# Patient Record
Sex: FEMALE | Race: NATIVE HAWAIIAN OR OTHER PACIFIC ISLANDER | NOT HISPANIC OR LATINO
[De-identification: names, ages, dates, MRNs, and addresses within clinical notes are randomized per-mention and may not be internally consistent; named-entity substitution may affect disease eponyms.]

---

## 2017-10-02 ENCOUNTER — APPOINTMENT (OUTPATIENT)
Dept: UROGYNECOLOGY | Facility: CLINIC | Age: 82
End: 2017-10-02
Payer: MEDICARE

## 2017-10-02 VITALS
HEIGHT: 64 IN | DIASTOLIC BLOOD PRESSURE: 80 MMHG | WEIGHT: 165 LBS | SYSTOLIC BLOOD PRESSURE: 152 MMHG | BODY MASS INDEX: 28.17 KG/M2

## 2017-10-02 DIAGNOSIS — Z82.49 FAMILY HISTORY OF ISCHEMIC HEART DISEASE AND OTHER DISEASES OF THE CIRCULATORY SYSTEM: ICD-10-CM

## 2017-10-02 DIAGNOSIS — R39.15 URGENCY OF URINATION: ICD-10-CM

## 2017-10-02 DIAGNOSIS — Z83.3 FAMILY HISTORY OF DIABETES MELLITUS: ICD-10-CM

## 2017-10-02 DIAGNOSIS — Z82.3 FAMILY HISTORY OF STROKE: ICD-10-CM

## 2017-10-02 DIAGNOSIS — Z78.9 OTHER SPECIFIED HEALTH STATUS: ICD-10-CM

## 2017-10-02 DIAGNOSIS — Z83.79 FAMILY HISTORY OF OTHER DISEASES OF THE DIGESTIVE SYSTEM: ICD-10-CM

## 2017-10-02 DIAGNOSIS — Z63.4 DISAPPEARANCE AND DEATH OF FAMILY MEMBER: ICD-10-CM

## 2017-10-02 DIAGNOSIS — N39.41 URGE INCONTINENCE: ICD-10-CM

## 2017-10-02 PROCEDURE — 99204 OFFICE O/P NEW MOD 45 MIN: CPT | Mod: 25

## 2017-10-02 PROCEDURE — 51701 INSERT BLADDER CATHETER: CPT

## 2017-10-02 SDOH — SOCIAL STABILITY - SOCIAL INSECURITY: DISSAPEARANCE AND DEATH OF FAMILY MEMBER: Z63.4

## 2017-10-03 ENCOUNTER — RESULT REVIEW (OUTPATIENT)
Age: 82
End: 2017-10-03

## 2017-10-03 PROBLEM — Z82.49 FAMILY HISTORY OF HYPERTENSION: Status: ACTIVE | Noted: 2017-10-03

## 2017-10-03 PROBLEM — Z78.9 NON-SMOKER: Status: ACTIVE | Noted: 2017-10-03

## 2017-10-03 PROBLEM — Z63.4 WIDOWED: Status: ACTIVE | Noted: 2017-10-03

## 2017-10-03 PROBLEM — Z83.79 FAMILY HISTORY OF LIVER DISEASE: Status: ACTIVE | Noted: 2017-10-03

## 2017-10-03 PROBLEM — Z82.3 FAMILY HISTORY OF CEREBROVASCULAR ACCIDENT (CVA): Status: ACTIVE | Noted: 2017-10-03

## 2017-10-03 PROBLEM — Z83.3 FAMILY HISTORY OF DIABETES MELLITUS: Status: ACTIVE | Noted: 2017-10-03

## 2017-10-03 LAB
APPEARANCE: CLEAR
BACTERIA: NEGATIVE
BILIRUB UR QL STRIP: NORMAL
BILIRUBIN URINE: NEGATIVE
BLOOD URINE: NEGATIVE
CLARITY UR: CLEAR
COLLECTION METHOD: NORMAL
COLOR: YELLOW
GLUCOSE QUALITATIVE U: NORMAL MG/DL
GLUCOSE UR-MCNC: NORMAL
HCG UR QL: 0.2 EU/DL
HGB UR QL STRIP.AUTO: NORMAL
KETONES UR-MCNC: NORMAL
KETONES URINE: NEGATIVE
LEUKOCYTE ESTERASE UR QL STRIP: NORMAL
LEUKOCYTE ESTERASE URINE: NEGATIVE
MICROSCOPIC-UA: NORMAL
NITRITE UR QL STRIP: NORMAL
NITRITE URINE: NEGATIVE
PH UR STRIP: 6
PH URINE: 6
PROT UR STRIP-MCNC: NORMAL
PROTEIN URINE: NEGATIVE MG/DL
RED BLOOD CELLS URINE: 1 /HPF
SP GR UR STRIP: 1.01
SPECIFIC GRAVITY URINE: 1.02
SQUAMOUS EPITHELIAL CELLS: 1 /HPF
UROBILINOGEN URINE: NORMAL MG/DL
WHITE BLOOD CELLS URINE: 0 /HPF

## 2017-10-04 ENCOUNTER — RESULT REVIEW (OUTPATIENT)
Age: 82
End: 2017-10-04

## 2017-10-04 LAB — BACTERIA UR CULT: NORMAL

## 2017-10-12 ENCOUNTER — APPOINTMENT (OUTPATIENT)
Dept: UROGYNECOLOGY | Facility: CLINIC | Age: 82
End: 2017-10-12

## 2017-10-23 ENCOUNTER — APPOINTMENT (OUTPATIENT)
Dept: UROGYNECOLOGY | Facility: CLINIC | Age: 82
End: 2017-10-23

## 2019-12-06 ENCOUNTER — RX RENEWAL (OUTPATIENT)
Age: 84
End: 2019-12-06

## 2019-12-24 ENCOUNTER — RX RENEWAL (OUTPATIENT)
Age: 84
End: 2019-12-24

## 2019-12-25 ENCOUNTER — RX RENEWAL (OUTPATIENT)
Age: 84
End: 2019-12-25

## 2019-12-26 ENCOUNTER — MEDICATION RENEWAL (OUTPATIENT)
Age: 84
End: 2019-12-26

## 2020-01-18 ENCOUNTER — RX RENEWAL (OUTPATIENT)
Age: 85
End: 2020-01-18

## 2020-01-24 ENCOUNTER — RX RENEWAL (OUTPATIENT)
Age: 85
End: 2020-01-24

## 2020-02-12 ENCOUNTER — RX RENEWAL (OUTPATIENT)
Age: 85
End: 2020-02-12

## 2020-03-23 ENCOUNTER — RX RENEWAL (OUTPATIENT)
Age: 85
End: 2020-03-23

## 2020-06-02 ENCOUNTER — RX RENEWAL (OUTPATIENT)
Age: 85
End: 2020-06-02

## 2020-06-08 ENCOUNTER — RX RENEWAL (OUTPATIENT)
Age: 85
End: 2020-06-08

## 2020-07-06 ENCOUNTER — RX RENEWAL (OUTPATIENT)
Age: 85
End: 2020-07-06

## 2020-07-21 ENCOUNTER — RX RENEWAL (OUTPATIENT)
Age: 85
End: 2020-07-21

## 2020-09-08 ENCOUNTER — RX RENEWAL (OUTPATIENT)
Age: 85
End: 2020-09-08

## 2020-11-25 ENCOUNTER — RX RENEWAL (OUTPATIENT)
Age: 85
End: 2020-11-25

## 2020-12-14 ENCOUNTER — APPOINTMENT (OUTPATIENT)
Dept: INTERNAL MEDICINE | Facility: CLINIC | Age: 85
End: 2020-12-14
Payer: MEDICARE

## 2020-12-14 ENCOUNTER — NON-APPOINTMENT (OUTPATIENT)
Age: 85
End: 2020-12-14

## 2020-12-14 VITALS
TEMPERATURE: 98.1 F | HEIGHT: 64 IN | WEIGHT: 160 LBS | HEART RATE: 67 BPM | BODY MASS INDEX: 27.31 KG/M2 | OXYGEN SATURATION: 96 %

## 2020-12-14 VITALS — SYSTOLIC BLOOD PRESSURE: 140 MMHG | DIASTOLIC BLOOD PRESSURE: 80 MMHG

## 2020-12-14 PROCEDURE — 36415 COLL VENOUS BLD VENIPUNCTURE: CPT

## 2020-12-14 PROCEDURE — 99214 OFFICE O/P EST MOD 30 MIN: CPT | Mod: 25

## 2020-12-14 PROCEDURE — 93000 ELECTROCARDIOGRAM COMPLETE: CPT

## 2020-12-14 RX ORDER — ATENOLOL 50 MG/1
TABLET ORAL
Refills: 0 | Status: DISCONTINUED | COMMUNITY
End: 2020-12-14

## 2020-12-14 RX ORDER — OMEPRAZOLE 40 MG/1
CAPSULE, DELAYED RELEASE ORAL
Refills: 0 | Status: DISCONTINUED | COMMUNITY
End: 2020-12-14

## 2020-12-14 RX ORDER — ATORVASTATIN CALCIUM 80 MG/1
TABLET, FILM COATED ORAL
Refills: 0 | Status: DISCONTINUED | COMMUNITY
End: 2020-12-14

## 2020-12-14 RX ORDER — LOSARTAN POTASSIUM AND HYDROCHLOROTHIAZIDE 12.5; 1 MG/1; MG/1
TABLET ORAL
Refills: 0 | Status: DISCONTINUED | COMMUNITY
End: 2020-12-14

## 2020-12-14 NOTE — ASSESSMENT
[FreeTextEntry1] : Short Px on meds. Good bp contro and 115/80 at home, 140/80 here. Cking lipids. Chronic cough likely fro posrnasal drip and gerd. Had flu shot. EKG LBBB poor R progfression. Need old records from [facility].

## 2020-12-14 NOTE — REVIEW OF SYSTEMS
[Negative] : Heme/Lymph [FreeTextEntry6] : chronic cough 2nsd to drip or gerd [FreeTextEntry8] : stress incontinence [de-identified] : chronic mild neuropathy

## 2020-12-14 NOTE — PHYSICAL EXAM
[No Acute Distress] : no acute distress [Well Nourished] : well nourished [Well Developed] : well developed [Well-Appearing] : well-appearing [Normal Sclera/Conjunctiva] : normal sclera/conjunctiva [PERRL] : pupils equal round and reactive to light [EOMI] : extraocular movements intact [Normal Outer Ear/Nose] : the outer ears and nose were normal in appearance [Normal Oropharynx] : the oropharynx was normal [No JVD] : no jugular venous distention [No Lymphadenopathy] : no lymphadenopathy [Supple] : supple [Thyroid Normal, No Nodules] : the thyroid was normal and there were no nodules present [No Respiratory Distress] : no respiratory distress  [No Accessory Muscle Use] : no accessory muscle use [Clear to Auscultation] : lungs were clear to auscultation bilaterally [Normal Rate] : normal rate  [Regular Rhythm] : with a regular rhythm [Normal S1, S2] : normal S1 and S2 [No Murmur] : no murmur heard [No Carotid Bruits] : no carotid bruits [No Abdominal Bruit] : a ~M bruit was not heard ~T in the abdomen [No Varicosities] : no varicosities [Pedal Pulses Present] : the pedal pulses are present [No Edema] : there was no peripheral edema [No Palpable Aorta] : no palpable aorta [No Extremity Clubbing/Cyanosis] : no extremity clubbing/cyanosis [Soft] : abdomen soft [Non Tender] : non-tender [Non-distended] : non-distended [No Masses] : no abdominal mass palpated [No HSM] : no HSM [Normal Bowel Sounds] : normal bowel sounds [Normal Posterior Cervical Nodes] : no posterior cervical lymphadenopathy [Normal Anterior Cervical Nodes] : no anterior cervical lymphadenopathy [No CVA Tenderness] : no CVA  tenderness [No Spinal Tenderness] : no spinal tenderness [No Joint Swelling] : no joint swelling [Grossly Normal Strength/Tone] : grossly normal strength/tone [No Rash] : no rash [Coordination Grossly Intact] : coordination grossly intact [No Focal Deficits] : no focal deficits [Normal Gait] : normal gait [Normal Affect] : the affect was normal [Normal Insight/Judgement] : insight and judgment were intact [Normal] : affect was normal and insight and judgment were intact [de-identified] : overwt -looks younger than stated qge [de-identified] : no masses [FreeTextEntry1] : deferred

## 2020-12-14 NOTE — HISTORY OF PRESENT ILLNESS
[de-identified] : 88 F tx'ed hyperlipidemia, Gerd,hypertension and insomnia on zolpidem i/2 10mg 3x/wk. On losartan,hctz,atorvastatin. Lives alone and allm ADFL on her own. ROS all ne g ex mil neuropathy,stress incontinence.

## 2020-12-15 LAB
ALBUMIN SERPL ELPH-MCNC: 4.3 G/DL
ALP BLD-CCNC: 119 U/L
ALT SERPL-CCNC: 22 U/L
ANION GAP SERPL CALC-SCNC: 12 MMOL/L
AST SERPL-CCNC: 18 U/L
BASOPHILS # BLD AUTO: 0.04 K/UL
BASOPHILS NFR BLD AUTO: 0.6 %
BILIRUB SERPL-MCNC: 0.4 MG/DL
BUN SERPL-MCNC: 32 MG/DL
CALCIUM SERPL-MCNC: 9.8 MG/DL
CHLORIDE SERPL-SCNC: 105 MMOL/L
CHOLEST SERPL-MCNC: 178 MG/DL
CK SERPL-CCNC: 164 U/L
CO2 SERPL-SCNC: 26 MMOL/L
CREAT SERPL-MCNC: 1.5 MG/DL
EOSINOPHIL # BLD AUTO: 0.1 K/UL
EOSINOPHIL NFR BLD AUTO: 1.6 %
GLUCOSE SERPL-MCNC: 141 MG/DL
HCT VFR BLD CALC: 37.1 %
HDLC SERPL-MCNC: 37 MG/DL
HGB BLD-MCNC: 11.9 G/DL
IMM GRANULOCYTES NFR BLD AUTO: 0.2 %
LDLC SERPL CALC-MCNC: 109 MG/DL
LYMPHOCYTES # BLD AUTO: 0.97 K/UL
LYMPHOCYTES NFR BLD AUTO: 15.1 %
MAN DIFF?: NORMAL
MCHC RBC-ENTMCNC: 29.4 PG
MCHC RBC-ENTMCNC: 32.1 GM/DL
MCV RBC AUTO: 91.6 FL
MONOCYTES # BLD AUTO: 0.45 K/UL
MONOCYTES NFR BLD AUTO: 7 %
NEUTROPHILS # BLD AUTO: 4.84 K/UL
NEUTROPHILS NFR BLD AUTO: 75.5 %
NONHDLC SERPL-MCNC: 141 MG/DL
PLATELET # BLD AUTO: 197 K/UL
POTASSIUM SERPL-SCNC: 4.2 MMOL/L
PROT SERPL-MCNC: 6.6 G/DL
RBC # BLD: 4.05 M/UL
RBC # FLD: 13.7 %
SODIUM SERPL-SCNC: 142 MMOL/L
TRIGL SERPL-MCNC: 158 MG/DL
WBC # FLD AUTO: 6.41 K/UL

## 2020-12-28 ENCOUNTER — RX RENEWAL (OUTPATIENT)
Age: 85
End: 2020-12-28

## 2021-01-21 DIAGNOSIS — Z87.898 PERSONAL HISTORY OF OTHER SPECIFIED CONDITIONS: ICD-10-CM

## 2021-01-21 DIAGNOSIS — Z14.8 GENETIC CARRIER OF OTHER DISEASE: ICD-10-CM

## 2021-01-21 DIAGNOSIS — Z82.79 FAMILY HISTORY OF OTHER CONGENITAL MALFORMATIONS, DEFORMATIONS AND CHROMOSOMAL ABNORMALITIES: ICD-10-CM

## 2021-01-21 DIAGNOSIS — Z87.891 PERSONAL HISTORY OF NICOTINE DEPENDENCE: ICD-10-CM

## 2021-01-21 DIAGNOSIS — Z86.39 PERSONAL HISTORY OF OTHER ENDOCRINE, NUTRITIONAL AND METABOLIC DISEASE: ICD-10-CM

## 2021-01-26 ENCOUNTER — TRANSCRIPTION ENCOUNTER (OUTPATIENT)
Age: 86
End: 2021-01-26

## 2021-03-31 ENCOUNTER — RX RENEWAL (OUTPATIENT)
Age: 86
End: 2021-03-31

## 2021-06-01 ENCOUNTER — RX RENEWAL (OUTPATIENT)
Age: 86
End: 2021-06-01

## 2021-09-16 ENCOUNTER — RX RENEWAL (OUTPATIENT)
Age: 86
End: 2021-09-16

## 2021-10-14 ENCOUNTER — RX RENEWAL (OUTPATIENT)
Age: 86
End: 2021-10-14

## 2022-01-24 ENCOUNTER — RX RENEWAL (OUTPATIENT)
Age: 87
End: 2022-01-24

## 2022-01-26 ENCOUNTER — RX RENEWAL (OUTPATIENT)
Age: 87
End: 2022-01-26

## 2022-03-16 ENCOUNTER — APPOINTMENT (OUTPATIENT)
Dept: INTERNAL MEDICINE | Facility: CLINIC | Age: 87
End: 2022-03-16
Payer: MEDICARE

## 2022-03-16 ENCOUNTER — NON-APPOINTMENT (OUTPATIENT)
Age: 87
End: 2022-03-16

## 2022-03-16 VITALS
SYSTOLIC BLOOD PRESSURE: 140 MMHG | OXYGEN SATURATION: 97 % | TEMPERATURE: 97.9 F | BODY MASS INDEX: 26.98 KG/M2 | HEART RATE: 48 BPM | DIASTOLIC BLOOD PRESSURE: 82 MMHG | HEIGHT: 64 IN | WEIGHT: 158 LBS

## 2022-03-16 DIAGNOSIS — Z00.00 ENCOUNTER FOR GENERAL ADULT MEDICAL EXAMINATION W/OUT ABNORMAL FINDINGS: ICD-10-CM

## 2022-03-16 PROCEDURE — 36415 COLL VENOUS BLD VENIPUNCTURE: CPT

## 2022-03-16 PROCEDURE — 93000 ELECTROCARDIOGRAM COMPLETE: CPT | Mod: 59

## 2022-03-16 PROCEDURE — G0439: CPT

## 2022-03-16 NOTE — PHYSICAL EXAM
[Normal] : no joint swelling, grossly normal strength/tone [de-identified] : elderly F looking younger than stated age [de-identified] : part of aid stuck L ear canal [de-identified] : no significan lesions [FreeTextEntry1] : refused -given guaiacs [de-identified] : neg [de-identified] : neg [de-identified] : neg [de-identified] : non focal but gait small slow steps romberg neg

## 2022-03-16 NOTE — ASSESSMENT
[FreeTextEntry1] : all screenning tests done - given myrberteriq for stress incontinence, renewed xanax for stress w adult children at her home. BP well controlled, Given 3 guaiac cards.

## 2022-03-16 NOTE — PHYSICAL EXAM
[Normal] : no joint swelling, grossly normal strength/tone [de-identified] : elderly F looking younger than stated age [de-identified] : part of aid stuck L ear canal [de-identified] : no significan lesions [FreeTextEntry1] : refused -given guaiacs [de-identified] : neg [de-identified] : neg [de-identified] : neg [de-identified] : non focal but gait small slow steps romberg neg

## 2022-03-16 NOTE — REVIEW OF SYSTEMS
[Unsteady Walking] : ataxia [Negative] : Heme/Lymph [FreeTextEntry4] : L ear ? of part of aid stuck [FreeTextEntry8] : stress incontinence

## 2022-03-17 LAB
25(OH)D3 SERPL-MCNC: 61 NG/ML
ALBUMIN SERPL ELPH-MCNC: 4.5 G/DL
ALP BLD-CCNC: 102 U/L
ALT SERPL-CCNC: 17 U/L
ANION GAP SERPL CALC-SCNC: 13 MMOL/L
APPEARANCE: ABNORMAL
AST SERPL-CCNC: 19 U/L
BACTERIA: NEGATIVE
BASOPHILS # BLD AUTO: 0.04 K/UL
BASOPHILS NFR BLD AUTO: 0.6 %
BILIRUB SERPL-MCNC: 0.3 MG/DL
BILIRUBIN URINE: NEGATIVE
BLOOD URINE: NEGATIVE
BUN SERPL-MCNC: 37 MG/DL
CALCIUM SERPL-MCNC: 9.5 MG/DL
CHLORIDE SERPL-SCNC: 105 MMOL/L
CHOLEST SERPL-MCNC: 205 MG/DL
CK SERPL-CCNC: 188 U/L
CO2 SERPL-SCNC: 25 MMOL/L
COLOR: YELLOW
CREAT SERPL-MCNC: 1.58 MG/DL
CRP SERPL HS-MCNC: 3.89 MG/L
EGFR: 31 ML/MIN/1.73M2
EOSINOPHIL # BLD AUTO: 0.08 K/UL
EOSINOPHIL NFR BLD AUTO: 1.1 %
GLUCOSE QUALITATIVE U: NEGATIVE
GLUCOSE SERPL-MCNC: 135 MG/DL
HCT VFR BLD CALC: 35.1 %
HDLC SERPL-MCNC: 39 MG/DL
HGB BLD-MCNC: 11 G/DL
HYALINE CASTS: 1 /LPF
IMM GRANULOCYTES NFR BLD AUTO: 0.3 %
KETONES URINE: NEGATIVE
LDLC SERPL CALC-MCNC: 130 MG/DL
LEUKOCYTE ESTERASE URINE: ABNORMAL
LYMPHOCYTES # BLD AUTO: 1.33 K/UL
LYMPHOCYTES NFR BLD AUTO: 19 %
MAN DIFF?: NORMAL
MCHC RBC-ENTMCNC: 29.5 PG
MCHC RBC-ENTMCNC: 31.3 GM/DL
MCV RBC AUTO: 94.1 FL
MICROSCOPIC-UA: NORMAL
MONOCYTES # BLD AUTO: 0.62 K/UL
MONOCYTES NFR BLD AUTO: 8.8 %
NEUTROPHILS # BLD AUTO: 4.92 K/UL
NEUTROPHILS NFR BLD AUTO: 70.2 %
NITRITE URINE: NEGATIVE
NONHDLC SERPL-MCNC: 166 MG/DL
PH URINE: 6
PLATELET # BLD AUTO: 227 K/UL
POTASSIUM SERPL-SCNC: 4.8 MMOL/L
PROT SERPL-MCNC: 7.1 G/DL
PROTEIN URINE: NORMAL
RBC # BLD: 3.73 M/UL
RBC # FLD: 13.9 %
RED BLOOD CELLS URINE: 8 /HPF
SODIUM SERPL-SCNC: 142 MMOL/L
SPECIFIC GRAVITY URINE: 1.02
SQUAMOUS EPITHELIAL CELLS: 9 /HPF
TRIGL SERPL-MCNC: 181 MG/DL
TSH SERPL-ACNC: 1.38 UIU/ML
URINE COMMENTS: NORMAL
UROBILINOGEN URINE: NORMAL
WBC # FLD AUTO: 7.01 K/UL
WHITE BLOOD CELLS URINE: 27 /HPF

## 2022-03-18 ENCOUNTER — APPOINTMENT (OUTPATIENT)
Dept: OTOLARYNGOLOGY | Facility: CLINIC | Age: 87
End: 2022-03-18
Payer: MEDICARE

## 2022-03-18 VITALS
HEIGHT: 64 IN | BODY MASS INDEX: 26.98 KG/M2 | SYSTOLIC BLOOD PRESSURE: 151 MMHG | TEMPERATURE: 97 F | DIASTOLIC BLOOD PRESSURE: 73 MMHG | WEIGHT: 158 LBS | HEART RATE: 55 BPM

## 2022-03-18 DIAGNOSIS — T16.2XXA FOREIGN BODY IN LEFT EAR, INITIAL ENCOUNTER: ICD-10-CM

## 2022-03-18 PROCEDURE — 99203 OFFICE O/P NEW LOW 30 MIN: CPT | Mod: 25

## 2022-03-18 PROCEDURE — 69200 CLEAR OUTER EAR CANAL: CPT

## 2022-03-18 PROCEDURE — 92567 TYMPANOMETRY: CPT

## 2022-03-18 PROCEDURE — 92557 COMPREHENSIVE HEARING TEST: CPT

## 2022-03-18 NOTE — HISTORY OF PRESENT ILLNESS
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  She notes that the tip of the HA is stuck in her left ear.  No pain, no d/c, no tinnitus or vertigo. Feels pressure in the left ear.

## 2022-03-18 NOTE — PROCEDURE
[FreeTextEntry3] : Procedure - foreign body Removal. \par After informed verbal consent is obtained, foreign body is removed from the left ear canal with a forceps and curette/ suction.  Normal appearing canal following removal.\par

## 2022-03-18 NOTE — CONSULT LETTER
[Dear  ___] : Dear  [unfilled], [Consult Letter:] : I had the pleasure of evaluating your patient, [unfilled]. [Please see my note below.] : Please see my note below. [Consult Closing:] : Thank you very much for allowing me to participate in the care of this patient.  If you have any questions, please do not hesitate to contact me. [Sincerely,] : Sincerely, [FreeTextEntry3] : Jacey Collins MD\par Otolaryngology and Cranial Base Surgery\par Attending Physician - Department of Otolaryngology and Head & Neck Surgery\par [facility]\par  - [employer] [facility] [employer]\par Office: [phone]\par Fax: [phone]\par

## 2022-03-18 NOTE — DATA REVIEWED
[de-identified] : Type A tymps in both ears. Left ear is a mild to severe SNHL. Right ear is a severe to profound SNHL.

## 2022-03-18 NOTE — PHYSICAL EXAM
[Midline] : trachea located in midline position [Normal] : no rashes [de-identified] : Left with Foreign body.  Right clear.

## 2022-03-18 NOTE — ASSESSMENT
[FreeTextEntry1] : Right ear foreign body.\par - Foreign body removed in office.\par \par SNHL:\par - Audiogram today with right asymmetric SNHL\par - pt notes had MRI and workup with Dr. Villasenor 5 years ago and 2 steroid IT injections\par - HAE for new HA and clearance given\par \par HTN\par - F/U with PMD\par \par \par \par I personally saw and examined Ms. SONJA WOOD in detail this visit today. I personally reviewed the HPI, PMH, FH, SH, ROS and medications/allergies. I have spoken to TASHA Moses regarding the history and have personally determined the assessment and plan of care, and documented this myself. I was present and participated in all key portions of the encounter and all procedures noted above. I have made changes in the body of the note where appropriate.\par \par Attesting Faculty: See Attending Signature Below

## 2022-04-18 ENCOUNTER — RX RENEWAL (OUTPATIENT)
Age: 87
End: 2022-04-18

## 2022-04-20 ENCOUNTER — RX RENEWAL (OUTPATIENT)
Age: 87
End: 2022-04-20

## 2022-05-16 ENCOUNTER — RX RENEWAL (OUTPATIENT)
Age: 87
End: 2022-05-16

## 2022-06-10 ENCOUNTER — APPOINTMENT (OUTPATIENT)
Dept: OTOLARYNGOLOGY | Facility: CLINIC | Age: 87
End: 2022-06-10
Payer: MEDICARE

## 2022-06-10 VITALS
TEMPERATURE: 98 F | WEIGHT: 158 LBS | HEIGHT: 64 IN | BODY MASS INDEX: 26.98 KG/M2 | HEART RATE: 61 BPM | SYSTOLIC BLOOD PRESSURE: 156 MMHG | DIASTOLIC BLOOD PRESSURE: 78 MMHG

## 2022-06-10 DIAGNOSIS — J34.89 OTHER SPECIFIED DISORDERS OF NOSE AND NASAL SINUSES: ICD-10-CM

## 2022-06-10 DIAGNOSIS — J34.3 HYPERTROPHY OF NASAL TURBINATES: ICD-10-CM

## 2022-06-10 DIAGNOSIS — R19.8 OTHER SPECIFIED SYMPTOMS AND SIGNS INVOLVING THE DIGESTIVE SYSTEM AND ABDOMEN: ICD-10-CM

## 2022-06-10 DIAGNOSIS — R09.82 POSTNASAL DRIP: ICD-10-CM

## 2022-06-10 PROCEDURE — 31231 NASAL ENDOSCOPY DX: CPT

## 2022-06-10 PROCEDURE — 99214 OFFICE O/P EST MOD 30 MIN: CPT | Mod: 25

## 2022-06-10 NOTE — ASSESSMENT
[FreeTextEntry1] : Ms. WOOD is a 89 year female with long history of runny nose, PND and sinus pressure despite use of nasal sprays. On exam found to have nasal hypertrophy.\par -Rx Budesonide nasal atomizer will trial for 1 month, son has a motorized atomizer which he will purchase for her as well. \par - f/up with me as needed, if she gets good benefit with this and needs more refills, will refill for 6 months then bring her back for a recheck\par \par

## 2022-06-10 NOTE — DATA REVIEWED
[de-identified] : 3/18/22 - type A tymps b/l\par right severe to profound SNHL\par left mild to severe SNHL

## 2022-06-10 NOTE — PROCEDURE
[FreeTextEntry6] : reason for exam: anterior rhinoscopy insufficient for symptom evaluation \par \par Fiberoptic nasal endoscopy was performed.  R/b/a of procedure was explained to the patient and they agreed to proceed with procedure.  Nasal cavities were treated with afrin and lidocaine prior to nasal endoscopy to make the patient more comfortable. B/l inferior turbinate hypertrophy, severe with edematous mucosa.  Remainder of exam normal, including b/l middle turbinates, superior turbinates, inferior, middle and superior meati and sphenoethmoidal recess.  No nasal polyps.  Septum deviated left\par \par scope #: [id number]\par \par \par \par \par

## 2022-06-10 NOTE — PHYSICAL EXAM
[Nasal Endoscopy Performed] : nasal endoscopy was performed, see procedure section for findings [Normal] : mucosa is normal [Midline] : trachea located in midline position [Laryngoscopy Performed] : laryngoscopy was performed, see procedure section for findings [de-identified] : hypertrophy

## 2022-06-10 NOTE — END OF VISIT
[FreeTextEntry3] : I personally saw and examined SONJA WOOD in detail.  I spoke to KARINE Virgen regarding the assessment and plan of care. I performed the procedures and relevant physical exam.  I have reviewed the above assessment and plan of care and I agree.  I have made changes to the body of the note wherever necessary and appropriate.\par
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
## 2022-06-16 ENCOUNTER — NON-APPOINTMENT (OUTPATIENT)
Age: 87
End: 2022-06-16

## 2022-06-16 ENCOUNTER — RX RENEWAL (OUTPATIENT)
Age: 87
End: 2022-06-16

## 2022-06-22 ENCOUNTER — RX CHANGE (OUTPATIENT)
Age: 87
End: 2022-06-22

## 2022-06-22 RX ORDER — TOLTERODINE TARTRATE 2 MG/1
2 CAPSULE, EXTENDED RELEASE ORAL TWICE DAILY
Qty: 60 | Refills: 11 | Status: DISCONTINUED | COMMUNITY
Start: 2022-06-22 | End: 2022-06-22

## 2022-07-13 ENCOUNTER — APPOINTMENT (OUTPATIENT)
Dept: INTERNAL MEDICINE | Facility: CLINIC | Age: 87
End: 2022-07-13

## 2022-07-13 PROCEDURE — 99441: CPT | Mod: 95

## 2022-07-13 RX ORDER — MIRABEGRON 25 MG/1
25 TABLET, FILM COATED, EXTENDED RELEASE ORAL DAILY
Qty: 30 | Refills: 3 | Status: DISCONTINUED | COMMUNITY
Start: 2022-03-16 | End: 2022-07-13

## 2022-07-16 ENCOUNTER — TRANSCRIPTION ENCOUNTER (OUTPATIENT)
Age: 87
End: 2022-07-16

## 2022-07-19 ENCOUNTER — APPOINTMENT (OUTPATIENT)
Dept: INTERNAL MEDICINE | Facility: CLINIC | Age: 87
End: 2022-07-19

## 2022-07-19 ENCOUNTER — TRANSCRIPTION ENCOUNTER (OUTPATIENT)
Age: 87
End: 2022-07-19

## 2022-07-19 ENCOUNTER — INPATIENT (INPATIENT)
Facility: HOSPITAL | Age: 87
LOS: 8 days | Discharge: HOME HEALTH | DRG: 374 | End: 2022-07-28
Attending: INTERNAL MEDICINE | Admitting: STUDENT IN AN ORGANIZED HEALTH CARE EDUCATION/TRAINING PROGRAM
Payer: MEDICARE

## 2022-07-19 VITALS
SYSTOLIC BLOOD PRESSURE: 125 MMHG | RESPIRATION RATE: 16 BRPM | WEIGHT: 156.97 LBS | DIASTOLIC BLOOD PRESSURE: 70 MMHG | OXYGEN SATURATION: 98 % | TEMPERATURE: 98 F | HEIGHT: 65 IN | HEART RATE: 63 BPM

## 2022-07-19 VITALS
SYSTOLIC BLOOD PRESSURE: 110 MMHG | HEIGHT: 64 IN | OXYGEN SATURATION: 97 % | WEIGHT: 158 LBS | BODY MASS INDEX: 26.98 KG/M2 | DIASTOLIC BLOOD PRESSURE: 60 MMHG | HEART RATE: 62 BPM

## 2022-07-19 DIAGNOSIS — K83.1 OBSTRUCTION OF BILE DUCT: ICD-10-CM

## 2022-07-19 DIAGNOSIS — R10.13 EPIGASTRIC PAIN: ICD-10-CM

## 2022-07-19 LAB
ALBUMIN SERPL ELPH-MCNC: 4.1 G/DL — SIGNIFICANT CHANGE UP (ref 3.3–5)
ALP SERPL-CCNC: 696 U/L — HIGH (ref 40–120)
ALT FLD-CCNC: 242 U/L — HIGH (ref 10–45)
ANION GAP SERPL CALC-SCNC: 14 MMOL/L — SIGNIFICANT CHANGE UP (ref 5–17)
APPEARANCE UR: ABNORMAL
APTT BLD: 32 SEC — SIGNIFICANT CHANGE UP (ref 27.5–35.5)
AST SERPL-CCNC: 215 U/L — HIGH (ref 10–40)
BACTERIA # UR AUTO: NEGATIVE — SIGNIFICANT CHANGE UP
BASE EXCESS BLDV CALC-SCNC: -0.6 MMOL/L — SIGNIFICANT CHANGE UP (ref -2–2)
BASOPHILS # BLD AUTO: 0.04 K/UL — SIGNIFICANT CHANGE UP (ref 0–0.2)
BASOPHILS NFR BLD AUTO: 0.4 % — SIGNIFICANT CHANGE UP (ref 0–2)
BILIRUB SERPL-MCNC: 3.9 MG/DL — HIGH (ref 0.2–1.2)
BILIRUB UR-MCNC: ABNORMAL
BLD GP AB SCN SERPL QL: NEGATIVE — SIGNIFICANT CHANGE UP
BUN SERPL-MCNC: 28 MG/DL — HIGH (ref 7–23)
CA-I SERPL-SCNC: 1.27 MMOL/L — SIGNIFICANT CHANGE UP (ref 1.15–1.33)
CALCIUM SERPL-MCNC: 9.7 MG/DL — SIGNIFICANT CHANGE UP (ref 8.4–10.5)
CHLORIDE BLDV-SCNC: 101 MMOL/L — SIGNIFICANT CHANGE UP (ref 96–108)
CHLORIDE SERPL-SCNC: 98 MMOL/L — SIGNIFICANT CHANGE UP (ref 96–108)
CO2 BLDV-SCNC: 26 MMOL/L — SIGNIFICANT CHANGE UP (ref 22–26)
CO2 SERPL-SCNC: 23 MMOL/L — SIGNIFICANT CHANGE UP (ref 22–31)
COLOR SPEC: ABNORMAL
CREAT SERPL-MCNC: 2.06 MG/DL — HIGH (ref 0.5–1.3)
DIFF PNL FLD: ABNORMAL
EGFR: 23 ML/MIN/1.73M2 — LOW
EOSINOPHIL # BLD AUTO: 0.02 K/UL — SIGNIFICANT CHANGE UP (ref 0–0.5)
EOSINOPHIL NFR BLD AUTO: 0.2 % — SIGNIFICANT CHANGE UP (ref 0–6)
EPI CELLS # UR: 9 /HPF — HIGH
GAS PNL BLDV: 135 MMOL/L — LOW (ref 136–145)
GAS PNL BLDV: SIGNIFICANT CHANGE UP
GLUCOSE BLDV-MCNC: 143 MG/DL — HIGH (ref 70–99)
GLUCOSE SERPL-MCNC: 145 MG/DL — HIGH (ref 70–99)
GLUCOSE UR QL: ABNORMAL
HCO3 BLDV-SCNC: 25 MMOL/L — SIGNIFICANT CHANGE UP (ref 22–29)
HCT VFR BLD CALC: 26.2 % — LOW (ref 34.5–45)
HCT VFR BLDA CALC: 25 % — LOW (ref 34.5–46.5)
HGB BLD CALC-MCNC: 8.2 G/DL — LOW (ref 11.7–16.1)
HGB BLD-MCNC: 7.9 G/DL — LOW (ref 11.5–15.5)
HYALINE CASTS # UR AUTO: 11 /LPF — HIGH (ref 0–2)
IMM GRANULOCYTES NFR BLD AUTO: 0.9 % — SIGNIFICANT CHANGE UP (ref 0–1.5)
INR BLD: 0.99 RATIO — SIGNIFICANT CHANGE UP (ref 0.88–1.16)
KETONES UR-MCNC: NEGATIVE — SIGNIFICANT CHANGE UP
LACTATE BLDV-MCNC: 1.1 MMOL/L — SIGNIFICANT CHANGE UP (ref 0.7–2)
LEUKOCYTE ESTERASE UR-ACNC: ABNORMAL
LIDOCAIN IGE QN: 26 U/L — SIGNIFICANT CHANGE UP (ref 7–60)
LYMPHOCYTES # BLD AUTO: 0.77 K/UL — LOW (ref 1–3.3)
LYMPHOCYTES # BLD AUTO: 8.5 % — LOW (ref 13–44)
MCHC RBC-ENTMCNC: 29 PG — SIGNIFICANT CHANGE UP (ref 27–34)
MCHC RBC-ENTMCNC: 30.2 GM/DL — LOW (ref 32–36)
MCV RBC AUTO: 96.3 FL — SIGNIFICANT CHANGE UP (ref 80–100)
MONOCYTES # BLD AUTO: 0.79 K/UL — SIGNIFICANT CHANGE UP (ref 0–0.9)
MONOCYTES NFR BLD AUTO: 8.7 % — SIGNIFICANT CHANGE UP (ref 2–14)
NEUTROPHILS # BLD AUTO: 7.4 K/UL — SIGNIFICANT CHANGE UP (ref 1.8–7.4)
NEUTROPHILS NFR BLD AUTO: 81.3 % — HIGH (ref 43–77)
NITRITE UR-MCNC: NEGATIVE — SIGNIFICANT CHANGE UP
NRBC # BLD: 0 /100 WBCS — SIGNIFICANT CHANGE UP (ref 0–0)
OB PNL STL: NEGATIVE — SIGNIFICANT CHANGE UP
PCO2 BLDV: 44 MMHG — HIGH (ref 39–42)
PH BLDV: 7.36 — SIGNIFICANT CHANGE UP (ref 7.32–7.43)
PH UR: 6 — SIGNIFICANT CHANGE UP (ref 5–8)
PLATELET # BLD AUTO: 317 K/UL — SIGNIFICANT CHANGE UP (ref 150–400)
PO2 BLDV: 22 MMHG — LOW (ref 25–45)
POTASSIUM BLDV-SCNC: 3.4 MMOL/L — LOW (ref 3.5–5.1)
POTASSIUM SERPL-MCNC: 3.4 MMOL/L — LOW (ref 3.5–5.3)
POTASSIUM SERPL-SCNC: 3.4 MMOL/L — LOW (ref 3.5–5.3)
PROT SERPL-MCNC: 7.2 G/DL — SIGNIFICANT CHANGE UP (ref 6–8.3)
PROT UR-MCNC: ABNORMAL
PROTHROM AB SERPL-ACNC: 11.4 SEC — SIGNIFICANT CHANGE UP (ref 10.5–13.4)
RBC # BLD: 2.72 M/UL — LOW (ref 3.8–5.2)
RBC # FLD: 15 % — HIGH (ref 10.3–14.5)
RBC CASTS # UR COMP ASSIST: 6 /HPF — HIGH (ref 0–4)
RH IG SCN BLD-IMP: POSITIVE — SIGNIFICANT CHANGE UP
RH IG SCN BLD-IMP: POSITIVE — SIGNIFICANT CHANGE UP
SAO2 % BLDV: 24.9 % — LOW (ref 67–88)
SARS-COV-2 RNA SPEC QL NAA+PROBE: SIGNIFICANT CHANGE UP
SODIUM SERPL-SCNC: 135 MMOL/L — SIGNIFICANT CHANGE UP (ref 135–145)
SP GR SPEC: 1.03 — HIGH (ref 1.01–1.02)
UROBILINOGEN FLD QL: NEGATIVE — SIGNIFICANT CHANGE UP
WBC # BLD: 9.1 K/UL — SIGNIFICANT CHANGE UP (ref 3.8–10.5)
WBC # FLD AUTO: 9.1 K/UL — SIGNIFICANT CHANGE UP (ref 3.8–10.5)
WBC UR QL: 218 /HPF — HIGH (ref 0–5)

## 2022-07-19 PROCEDURE — 82274 ASSAY TEST FOR BLOOD FECAL: CPT | Mod: QW

## 2022-07-19 PROCEDURE — 71045 X-RAY EXAM CHEST 1 VIEW: CPT | Mod: 26

## 2022-07-19 PROCEDURE — 99284 EMERGENCY DEPT VISIT MOD MDM: CPT | Mod: FS

## 2022-07-19 PROCEDURE — 36415 COLL VENOUS BLD VENIPUNCTURE: CPT

## 2022-07-19 PROCEDURE — 99223 1ST HOSP IP/OBS HIGH 75: CPT | Mod: GC

## 2022-07-19 PROCEDURE — 81003 URINALYSIS AUTO W/O SCOPE: CPT | Mod: QW

## 2022-07-19 PROCEDURE — 99213 OFFICE O/P EST LOW 20 MIN: CPT | Mod: 25

## 2022-07-19 PROCEDURE — 74177 CT ABD & PELVIS W/CONTRAST: CPT | Mod: 26,MA

## 2022-07-19 RX ORDER — SODIUM CHLORIDE 9 MG/ML
1000 INJECTION INTRAMUSCULAR; INTRAVENOUS; SUBCUTANEOUS
Refills: 0 | Status: DISCONTINUED | OUTPATIENT
Start: 2022-07-19 | End: 2022-07-20

## 2022-07-19 RX ORDER — PANTOPRAZOLE SODIUM 20 MG/1
40 TABLET, DELAYED RELEASE ORAL ONCE
Refills: 0 | Status: COMPLETED | OUTPATIENT
Start: 2022-07-19 | End: 2022-07-19

## 2022-07-19 RX ADMIN — SODIUM CHLORIDE 75 MILLILITER(S): 9 INJECTION INTRAMUSCULAR; INTRAVENOUS; SUBCUTANEOUS at 19:01

## 2022-07-19 RX ADMIN — PANTOPRAZOLE SODIUM 40 MILLIGRAM(S): 20 TABLET, DELAYED RELEASE ORAL at 19:00

## 2022-07-19 NOTE — ED ADULT NURSE REASSESSMENT NOTE - NS ED NURSE REASSESS COMMENT FT1
Received report from Krystal DEE. Pt observed resting comfortably in stretcher. Pt pending admission bed.

## 2022-07-19 NOTE — ED PROVIDER NOTE - ATTENDING APP SHARED VISIT CONTRIBUTION OF CARE
RGUJRAL 88yo f hx listed presents with diffuse itching, light colored stool and some RUQ abd pain. + NV. Pt denies any pain at this time. Pt saw her PCP who sent her in for eval. No f/c, recent travel, change in meds.   On exam, Patient is awake,alert,oriented x 3. Patient is well appearing and in no acute distress. Sclera icteric Patient's chest is clear to ausculation, +s1s2. Abdomen is soft nd/nt +BS. Extremity with no swelling or calf tenderness.   Check labs, CT/US, cultures to eval for symptoms. IVF and monitor. RGUJRAL 89yo f hx listed presents with diffuse itching, light colored stool and some RUQ abd pain. + NV. Pt denies any pain at this time. Pt saw her PCP who sent her in for eval. No f/c, recent travel, change in meds.   On exam, Patient is awake,alert,oriented x 3. Patient is well appearing and in no acute distress. Sclera icteric Patient's chest is clear to ausculation, +s1s2. Abdomen is soft nd/nt +BS. Extremity with no swelling or calf tenderness.   Pt was qdoc, had labs and CT done. Labs concerning for transaminitis and hyperbili. Pt is non tender abd and denies any pain. Rectal temp afebrile. Will consult GI and admit.

## 2022-07-19 NOTE — ED PROVIDER NOTE - RAPID ASSESSMENT
89 y/o F presents to the ED c/o abdominal pain w/ jaundice and itchy skin. Pt reports 1 episode of vomiting today. +yellow urine. Denies any other acute complaints.     Amairani AGUILAR (Scribe) have documented this rapid assessment note under the dictation of Magaly Phipps () which has been reviewed and affirmed to be accurate. Patient was seen as a QDOC patient. The patient will be seen and further worked up in the main emergency department and their care will be completed by the main emergency department team along with a thorough physical exam. Receiving team will follow up on labs, analgesia, any clinical imaging, reassess and disposition as clinically indicated, all decisions regarding the progression of care will be made at their discretion. 89 y/o F presents to the ED c/o abdominal pain w/ jaundice and itchy skin. Pt reports 1 episode of vomiting today. +yellow urine. Denies any other acute complaints.     IAmairani (Scribe) have documented this rapid assessment note under the dictation of Magaly Phipps () which has been reviewed and affirmed to be accurate. Patient was seen as a QDOC patient. The patient will be seen and further worked up in the main emergency department and their care will be completed by the main emergency department team along with a thorough physical exam. Receiving team will follow up on labs, analgesia, any clinical imaging, reassess and disposition as clinically indicated, all decisions regarding the progression of care will be made at their discretion.    I attest to the ACP/Scribe documentation above. -DT

## 2022-07-19 NOTE — ED PROVIDER NOTE - PROGRESS NOTE DETAILS
Spoke to Dr. Washington, discussed pt, ok with admission to inpatient hospitalist, confirmed pt has no hx with liver/biliary pathology.  - MARYELLEN FaganC Imaging/labs reviewed with pt - liver pathology with bile duct stricture, questions were answered to her satisfaction. Spoke to hospitalist for admission, accepted pt.   - Juan Carlos Washburn PA-C

## 2022-07-19 NOTE — H&P ADULT - NSHPLABSRESULTS_GEN_ALL_CORE
Labs:                          7.9    9.10  )-----------( 317      ( 2022 16:23 )             26.2         135  |  98  |  28<H>  ----------------------------<  145<H>  3.4<L>   |  23  |  2.06<H>    Ca    9.7      2022 16:23    TPro  7.2  /  Alb  4.1  /  TBili  3.9<H>  /  DBili  x   /  AST  215<H>  /  ALT  242<H>  /  AlkPhos  696<H>      LIVER FUNCTIONS - ( 2022 16:23 )  Alb: 4.1 g/dL / Pro: 7.2 g/dL / ALK PHOS: 696 U/L / ALT: 242 U/L / AST: 215 U/L / GGT: x           PT/INR - ( 2022 18:34 )   PT: 11.4 sec;   INR: 0.99 ratio         PTT - ( 2022 18:34 )  PTT:32.0 sec  CAPILLARY BLOOD GLUCOSE    Blood Gas Profile - Venous (22 @ 16:22)    pH, Venous: 7.36: No collection time indicated, please interpret with caution    pCO2, Venous: 44 mmHg    pO2, Venous: 22 mmHg    HCO3, Venous: 25 mmol/L    Base Excess, Venous: -0.6 mmol/L    Oxygen Saturation, Venous: 24.9 %    Total CO2, Venous: 26 mmol/L  Blood Gas Venous - Lactate: 1.1 mmol/L (22 @ 16:22)    Lipase, Serum: 26 U/L (22 @ 16:23)    Urinalysis Basic - ( 2022 21:45 )    Color: Dark Yellow / Appearance: Turbid / S.029 / pH: x  Gluc: x / Ketone: Negative  / Bili: Small / Urobili: Negative   Blood: x / Protein: 30 mg/dL / Nitrite: Negative   Leuk Esterase: Large / RBC: 6 /hpf /  /HPF   Sq Epi: x / Non Sq Epi: 9 /hpf / Bacteria: Negative        Micro:  COVID neg    RADIOLOGY & ADDITIONAL TESTS:    US/CT/MRI:  < from: CT Abdomen and Pelvis w/ IV Cont (22 @ 17:24) >    FINDINGS:  LOWER CHEST: Within normal limits.    LIVER: Within normal limits.  BILE DUCTS: Diffuse intrahepatic and extra hepatic biliary ductal   dilatation with common bile duct measuring 1.3 cm with abrupt narrowing   near the ampulla.  GALLBLADDER: Distended gallbladder with sludge. No gallbladder wall   thickening or pericholecystic edema.  SPLEEN: Within normal limits.  PANCREAS: No focal hypoenhancing lesion. Normal caliber main pancreatic   duct.  ADRENALS: Within normal limits.  KIDNEYS/URETERS: No hydronephrosis. Bilateral renal cysts and hypodense   renal foci too small to characterize.    BLADDER: Minimally distended.  REPRODUCTIVE ORGANS: Uterus and adnexa within normal limits.    BOWEL: No bowel obstruction. Colonic diverticulosis. Appendix is normal.  PERITONEUM: No ascites.  VESSELS: Atherosclerotic changes.  RETROPERITONEUM/LYMPH NODES: No lymphadenopathy.  ABDOMINAL WALL: Tiny fat-containing umbilical hernia.  BONES: Degenerative changes. Grade 1 anterolisthesis of L5 on S1.    IMPRESSION:  Diffuse intrahepatic and extra hepatic biliary ductal dilatation with   common bile duct measuring 1.3 cm with abrupt narrowing near the ampulla.   Biliary obstruction possibly secondary to ampullary mass, biliary   stricture or small stone. No focal hypoenhancing pancreatic lesion or   dilatation of the main pancreatic duct. Further evaluation can be   obtained with MRCP with IV contrast.    < end of copied text > Labs:                   7.9    9.10  )-----------( 317      ( 2022 16:23 )             26.2         135  |  98  |  28<H>  ----------------------------<  145<H>  3.4<L>   |  23  |  2.06<H>    Ca    9.7      2022 16:23    TPro  7.2  /  Alb  4.1  /  TBili  3.9<H>  /  DBili  x   /  AST  215<H>  /  ALT  242<H>  /  AlkPhos  696<H>      LIVER FUNCTIONS - ( 2022 16:23 )  Alb: 4.1 g/dL / Pro: 7.2 g/dL / ALK PHOS: 696 U/L / ALT: 242 U/L / AST: 215 U/L / GGT: x           PT/INR - ( 2022 18:34 )   PT: 11.4 sec;   INR: 0.99 ratio       PTT - ( 2022 18:34 )  PTT:32.0 sec  CAPILLARY BLOOD GLUCOSE    Blood Gas Profile - Venous (22 @ 16:22)    pH, Venous: 7.36: No collection time indicated, please interpret with caution    pCO2, Venous: 44 mmHg    pO2, Venous: 22 mmHg    HCO3, Venous: 25 mmol/L    Base Excess, Venous: -0.6 mmol/L    Oxygen Saturation, Venous: 24.9 %    Total CO2, Venous: 26 mmol/L  Blood Gas Venous - Lactate: 1.1 mmol/L (22 @ 16:22)    Lipase, Serum: 26 U/L (22 @ 16:23)    Urinalysis Basic - ( 2022 21:45 )    Color: Dark Yellow / Appearance: Turbid / S.029 / pH: x  Gluc: x / Ketone: Negative  / Bili: Small / Urobili: Negative   Blood: x / Protein: 30 mg/dL / Nitrite: Negative   Leuk Esterase: Large / RBC: 6 /hpf /  /HPF   Sq Epi: x / Non Sq Epi: 9 /hpf / Bacteria: Negative    Micro:  COVID neg    RADIOLOGY & ADDITIONAL TESTS:    US/CT/MRI:  < from: CT Abdomen and Pelvis w/ IV Cont (22 @ 17:24) >    FINDINGS:  LOWER CHEST: Within normal limits.    LIVER: Within normal limits.  BILE DUCTS: Diffuse intrahepatic and extra hepatic biliary ductal   dilatation with common bile duct measuring 1.3 cm with abrupt narrowing   near the ampulla.  GALLBLADDER: Distended gallbladder with sludge. No gallbladder wall   thickening or pericholecystic edema.  SPLEEN: Within normal limits.  PANCREAS: No focal hypoenhancing lesion. Normal caliber main pancreatic   duct.  ADRENALS: Within normal limits.  KIDNEYS/URETERS: No hydronephrosis. Bilateral renal cysts and hypodense   renal foci too small to characterize.    BLADDER: Minimally distended.  REPRODUCTIVE ORGANS: Uterus and adnexa within normal limits.    BOWEL: No bowel obstruction. Colonic diverticulosis. Appendix is normal.  PERITONEUM: No ascites.  VESSELS: Atherosclerotic changes.  RETROPERITONEUM/LYMPH NODES: No lymphadenopathy.  ABDOMINAL WALL: Tiny fat-containing umbilical hernia.  BONES: Degenerative changes. Grade 1 anterolisthesis of L5 on S1.    IMPRESSION:  Diffuse intrahepatic and extra hepatic biliary ductal dilatation with   common bile duct measuring 1.3 cm with abrupt narrowing near the ampulla.   Biliary obstruction possibly secondary to ampullary mass, biliary   stricture or small stone. No focal hypoenhancing pancreatic lesion or   dilatation of the main pancreatic duct. Further evaluation can be   obtained with MRCP with IV contrast.  < end of copied text >    I have reviewed the labs, imaging and EKG

## 2022-07-19 NOTE — H&P ADULT - PROBLEM SELECTOR PLAN 1
Pruritis, pale stools, jaundice, dark urine, intermittent RUQ pain  Poor appetite past few days, denies weight loss.   Elevated ALP, AST, ALT-- cholestatic pattern  CT: Biliary obstruction, ddx ampullary mass, cholangio, panc ca  - MRCP C+ if able while CAMRON, malignancy workup  - after MRCP, onc consult if suspicious mass. Also can consider GI consult for evaluation/stent   - bili total and direct in AM, anticipate direct bilirubinemia   - hepatitis panel given recent hx of muscles consumption to r/o resolving acute hep A. Pruritis, pale stools, jaundice, dark urine, intermittent RUQ pain  Poor appetite past few days, denies weight loss.   Elevated ALP, AST, ALT-- cholestatic pattern  CT: Biliary obstruction, ddx ampullary mass, cholangio, panc ca  - MRCP C+ if able while CAMRON, malignancy workup  - after MRCP, onc consult if suspicious mass. Also can consider GI consult for evaluation/stent   - bili total and direct in AM, anticipate direct bilirubinemia   - hepatitis panel given recent hx of muscles consumption to r/o resolving acute hep A.  - no fevers, but reports chills. F/u BCx and UCx, though low suspicion for cholangitis and not septic so monitor off abx. Pruritis, pale stools, jaundice, dark urine, intermittent RUQ pain  Poor appetite past few days, denies weight loss.   Elevated ALP, AST, ALT-- cholestatic pattern  CT: Biliary obstruction, ddx ampullary mass, cholangio, panc ca  - MRCP C+ to eval biliary anatomy  - after MRCP, onc consult if suspicious mass  - after MRCP, GI consult for evaluation/stent if needed  - bili total and direct in AM, anticipate direct bilirubinemia   - hepatitis panel given recent hx of muscles consumption to r/o resolving acute hep A.  - no fevers, but reports chills. F/u BCx and UCx, though low suspicion for cholangitis and not septic so monitor off abx.

## 2022-07-19 NOTE — H&P ADULT - NSHPPHYSICALEXAM_GEN_ALL_CORE
ICU Vital Signs Last 24 Hrs  T(C): 36.9 (19 Jul 2022 21:34), Max: 37 (19 Jul 2022 18:36)  T(F): 98.4 (19 Jul 2022 21:34), Max: 98.6 (19 Jul 2022 18:36)  HR: 66 (19 Jul 2022 21:34) (63 - 75)  BP: 161/64 (19 Jul 2022 21:34) (116/72 - 161/64)  BP(mean): 86 (19 Jul 2022 16:37) (86 - 86)  RR: 18 (19 Jul 2022 21:34) (16 - 18)  SpO2: 97% (19 Jul 2022 21:34) (97% - 98%)    O2 Parameters below as of 19 Jul 2022 21:34  Patient On (Oxygen Delivery Method): room air    GENERAL APPEARANCE: Well developed, NAD  HEENT: Sublingual jaundice. PERRL, EOMI. hearing grossly intact.  NECK: Neck supple, non-tender no lymphadenopathy, masses or thyromegaly.  CARDIAC: Normal S1 and S2. no mrg. RRR  LUNGS: Clear to auscultation B/L, no rales, rhonchi, or wheezing  ABDOMEN: Mildly diffusely tender, worst in RUQ. Otherwise soft, normal BS.   MUSCULOSKELETAL: ROM intact.  No joint erythema or tenderness.   EXTREMITIES: trace b/l LE edema. Peripheral pulses intact.   NEUROLOGICAL: Non focal. Strength and sensation symmetric and intact throughout.   SKIN: Visible scratches from itching. Left superior back.   PSYCHIATRIC: AOx4 , Normal mood and affect

## 2022-07-19 NOTE — ED PROVIDER NOTE - NS_EDPROVIDERDISPOUSERTYPE_ED_A_ED
Patient answered NO to all of the above 3 questions...
Attending Attestation (For Attendings USE Only)...

## 2022-07-19 NOTE — ED PROVIDER NOTE - PHYSICAL EXAMINATION
CONSTITUTIONAL: Patient is awake, alert and oriented x 3. Patient is well appearing and in no acute distress.  HEAD: NC/AT  EYES: PERRL b/l, EOMI  ENT: Airway patent, nasal mucosa clear, +yellowing under tongue. Throat has no vesicles, no oropharyngeal exudates and uvula is midline.  NECK: supple, no LAD  LUNGS: CTAB, no increased WOB, no wheezing/rales appreciated  HEART: RRR.+S1S2, no murmurs appreciated  ABDOMEN: Soft nd/nt, no rebound or guarding.   EXTREMITY: WWP, no edema or calf tenderness b/l, full ROM upper and lower ext b/l  SKIN: +few excoriations from itchiness, +mild jaundice

## 2022-07-19 NOTE — ED PROCEDURE NOTE - PROCEDURE ADDITIONAL DETAILS
Emergency Department Focused Ultrasound performed at patient's bedside for educational purposes. An appropriate follow up study is ordered. -Rivera Henderson PA-C Emergency Department Focused Ultrasound performed at patient's bedside for educational purposes. right renal cyst was noted and discussed with pt. An appropriate follow up study is ordered. -Rivera Henderson PA-C

## 2022-07-19 NOTE — ED PROVIDER NOTE - CARE PLAN
1 Principal Discharge DX:	Stricture of bile duct  Secondary Diagnosis:	Elevated LFTs  Secondary Diagnosis:	CAMRON (acute kidney injury)

## 2022-07-19 NOTE — ED ADULT NURSE NOTE - OBJECTIVE STATEMENT
88 y/o Female presenting to the ED ambulatory, A&Ox3, complaining of abdominal pain, dark urine, jaundice and itchiness x 1 week. Pt seen outpatient and sent for CT of the abdomen to assess the liver. Pt seen by q-erica and arrives back with IV in place, blood resulted. Pt denies fever, chills, N/V/D, hematuria, dysuria, back pain, headache, dizziness. Abdomen soft, nontender, nondistended.  Pt well appearing, in no current distress. Safety and comfort measures provided, bed locked and in lowest position, side rails up for safety. Call bell within reach.

## 2022-07-19 NOTE — H&P ADULT - ATTENDING COMMENTS
89F w/ pmhx of HTN and HLD p/w 1 week of itching, orange urine, pale stools, and intermittent RUQ abdominal pain found to have cholestatic pattern transaminitis and CT AP concerning for biliary dilatation 2/2 obstruction now requiring further work up and also with worsening anemia as well as likely CAMRON. Suspicious for malignancy.  -F/u UCx  -Gentle IVF overnight  -MRCP w/ contrast  -GI consult eventually  -F/u rectic count/ iron and anemia work up  -DVT PPx, SCDs for now

## 2022-07-19 NOTE — H&P ADULT - PROBLEM SELECTOR PLAN 3
Hb 11 in 3/2022  Hb on arrival 7.9, MCV 96  No obvious signs of bleeding, hemodynamically stable, FOBT negative.  Reported dark stool 3 weeks ago, no PO iron use.  - empiric protonox 40 IV qd for now  - monitor Hb, trasnfuse to Hb > 7  - retic in AM  - iron studies in AM Hb 11 in 3/2022  Hb on arrival 7.9, MCV 96  No obvious signs of bleeding, hemodynamically stable, FOBT negative.  Reported dark stool 3 weeks ago, no PO iron use.  - empiric protonox 40 IV BID for now  - monitor Hb, trasnfuse to Hb > 7  - retic in AM  - iron studies in AM  - B12 folate in AM

## 2022-07-19 NOTE — ED PROVIDER NOTE - NS ED ATTENDING STATEMENT MOD
This was a shared visit with the TOMI. I reviewed and verified the documentation and independently performed the documented:

## 2022-07-19 NOTE — H&P ADULT - PROBLEM SELECTOR PLAN 4
Patient cannot recall home med, pill once per day "takes most days." Reports home BPs usually 115-120s systolic.   Mostly normotensive, 1 reading of systolic 160s, asymptomatic.   - will hold BP meds for now  - med rec in AM and consider resuming inpatient.

## 2022-07-19 NOTE — H&P ADULT - NSICDXFAMILYHX_GEN_ALL_CORE_FT
FAMILY HISTORY:  Mother  Still living? No  Family history of diabetes mellitus (DM), Age at diagnosis: Age Unknown    Child  Still living? Yes, Estimated age: Age Unknown  FH: liver cancer, Age at diagnosis: Age Unknown

## 2022-07-19 NOTE — H&P ADULT - ASSESSMENT
89yof with HTN and HLD presented to outpatient doctor with 1 week of itching, orange urine, pale stools, and intermittent RUQ abdominal pain found to have cholestatic pattern transaminitis and CT AP concerning for biliary dilatation 2/2 obstruction, admitted for malignancy workup.

## 2022-07-19 NOTE — ASSESSMENT
[FreeTextEntry1] : The patient appears to have obstructive jaundice is likely secondary to pancreatic, biliary or hepatic disease.  In view of her age and the fact that she is not drinking or eating I have sent her to the emergency room for further work-up

## 2022-07-19 NOTE — H&P ADULT - NSHPREVIEWOFSYSTEMS_GEN_ALL_CORE
CONSTITUTIONAL:  + chills, + anorexia, no fevers or weakness.   HEENT:  Eyes:  No visual loss, blurred vision, double vision or yellow sclerae. Ears, Nose, Throat:  No hearing loss, sneezing, congestion, runny nose or sore throat.  SKIN:  + itching.  CARDIOVASCULAR:  No chest pain, chest pressure or chest discomfort. No palpitations.  RESPIRATORY:  No shortness of breath, cough or sputum.  GASTROINTESTINAL:  + anorexia, + vomiting x1, no diarrhea. +abdominal pain, + melena 3 wks prior to admission.   GENITOURINARY:  Denies hematuria, dysuria.   NEUROLOGICAL:  No headache, dizziness, syncope, paralysis, ataxia, numbness or tingling in the extremities. No change in bowel or bladder control.  MUSCULOSKELETAL:  No muscle, back pain, joint pain or stiffness.  HEMATOLOGIC:  No symptomatic anemia, bleeding or bruising.  LYMPHATICS:  No enlarged nodes.   PSYCHIATRIC:  No history of depression or anxiety.  ENDOCRINOLOGIC:  No reports of sweating, cold or heat intolerance. No polyuria or polydipsia.  ALLERGIES:  No history of asthma, hives, eczema or rhinitis.

## 2022-07-19 NOTE — ED PROVIDER NOTE - OBJECTIVE STATEMENT
89 yo female with PMHx of HTN, HLD, GERD was sent from PCP for a CT scan due to have having 1 week of generalized pruritis, generalized discomfort/malaise, light colored stool, dark orange urine. Pt also endorsed intermittent RUQ discomfort, N/V for 3 days, lightheadedness/dizziness. Denied chest pain, SOB, fevers, chills, changes in medication.

## 2022-07-19 NOTE — H&P ADULT - PROBLEM SELECTOR PLAN 2
Unclear recent baseline, but SCr 1.58 in 2018, patient denies Hx CKD however.   SCr on arrival 2.06  Poor PO hydration and intake recently; possibly pre-renal.   - trend for now, might bump since had CT C+ done   - avoid nephrotoxins  - consider resolution prior to MRI +C? Unclear recent baseline, but SCr 1.58 in 2018, patient denies Hx CKD however.   SCr on arrival 2.06  Poor PO hydration and intake recently; possibly pre-renal.   Of note has 5cm b/l renal cysts seen on CTAP.   - trend for now, might bump since had CT C+ done   - avoid nephrotoxins  - consider resolution prior to MRI +C?

## 2022-07-19 NOTE — HISTORY OF PRESENT ILLNESS
[FreeTextEntry8] : The patient is an 89-year-old female who has been in relatively good health and treated for hypertension, hyperlipidemia, GERD and stress incontinence.  Approximately 4 days prior to admission she noted that her stool was light, urine orange color she started having itching of her skin loss of appetite and discomfiture in the epigastric region.  The symptoms were all new and she came in today for further evaluation.  She denied blood in her stool or black stool nausea vomiting I am doing it but

## 2022-07-19 NOTE — H&P ADULT - CONVERSATION DETAILS
Discussed code status with patient, despite no anticipation of acute adverse outcome. At this time patient is very understanding of code status, she mentions her  passing away after debilitating stroke. At this point she mentions she is there for her sons, and is to remain FULL CODE. Can be further discussed if needed as patient wishes to consider, depending on her clinical status.

## 2022-07-19 NOTE — PHYSICAL EXAM
[Normal] : no posterior cervical lymphadenopathy and no anterior cervical lymphadenopathy [de-identified] : The patient is mildly jaundiced and scratching well in no acute distress [de-identified] : Mildly tender liver region with a possible edge in the epigastric region spleen is not palpable no focal tenderness otherwise on mass [de-identified] : Icteric [de-identified] : Scratch marks and mildly icteric [FreeTextEntry1] : Jamie colored guaiac-negative stool

## 2022-07-19 NOTE — ED PROVIDER NOTE - CLINICAL SUMMARY MEDICAL DECISION MAKING FREE TEXT BOX
88 yo female with PMHx of HTN, HLD, GERD was sent from PCP for a CT scan due to have having 1 week of generalized pruritis, generalized discomfort/malaise, light colored stool, dark orange urine. Pt also endorsed intermittent RUQ discomfort, N/V for 3 days, lightheadedness/dizziness. VSS, afebrile, on exam, pt in NAD, slight jaundice (yellowing under tongue), without RUQ discomfort at time of exam but noted intermittence of discomfort, labs remarkable for elevated transaminases, T. bili - c/f liver or biliary pathology, CAMRON (Cr ~2 above baseline of 1.5), Hgb 7.9 (baseline ~11), CT-AP with hepatic bile duct dilation with CBD 1.3 2/2 mass or biliary stricture, c/f cholangitis, will get blood cultures, coags. Unclear etiology for anemia, pt noted to have melena before getting light colored stool, but has not had melena since, will get guaiac stool, T&S. Likely will admit for MRCP, possible abx.

## 2022-07-20 ENCOUNTER — APPOINTMENT (OUTPATIENT)
Dept: ULTRASOUND IMAGING | Facility: CLINIC | Age: 87
End: 2022-07-20

## 2022-07-20 DIAGNOSIS — E78.5 HYPERLIPIDEMIA, UNSPECIFIED: ICD-10-CM

## 2022-07-20 DIAGNOSIS — K83.1 OBSTRUCTION OF BILE DUCT: ICD-10-CM

## 2022-07-20 DIAGNOSIS — D64.9 ANEMIA, UNSPECIFIED: ICD-10-CM

## 2022-07-20 DIAGNOSIS — I10 ESSENTIAL (PRIMARY) HYPERTENSION: ICD-10-CM

## 2022-07-20 DIAGNOSIS — Z29.9 ENCOUNTER FOR PROPHYLACTIC MEASURES, UNSPECIFIED: ICD-10-CM

## 2022-07-20 DIAGNOSIS — Z98.891 HISTORY OF UTERINE SCAR FROM PREVIOUS SURGERY: Chronic | ICD-10-CM

## 2022-07-20 DIAGNOSIS — N17.9 ACUTE KIDNEY FAILURE, UNSPECIFIED: ICD-10-CM

## 2022-07-20 LAB
ALBUMIN SERPL ELPH-MCNC: 3.3 G/DL — SIGNIFICANT CHANGE UP (ref 3.3–5)
ALBUMIN SERPL ELPH-MCNC: 4.1 G/DL
ALP BLD-CCNC: 720 U/L
ALP SERPL-CCNC: 649 U/L — HIGH (ref 40–120)
ALT FLD-CCNC: 231 U/L — HIGH (ref 10–45)
ALT SERPL-CCNC: 237 U/L
AMYLASE/CREAT SERPL: 29 U/L
ANION GAP SERPL CALC-SCNC: 16 MMOL/L
ANION GAP SERPL CALC-SCNC: 17 MMOL/L — SIGNIFICANT CHANGE UP (ref 5–17)
AST SERPL-CCNC: 207 U/L — HIGH (ref 10–40)
AST SERPL-CCNC: 214 U/L
BASOPHILS # BLD AUTO: 0.03 K/UL — SIGNIFICANT CHANGE UP (ref 0–0.2)
BASOPHILS # BLD AUTO: 0.06 K/UL
BASOPHILS NFR BLD AUTO: 0.3 % — SIGNIFICANT CHANGE UP (ref 0–2)
BASOPHILS NFR BLD AUTO: 0.6 %
BILIRUB SERPL-MCNC: 3.6 MG/DL
BILIRUB SERPL-MCNC: 4.2 MG/DL — HIGH (ref 0.2–1.2)
BUN SERPL-MCNC: 25 MG/DL
BUN SERPL-MCNC: 32 MG/DL — HIGH (ref 7–23)
CALCIUM SERPL-MCNC: 8.9 MG/DL — SIGNIFICANT CHANGE UP (ref 8.4–10.5)
CALCIUM SERPL-MCNC: 9.4 MG/DL
CHLORIDE SERPL-SCNC: 97 MMOL/L — SIGNIFICANT CHANGE UP (ref 96–108)
CHLORIDE SERPL-SCNC: 99 MMOL/L
CO2 SERPL-SCNC: 17 MMOL/L — LOW (ref 22–31)
CO2 SERPL-SCNC: 20 MMOL/L
CREAT ?TM UR-MCNC: 68 MG/DL — SIGNIFICANT CHANGE UP
CREAT SERPL-MCNC: 1.95 MG/DL
CREAT SERPL-MCNC: 2.81 MG/DL — HIGH (ref 0.5–1.3)
EGFR: 16 ML/MIN/1.73M2 — LOW
EGFR: 24 ML/MIN/1.73M2
EOSINOPHIL # BLD AUTO: 0.02 K/UL
EOSINOPHIL # BLD AUTO: 0.06 K/UL — SIGNIFICANT CHANGE UP (ref 0–0.5)
EOSINOPHIL NFR BLD AUTO: 0.2 %
EOSINOPHIL NFR BLD AUTO: 0.7 % — SIGNIFICANT CHANGE UP (ref 0–6)
GLUCOSE SERPL-MCNC: 108 MG/DL — HIGH (ref 70–99)
GLUCOSE SERPL-MCNC: 180 MG/DL
HAV IGM SER-ACNC: SIGNIFICANT CHANGE UP
HBV CORE IGM SER-ACNC: SIGNIFICANT CHANGE UP
HBV SURFACE AG SER-ACNC: SIGNIFICANT CHANGE UP
HCT VFR BLD CALC: 25.1 % — LOW (ref 34.5–45)
HCT VFR BLD CALC: 27.4 %
HCV AB S/CO SERPL IA: 0.11 S/CO — SIGNIFICANT CHANGE UP (ref 0–0.99)
HCV AB SERPL-IMP: SIGNIFICANT CHANGE UP
HGB BLD-MCNC: 7.8 G/DL — LOW (ref 11.5–15.5)
HGB BLD-MCNC: 8.2 G/DL
IMM GRANULOCYTES NFR BLD AUTO: 0.6 % — SIGNIFICANT CHANGE UP (ref 0–1.5)
IMM GRANULOCYTES NFR BLD AUTO: 0.7 %
IRON SATN MFR SERPL: 13 % — LOW (ref 14–50)
IRON SATN MFR SERPL: 41 UG/DL — SIGNIFICANT CHANGE UP (ref 30–160)
LYMPHOCYTES # BLD AUTO: 0.76 K/UL
LYMPHOCYTES # BLD AUTO: 0.86 K/UL — LOW (ref 1–3.3)
LYMPHOCYTES # BLD AUTO: 10 % — LOW (ref 13–44)
LYMPHOCYTES NFR BLD AUTO: 7.8 %
MAGNESIUM SERPL-MCNC: 2.2 MG/DL — SIGNIFICANT CHANGE UP (ref 1.6–2.6)
MAN DIFF?: NORMAL
MCHC RBC-ENTMCNC: 28.3 PG
MCHC RBC-ENTMCNC: 28.4 PG — SIGNIFICANT CHANGE UP (ref 27–34)
MCHC RBC-ENTMCNC: 29.9 GM/DL
MCHC RBC-ENTMCNC: 31.1 GM/DL — LOW (ref 32–36)
MCV RBC AUTO: 91.3 FL — SIGNIFICANT CHANGE UP (ref 80–100)
MCV RBC AUTO: 94.5 FL
MONOCYTES # BLD AUTO: 0.69 K/UL — SIGNIFICANT CHANGE UP (ref 0–0.9)
MONOCYTES # BLD AUTO: 0.78 K/UL
MONOCYTES NFR BLD AUTO: 8 %
MONOCYTES NFR BLD AUTO: 8 % — SIGNIFICANT CHANGE UP (ref 2–14)
NEUTROPHILS # BLD AUTO: 6.9 K/UL — SIGNIFICANT CHANGE UP (ref 1.8–7.4)
NEUTROPHILS # BLD AUTO: 8.05 K/UL
NEUTROPHILS NFR BLD AUTO: 80.4 % — HIGH (ref 43–77)
NEUTROPHILS NFR BLD AUTO: 82.7 %
NRBC # BLD: 0 /100 WBCS — SIGNIFICANT CHANGE UP (ref 0–0)
OSMOLALITY UR: 179 MOS/KG — LOW (ref 300–900)
PHOSPHATE SERPL-MCNC: 3.2 MG/DL — SIGNIFICANT CHANGE UP (ref 2.5–4.5)
PLATELET # BLD AUTO: 289 K/UL — SIGNIFICANT CHANGE UP (ref 150–400)
PLATELET # BLD AUTO: 399 K/UL
POTASSIUM SERPL-MCNC: 3.5 MMOL/L — SIGNIFICANT CHANGE UP (ref 3.5–5.3)
POTASSIUM SERPL-SCNC: 3.5 MMOL/L
POTASSIUM SERPL-SCNC: 3.5 MMOL/L — SIGNIFICANT CHANGE UP (ref 3.5–5.3)
POTASSIUM UR-SCNC: 15 MMOL/L — SIGNIFICANT CHANGE UP
PROT ?TM UR-MCNC: 13 MG/DL — HIGH (ref 0–12)
PROT SERPL-MCNC: 6.5 G/DL — SIGNIFICANT CHANGE UP (ref 6–8.3)
PROT SERPL-MCNC: 7.1 G/DL
PROT/CREAT UR-RTO: 0.2 RATIO — SIGNIFICANT CHANGE UP (ref 0–0.2)
RBC # BLD: 2.75 M/UL — LOW (ref 3.8–5.2)
RBC # BLD: 2.9 M/UL
RBC # FLD: 15.1 % — HIGH (ref 10.3–14.5)
RBC # FLD: 15.4 %
SODIUM SERPL-SCNC: 131 MMOL/L — LOW (ref 135–145)
SODIUM SERPL-SCNC: 135 MMOL/L
SODIUM UR-SCNC: 15 MMOL/L — SIGNIFICANT CHANGE UP
TIBC SERPL-MCNC: 312 UG/DL — SIGNIFICANT CHANGE UP (ref 220–430)
UIBC SERPL-MCNC: 271 UG/DL — SIGNIFICANT CHANGE UP (ref 110–370)
WBC # BLD: 8.59 K/UL — SIGNIFICANT CHANGE UP (ref 3.8–10.5)
WBC # FLD AUTO: 8.59 K/UL — SIGNIFICANT CHANGE UP (ref 3.8–10.5)
WBC # FLD AUTO: 9.74 K/UL

## 2022-07-20 PROCEDURE — 99222 1ST HOSP IP/OBS MODERATE 55: CPT | Mod: GC

## 2022-07-20 PROCEDURE — 74181 MRI ABDOMEN W/O CONTRAST: CPT | Mod: 26

## 2022-07-20 PROCEDURE — 99233 SBSQ HOSP IP/OBS HIGH 50: CPT | Mod: GC

## 2022-07-20 RX ORDER — SODIUM CHLORIDE 9 MG/ML
1000 INJECTION, SOLUTION INTRAVENOUS
Refills: 0 | Status: DISCONTINUED | OUTPATIENT
Start: 2022-07-20 | End: 2022-07-21

## 2022-07-20 RX ORDER — PANTOPRAZOLE SODIUM 20 MG/1
40 TABLET, DELAYED RELEASE ORAL DAILY
Refills: 0 | Status: DISCONTINUED | OUTPATIENT
Start: 2022-07-20 | End: 2022-07-20

## 2022-07-20 RX ORDER — ATENOLOL 25 MG/1
25 TABLET ORAL DAILY
Refills: 0 | Status: DISCONTINUED | OUTPATIENT
Start: 2022-07-20 | End: 2022-07-27

## 2022-07-20 RX ORDER — SODIUM CHLORIDE 9 MG/ML
1000 INJECTION, SOLUTION INTRAVENOUS ONCE
Refills: 0 | Status: COMPLETED | OUTPATIENT
Start: 2022-07-20 | End: 2022-07-20

## 2022-07-20 RX ORDER — SODIUM CHLORIDE 9 MG/ML
1000 INJECTION INTRAMUSCULAR; INTRAVENOUS; SUBCUTANEOUS
Refills: 0 | Status: DISCONTINUED | OUTPATIENT
Start: 2022-07-20 | End: 2022-07-21

## 2022-07-20 RX ORDER — ATORVASTATIN CALCIUM 80 MG/1
20 TABLET, FILM COATED ORAL AT BEDTIME
Refills: 0 | Status: DISCONTINUED | OUTPATIENT
Start: 2022-07-20 | End: 2022-07-20

## 2022-07-20 RX ORDER — LANOLIN ALCOHOL/MO/W.PET/CERES
3 CREAM (GRAM) TOPICAL AT BEDTIME
Refills: 0 | Status: DISCONTINUED | OUTPATIENT
Start: 2022-07-20 | End: 2022-07-28

## 2022-07-20 RX ORDER — PANTOPRAZOLE SODIUM 20 MG/1
40 TABLET, DELAYED RELEASE ORAL EVERY 12 HOURS
Refills: 0 | Status: DISCONTINUED | OUTPATIENT
Start: 2022-07-20 | End: 2022-07-26

## 2022-07-20 RX ORDER — ZOLPIDEM TARTRATE 10 MG/1
5 TABLET ORAL AT BEDTIME
Refills: 0 | Status: DISCONTINUED | OUTPATIENT
Start: 2022-07-20 | End: 2022-07-28

## 2022-07-20 RX ORDER — ATENOLOL 25 MG/1
25 TABLET ORAL DAILY
Refills: 0 | Status: DISCONTINUED | OUTPATIENT
Start: 2022-07-20 | End: 2022-07-20

## 2022-07-20 RX ADMIN — PANTOPRAZOLE SODIUM 40 MILLIGRAM(S): 20 TABLET, DELAYED RELEASE ORAL at 17:28

## 2022-07-20 RX ADMIN — SODIUM CHLORIDE 80 MILLILITER(S): 9 INJECTION, SOLUTION INTRAVENOUS at 20:47

## 2022-07-20 RX ADMIN — SODIUM CHLORIDE 1000 MILLILITER(S): 9 INJECTION, SOLUTION INTRAVENOUS at 10:23

## 2022-07-20 RX ADMIN — SODIUM CHLORIDE 100 MILLILITER(S): 9 INJECTION INTRAMUSCULAR; INTRAVENOUS; SUBCUTANEOUS at 05:25

## 2022-07-20 RX ADMIN — PANTOPRAZOLE SODIUM 40 MILLIGRAM(S): 20 TABLET, DELAYED RELEASE ORAL at 05:26

## 2022-07-20 RX ADMIN — Medication 3 MILLIGRAM(S): at 22:46

## 2022-07-20 NOTE — ED ADULT NURSE REASSESSMENT NOTE - NS ED NURSE REASSESS COMMENT FT1
20 G peripheral access device to the right AC had been accidentally removed. Gauze placed on site. 20G peripheral access device to the left top of the hand placed. Pt had watch on the left wrist, watch placed in pt's purse and with pt.

## 2022-07-20 NOTE — PROGRESS NOTE ADULT - SUBJECTIVE AND OBJECTIVE BOX
**************************************  Alex Brooke, PGY-1  Senior Resident: Davon Faulkner  After 7PM, please contact night float at #[phone] or [phone]  **************************************    INTERVAL HPI/OVERNIGHT EVENTS:  Pt endorses decreased appetite for the past 4 days. Reports chills but no fevers overnight. Pt states that she has been experiencing itchiness in her forearms for the past week. Pt denies, chest pain, abdominal pain, nausea, vomiting, weakness, dizziness, shorntess of breath. Pt reports that her orange color in the urine is less pronounced as of this AM. Pt states that she had a bowel movement in the AM, that was pale in color.     VITAL SIGNS:  T(F): 97.6 (22 @ 10:06)  HR: 54 (22 @ 10:06)  BP: 131/75 (22 @ 10:06)  RR: 20 (22 @ 10:06)  SpO2: 94% (22 @ 10:06)  Wt(kg): --    PHYSICAL EXAM:    Constitutional: WDWN, NAD  HEENT: PERRL, EOMI, sclera non-icteric, neck supple, trachea midline, no masses, no JVD, MMM, good dentition  Respiratory: CTA b/l, good air entry b/l, no wheezing, no rhonchi, no rales, without accessory muscle use and no intercostal retractions  Cardiovascular: RRR, normal S1S2, no M/R/G  Gastrointestinal: soft, NTND, no masses palpable, BS normal  Extremities: Warm, well perfused, pulses equal bilateral upper and lower extremities, no edema, no clubbing. Capillary refill <2 sec  Neurological: AAOx3, CN Grossly intact  Skin: Normal temperature, warm, dry    MEDICATIONS  (STANDING):  pantoprazole  Injectable 40 milliGRAM(s) IV Push every 12 hours  sodium chloride 0.9%. 1000 milliLiter(s) (100 mL/Hr) IV Continuous <Continuous>    MEDICATIONS  (PRN):      Allergies    penicillin (Unknown)  tetracaine (Unknown)    Intolerances        LABS:                        7.8    8.59  )-----------( 289      ( 2022 06:58 )             25.1     07-20    131<L>  |  97  |  32<H>  ----------------------------<  108<H>  3.5   |  17<L>  |  2.81<H>    Ca    8.9      2022 06:58  Phos  3.2     07-20  Mg     2.2     07-20    TPro  6.5  /  Alb  3.3  /  TBili  4.2<H>  /  DBili  x   /  AST  207<H>  /  ALT  231<H>  /  AlkPhos  649<H>  07-20    PT/INR - ( 2022 18:34 )   PT: 11.4 sec;   INR: 0.99 ratio         PTT - ( 2022 18:34 )  PTT:32.0 sec  Urinalysis Basic - ( 2022 21:45 )    Color: Dark Yellow / Appearance: Turbid / S.029 / pH: x  Gluc: x / Ketone: Negative  / Bili: Small / Urobili: Negative   Blood: x / Protein: 30 mg/dL / Nitrite: Negative   Leuk Esterase: Large / RBC: 6 /hpf /  /HPF   Sq Epi: x / Non Sq Epi: 9 /hpf / Bacteria: Negative        RADIOLOGY & ADDITIONAL TESTS:  Reviewed

## 2022-07-20 NOTE — PROGRESS NOTE ADULT - PROBLEM SELECTOR PLAN 3
Hgb of 11 as of March, 2022  Hb on arrival 7.9, MCV 96  No obvious signs of bleeding, hemodynamically stable, FOBT negative.  Reported melena 3 weeks ago, no PO iron use.  - empiric protonox 40 IV BID for now  - monitor Hb, trasnfuse to Hb > 7  - reticulocyte study pending   - iron studies wnl  - B12 folate pending   - FOBT negative

## 2022-07-20 NOTE — PROGRESS NOTE ADULT - PROBLEM SELECTOR PLAN 2
- Creatinine up to 2.8 (7/20) this AM from admission level of 2.016  Previous creatinine of Cr 1.58 (2018) patient denies Hx CKD however.   Poor PO hydration and intake x 4 days,   - Received 1L bolus of NS this AM, on 100 ml/hr of NS for follow-up 1L of NS.   - F/u urine lytes  Of note has 5cm b/l renal cysts seen on CTAP.   - less likely contrast nephropathy due to <48 hour time window since receiving last contrast.   - avoid nephrotoxins - Creatinine up to 2.8 (7/20) this AM from admission level of 2.016  Previous creatinine of Cr 1.58 (2018) patient denies Hx CKD however.   Poor PO hydration and intake x 4 days,   - Received 1L bolus of NS this AM, on 100 ml/hr of NS for follow-up 1L of NS.   - F/u urine lytes  Of note has 5cm b/l renal cysts seen on CTAP.   - less likely contrast nephropathy due to <48 hour time window since receiving last contrast.   - avoid nephrotoxins  - hydrochlorothiazide 25 mg daily and losartan 100 mg daily currently held given CAMRON on CKD.

## 2022-07-20 NOTE — ED ADULT NURSE REASSESSMENT NOTE - NS ED NURSE REASSESS COMMENT FT1
Pt states she is hungry, pt provided sandwich, crackers, and water. Patient admitted to [facility]. Admission band applied to patient utilizing two patient identifiers. Patient updated on plan of care.

## 2022-07-20 NOTE — ED ADULT NURSE REASSESSMENT NOTE - NS ED NURSE REASSESS COMMENT FT1
Pt observed resting comfortably in stretcher. Pt endorsing feeling cold. Vital signs obtained, see flow sheet. Pt provided multiple warm blankets. Pt states that a doctor stopper her IV fluid infusion. Pt placed in position of comfort. Pt educated on call bell system and provided call bell. Bed in lowest position, wheels locked, appropriate side rails raised. Pt denies needs at this time.

## 2022-07-20 NOTE — CONSULT NOTE ADULT - SUBJECTIVE AND OBJECTIVE BOX
Chief Complaint:  Patient is a 89y old  Female who presents with a chief complaint of Itching, orange urine, pale stools, RUQ abdominal pain (2022 23:58)      HPI:  Ms. Brito is an 88yo F with PMH of HTN and HLD who presents with pruritus x 1 week and RUQ pain.    Patient reports 1 week of mild, intermittent RUQ pain, "sore", associated with pruritus, not worse with meals. Reports NBNB x 1. No fevers, chills. No weight loss. No similar episodes in the past. No SOB or dyspnea. Reports minimal PO intake in the past few days prior to admission, due to reduced appetite.     In the ED: VSS, CT a/p done.    Allergies:  penicillin (Unknown)  tetracaine (Unknown)      Home Medications:  Lipitor 20 mg oral tablet: 1 tab(s) orally once a day (2022 00:08)    Hospital Medications:  pantoprazole  Injectable 40 milliGRAM(s) IV Push every 12 hours  sodium chloride 0.9%. 1000 milliLiter(s) IV Continuous <Continuous>      PMHX/PSHX:  HLD (hyperlipidemia)    GERD (gastroesophageal reflux disease)    HTN (hypertension)    H/O:         Family history:  Family history of diabetes mellitus (DM) (Mother)    FH: liver cancer (Child)        Denies family history of colon cancer/polyps, stomach cancer/polyps, pancreatic cancer/masses, liver cancer/disease, ovarian cancer and endometrial cancer.    Social History:     Tob: Denies  EtOH: As above  Illicit Drugs: Denies    ROS:   General:  No wt loss, fevers, chills, night sweats,+ fatigue  Eyes:  Good vision, no reported pain  ENT:  No sore throat, pain, runny nose, dysphagia  CV:  No pain, palpitations, hypo/hypertension  Pulm:  No dyspnea, cough, tachypnea, wheezing  GI:  As per HPI  :  No pain, bleeding, incontinence, nocturia  Muscle:  No pain, weakness  Neuro:  No weakness, tingling, memory problems  Psych:  No fatigue, insomnia, mood problems, depression  Endocrine:  No polyuria, polydipsia, cold/heat intolerance  Heme:  No petechiae, ecchymosis, easy bruisability  Skin:  No rash, tattoos, scars, edema    PHYSICAL EXAM:   GENERAL:  No acute distress  HEENT:  Normocephalic/atraumatic, +scleral icterus  CHEST:  No accessory muscle use  HEART:  Regular rate and rhythm  ABDOMEN:  Soft, non-tender, non-distended  EXTREMITIES: No cyanosis, clubbing, or edema  SKIN:  No rash  NEURO:  Alert and oriented x 3, no asterixis    Vital Signs:  Vital Signs Last 24 Hrs  T(C): 36.4 (2022 10:06), Max: 37.2 (2022 01:21)  T(F): 97.6 (2022 10:06), Max: 98.9 (2022 01:21)  HR: 54 (2022 10:06) (54 - 75)  BP: 131/75 (2022 10:06) (116/72 - 161/64)  BP(mean): 78 (2022 00:45) (78 - 86)  RR: 20 (2022 10:06) (16 - 20)  SpO2: 94% (2022 10:06) (94% - 98%)    Parameters below as of 2022 10:06  Patient On (Oxygen Delivery Method): room air      Daily Height in cm: 165.1 (2022 15:02)    Daily     LABS:                        7.8    8.59  )-----------( 289      ( 2022 06:58 )             25.1     Mean Cell Volume: 91.3 fl (22 @ 06:58)    07-20    131<L>  |  97  |  32<H>  ----------------------------<  108<H>  3.5   |  17<L>  |  2.81<H>    Ca    8.9      2022 06:58  Phos  3.2     07-20  Mg     2.2     -20    TPro  6.5  /  Alb  3.3  /  TBili  4.2<H>  /  DBili  x   /  AST  207<H>  /  ALT  231<H>  /  AlkPhos  649<H>  07-20    LIVER FUNCTIONS - ( 2022 06:58 )  Alb: 3.3 g/dL / Pro: 6.5 g/dL / ALK PHOS: 649 U/L / ALT: 231 U/L / AST: 207 U/L / GGT: x           PT/INR - ( 2022 18:34 )   PT: 11.4 sec;   INR: 0.99 ratio         PTT - ( 2022 18:34 )  PTT:32.0 sec  Urinalysis Basic - ( 2022 21:45 )    Color: Dark Yellow / Appearance: Turbid / S.029 / pH: x  Gluc: x / Ketone: Negative  / Bili: Small / Urobili: Negative   Blood: x / Protein: 30 mg/dL / Nitrite: Negative   Leuk Esterase: Large / RBC: 6 /hpf /  /HPF   Sq Epi: x / Non Sq Epi: 9 /hpf / Bacteria: Negative      Amylase Serum--      Lipase serum26       Ammonia--                          7.8    8.59  )-----------( 289      ( 2022 06:58 )             25.1                         7.9    9.10  )-----------( 317      ( 2022 16:23 )             26.2       Imaging:    ACC: [id number] EXAM:  CT ABDOMEN AND PELVIS IC                          PROCEDURE DATE:  2022          INTERPRETATION:  CLINICAL INFORMATION: Abdominal pain and jaundice.    COMPARISON: None.    CONTRAST/COMPLICATIONS:  IV Contrast: Omnipaque 350  90 cc administered   0 cc discarded  Oral Contrast: Water  Complications: None reported at time of study completion    PROCEDURE:  CT of the Abdomen and Pelvis was performed.  Arterial and Portal Venous phases were acquired.  Sagittal and coronalreformats were performed.    FINDINGS:  LOWER CHEST: Within normal limits.    LIVER: Within normal limits.  BILE DUCTS: Diffuse intrahepatic and extra hepatic biliary ductal   dilatation with common bile duct measuring 1.3 cm with abrupt narrowing   near the ampulla.  GALLBLADDER: Distended gallbladder with sludge. No gallbladder wall   thickening or pericholecystic edema.  SPLEEN: Within normal limits.  PANCREAS: No focal hypoenhancing lesion. Normal caliber main pancreatic   duct.  ADRENALS: Within normal limits.  KIDNEYS/URETERS: No hydronephrosis. Bilateral renal cysts and hypodense   renal foci too small to characterize.    BLADDER: Minimally distended.  REPRODUCTIVE ORGANS: Uterus and adnexa within normal limits.    BOWEL: No bowel obstruction. Colonic diverticulosis. Appendix is normal.  PERITONEUM: No ascites.  VESSELS: Atherosclerotic changes.  RETROPERITONEUM/LYMPH NODES: No lymphadenopathy.  ABDOMINAL WALL: Tiny fat-containing umbilical hernia.  BONES: Degenerative changes. Grade 1 anterolisthesis of L5 on S1.    IMPRESSION:  Diffuse intrahepatic and extra hepatic biliary ductal dilatation with   common bile duct measuring 1.3 cm with abrupt narrowing near the ampulla.   Biliary obstruction possibly secondary to ampullary mass, biliary   stricture or small stone. No focal hypoenhancing pancreatic lesion or   dilatation of the main pancreatic duct. Further evaluation can be   obtained with MRCP with IV contrast.            --- End of Report ---

## 2022-07-20 NOTE — PROGRESS NOTE ADULT - ASSESSMENT
89yof with HTN and HLD presenting with obstructive jaundice as evidenced by cholestatic pattern on laboratory values, transaminitis, and CT of the Abdomen/Pelvis demonstrating biliary dilation secondary to obstruction, pt is admitted and undergoing MRCP and malignancy workup.

## 2022-07-20 NOTE — PROGRESS NOTE ADULT - PROBLEM SELECTOR PLAN 1
Pruritis, pale stools, jaundice, dark urine, intermittent RUQ pain  Poor appetite past few days, denies weight loss.   Elevated ALP, AST, ALT, T-bili-- cholestatic pattern  CT: Biliary obstruction, ddx ampullary mass, cholangio, panc ca  - MRCP to eval biliary anatomy  - GI consulted for evaluation ss  - hepatitis panel negative.   - BCx and UCx drawn for low suspicion of cholangitis, Pt is afebrile WBC wnl, but pt reported chills overnight. Pruritis, pale stools, jaundice, dark urine, intermittent RUQ pain  Poor appetite past few days, denies weight loss.   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   - hepatitis panel negative.   - BCx and UCx drawn for low suspicion of cholangitis, Pt is afebrile WBC wnl, but pt reported chills overnight.

## 2022-07-20 NOTE — PATIENT PROFILE ADULT - FALL HARM RISK - RISK INTERVENTIONS

## 2022-07-20 NOTE — CONSULT NOTE ADULT - ASSESSMENT
89yo F with PMH of HTN and HLD who presents with pruritus x 1 week and RUQ pain, found to have biliary dilatation concerning for obstruction.    # Obstructive jaundice - DDx includes ampullary malignancy vs. stones/sludge vs. benign stricture. Patient is symptomatic with pruritus. No signs concerning for cholangitis. Would plan for EUS and ERCP and stent placement. Patient ate breakfast today so would plan for a procedure tentatively tomorrow.   # Anemia - Normocytic. No overt blood loss.    Recommendations:  - NPO midnight  - EUS/ERCP, tentatively tomorrow  - f/u MRCP  - Diet as tolerated  - Trend CMP, CBC, coags    Please note that the recommendations are not final until attested by an attending.    Thank you for involving us in the care of this patient. Please reach out if any further questions.    Eleazar Mark, PGY-6  Gastroenterology/Hepatology Fellow    Available on [employer] Teams  After 5PM/Weekends, please contact the on-call GI fellow: [phone]   88yo F with PMH of HTN and HLD who presents with pruritus x 1 week and RUQ pain, found to have biliary dilatation concerning for obstruction.    # Obstructive jaundice - DDx includes ampullary malignancy vs. stones/sludge vs. benign stricture. Patient is symptomatic with pruritus. No signs concerning for cholangitis. Would plan for EUS and ERCP and stent placement. Patient ate breakfast today so would plan for a procedure tentatively tomorrow.   # Anemia - Normocytic. No overt blood loss.  # CAMRON - unclear baseline, likely CAMRON on CKD    Recommendations:  - NPO midnight  - EUS/ERCP, tentatively tomorrow  - IVF  - Urine lytes  - f/u MRCP  - Anemia workup per primary team  - Diet as tolerated  - Trend CMP, CBC, coags    Please note that the recommendations are not final until attested by an attending.    Thank you for involving us in the care of this patient. Please reach out if any further questions.    Eleazar Mark, PGY-6  Gastroenterology/Hepatology Fellow    Available on [employer] Teams  After 5PM/Weekends, please contact the on-call GI fellow: [phone]

## 2022-07-21 LAB
ALBUMIN SERPL ELPH-MCNC: 3.2 G/DL — LOW (ref 3.3–5)
ALP SERPL-CCNC: 599 U/L — HIGH (ref 40–120)
ALT FLD-CCNC: 173 U/L — HIGH (ref 10–45)
ANION GAP SERPL CALC-SCNC: 17 MMOL/L — SIGNIFICANT CHANGE UP (ref 5–17)
APPEARANCE UR: CLEAR — SIGNIFICANT CHANGE UP
APTT BLD: 33.5 SEC — SIGNIFICANT CHANGE UP (ref 27.5–35.5)
AST SERPL-CCNC: 126 U/L — HIGH (ref 10–40)
BACTERIA # UR AUTO: NEGATIVE — SIGNIFICANT CHANGE UP
BILIRUB DIRECT SERPL-MCNC: 3.6 MG/DL — HIGH (ref 0–0.3)
BILIRUB INDIRECT FLD-MCNC: 0.8 MG/DL — SIGNIFICANT CHANGE UP (ref 0.2–1)
BILIRUB SERPL-MCNC: 4.4 MG/DL — HIGH (ref 0.2–1.2)
BILIRUB SERPL-MCNC: 4.4 MG/DL — HIGH (ref 0.2–1.2)
BILIRUB UR-MCNC: ABNORMAL
BUN SERPL-MCNC: 37 MG/DL — HIGH (ref 7–23)
CALCIUM SERPL-MCNC: 8.9 MG/DL — SIGNIFICANT CHANGE UP (ref 8.4–10.5)
CANCER AG125 SERPL-ACNC: 7 U/ML — SIGNIFICANT CHANGE UP
CANCER AG19-9 SERPL-ACNC: 56 U/ML — HIGH
CEA SERPL-MCNC: 1.4 NG/ML — SIGNIFICANT CHANGE UP (ref 0–3.8)
CHLORIDE SERPL-SCNC: 100 MMOL/L — SIGNIFICANT CHANGE UP (ref 96–108)
CO2 SERPL-SCNC: 16 MMOL/L — LOW (ref 22–31)
COLOR SPEC: YELLOW — SIGNIFICANT CHANGE UP
CREAT SERPL-MCNC: 3.29 MG/DL — HIGH (ref 0.5–1.3)
CULTURE RESULTS: SIGNIFICANT CHANGE UP
DIFF PNL FLD: ABNORMAL
EGFR: 13 ML/MIN/1.73M2 — LOW
EPI CELLS # UR: 2 /HPF — SIGNIFICANT CHANGE UP
GLUCOSE SERPL-MCNC: 114 MG/DL — HIGH (ref 70–99)
GLUCOSE UR QL: NEGATIVE — SIGNIFICANT CHANGE UP
GRAN CASTS # UR COMP ASSIST: 3 /LPF — SIGNIFICANT CHANGE UP
HCT VFR BLD CALC: 23.4 % — LOW (ref 34.5–45)
HGB BLD-MCNC: 7.4 G/DL — LOW (ref 11.5–15.5)
HYALINE CASTS # UR AUTO: 0 /LPF — SIGNIFICANT CHANGE UP (ref 0–7)
INR BLD: 0.97 RATIO — SIGNIFICANT CHANGE UP (ref 0.88–1.16)
KETONES UR-MCNC: NEGATIVE — SIGNIFICANT CHANGE UP
LEUKOCYTE ESTERASE UR-ACNC: ABNORMAL
MAGNESIUM SERPL-MCNC: 2.3 MG/DL — SIGNIFICANT CHANGE UP (ref 1.6–2.6)
MCHC RBC-ENTMCNC: 28.6 PG — SIGNIFICANT CHANGE UP (ref 27–34)
MCHC RBC-ENTMCNC: 31.6 GM/DL — LOW (ref 32–36)
MCV RBC AUTO: 90.3 FL — SIGNIFICANT CHANGE UP (ref 80–100)
NITRITE UR-MCNC: NEGATIVE — SIGNIFICANT CHANGE UP
NRBC # BLD: 0 /100 WBCS — SIGNIFICANT CHANGE UP (ref 0–0)
PH UR: 6 — SIGNIFICANT CHANGE UP (ref 5–8)
PHOSPHATE SERPL-MCNC: 4 MG/DL — SIGNIFICANT CHANGE UP (ref 2.5–4.5)
PLATELET # BLD AUTO: 320 K/UL — SIGNIFICANT CHANGE UP (ref 150–400)
POTASSIUM SERPL-MCNC: 3.4 MMOL/L — LOW (ref 3.5–5.3)
POTASSIUM SERPL-SCNC: 3.4 MMOL/L — LOW (ref 3.5–5.3)
PROT SERPL-MCNC: 6.3 G/DL — SIGNIFICANT CHANGE UP (ref 6–8.3)
PROT UR-MCNC: SIGNIFICANT CHANGE UP
PROTHROM AB SERPL-ACNC: 11.1 SEC — SIGNIFICANT CHANGE UP (ref 10.5–13.4)
RBC # BLD: 2.59 M/UL — LOW (ref 3.8–5.2)
RBC # FLD: 15.2 % — HIGH (ref 10.3–14.5)
RBC CASTS # UR COMP ASSIST: 1 /HPF — SIGNIFICANT CHANGE UP (ref 0–4)
SODIUM SERPL-SCNC: 133 MMOL/L — LOW (ref 135–145)
SP GR SPEC: 1.01 — SIGNIFICANT CHANGE UP (ref 1.01–1.02)
SPECIMEN SOURCE: SIGNIFICANT CHANGE UP
UROBILINOGEN FLD QL: NEGATIVE — SIGNIFICANT CHANGE UP
UUN UR-MCNC: 169 MG/DL — SIGNIFICANT CHANGE UP
WBC # BLD: 7.91 K/UL — SIGNIFICANT CHANGE UP (ref 3.8–10.5)
WBC # FLD AUTO: 7.91 K/UL — SIGNIFICANT CHANGE UP (ref 3.8–10.5)
WBC UR QL: 17 /HPF — HIGH (ref 0–5)

## 2022-07-21 PROCEDURE — 99233 SBSQ HOSP IP/OBS HIGH 50: CPT | Mod: GC

## 2022-07-21 RX ORDER — SODIUM CHLORIDE 9 MG/ML
1000 INJECTION, SOLUTION INTRAVENOUS
Refills: 0 | Status: DISCONTINUED | OUTPATIENT
Start: 2022-07-21 | End: 2022-07-21

## 2022-07-21 RX ORDER — METOCLOPRAMIDE HCL 10 MG
5 TABLET ORAL ONCE
Refills: 0 | Status: COMPLETED | OUTPATIENT
Start: 2022-07-21 | End: 2022-07-21

## 2022-07-21 RX ORDER — CEFTRIAXONE 500 MG/1
1000 INJECTION, POWDER, FOR SOLUTION INTRAMUSCULAR; INTRAVENOUS EVERY 24 HOURS
Refills: 0 | Status: COMPLETED | OUTPATIENT
Start: 2022-07-21 | End: 2022-07-23

## 2022-07-21 RX ORDER — SODIUM CHLORIDE 9 MG/ML
500 INJECTION, SOLUTION INTRAVENOUS ONCE
Refills: 0 | Status: COMPLETED | OUTPATIENT
Start: 2022-07-21 | End: 2022-07-21

## 2022-07-21 RX ADMIN — SODIUM CHLORIDE 80 MILLILITER(S): 9 INJECTION, SOLUTION INTRAVENOUS at 05:15

## 2022-07-21 RX ADMIN — SODIUM CHLORIDE 150 MILLILITER(S): 9 INJECTION, SOLUTION INTRAVENOUS at 12:56

## 2022-07-21 RX ADMIN — Medication 5 MILLIGRAM(S): at 22:14

## 2022-07-21 RX ADMIN — PANTOPRAZOLE SODIUM 40 MILLIGRAM(S): 20 TABLET, DELAYED RELEASE ORAL at 17:36

## 2022-07-21 RX ADMIN — CEFTRIAXONE 100 MILLIGRAM(S): 500 INJECTION, POWDER, FOR SOLUTION INTRAMUSCULAR; INTRAVENOUS at 17:36

## 2022-07-21 RX ADMIN — ATENOLOL 25 MILLIGRAM(S): 25 TABLET ORAL at 05:15

## 2022-07-21 RX ADMIN — PANTOPRAZOLE SODIUM 40 MILLIGRAM(S): 20 TABLET, DELAYED RELEASE ORAL at 05:16

## 2022-07-21 RX ADMIN — SODIUM CHLORIDE 500 MILLILITER(S): 9 INJECTION, SOLUTION INTRAVENOUS at 10:51

## 2022-07-21 NOTE — PROGRESS NOTE ADULT - PROBLEM SELECTOR PLAN 1
Pruritis, pale stools, jaundice, dark urine, intermittent RUQ pain  Poor appetite past few days, denies weight loss.   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   - hepatitis panel negative.   - BCx and UCx drawn for low suspicion of cholangitis, Pt is afebrile WBC wnl, but pt reported chills overnight.

## 2022-07-21 NOTE — PROGRESS NOTE ADULT - SUBJECTIVE AND OBJECTIVE BOX
Authored by Amairani Willis MD, PGY3  PATIENT:  SONJA WOOD  [id number]    CHIEF COMPLAINT:  Patient is a 89y old  Female who presents with a chief complaint of Itching, orange urine, pale stools, RUQ abdominal pain (2022 14:10)      INTERVAL HISTORY OVERNIGHT EVENTS: JULES overnight.         MEDICATIONS:  MEDICATIONS  (STANDING):  ATENolol  Tablet 25 milliGRAM(s) Oral daily  lactated ringers. 1000 milliLiter(s) (80 mL/Hr) IV Continuous <Continuous>  melatonin 3 milliGRAM(s) Oral at bedtime  pantoprazole  Injectable 40 milliGRAM(s) IV Push every 12 hours  sodium chloride 0.9%. 1000 milliLiter(s) (100 mL/Hr) IV Continuous <Continuous>    MEDICATIONS  (PRN):  zolpidem 5 milliGRAM(s) Oral at bedtime PRN Insomnia      ALLERGIES:  Allergies    penicillin (Unknown)  tetracaine (Unknown)    Intolerances        OBJECTIVE:  ICU Vital Signs Last 24 Hrs  T(C): 36.7 (2022 04:29), Max: 36.7 (2022 20:31)  T(F): 98 (2022 04:29), Max: 98.1 (2022 20:31)  HR: 58 (2022 04:29) (53 - 84)  BP: 125/56 (2022 04:29) (125/56 - 164/73)  BP(mean): --  ABP: --  ABP(mean): --  RR: 18 (2022 04:29) (18 - 20)  SpO2: 95% (2022 04:29) (94% - 96%)    O2 Parameters below as of 2022 04:29  Patient On (Oxygen Delivery Method): room air                Daily Height in cm: 162.6 (2022 07:14)    Daily     PHYSICAL EXAMINATION:  Constitutional: WDWN, NAD  HEENT: PERRL, EOMI, sclera non-icteric, neck supple, trachea midline, no masses, no JVD, MMM, good dentition  Respiratory: CTA b/l, good air entry b/l, no wheezing, no rhonchi, no rales, without accessory muscle use and no intercostal retractions  Cardiovascular: RRR, normal S1S2, no M/R/G  Gastrointestinal: soft, NTND, no masses palpable, BS normal  Extremities: Warm, well perfused, pulses equal bilateral upper and lower extremities, no edema, no clubbing. Capillary refill <2 sec  Neurological: AAOx3, CN Grossly intact  Skin: Normal temperature, warm, dry        LABS:                          7.8    8.59  )-----------( 289      ( 2022 06:58 )             25.1     07-20    131<L>  |  97  |  32<H>  ----------------------------<  108<H>  3.5   |  17<L>  |  2.81<H>    Ca    8.9      2022 06:58  Phos  3.2     -20  Mg     2.2     -20    TPro  6.5  /  Alb  3.3  /  TBili  4.2<H>  /  DBili  x   /  AST  207<H>  /  ALT  231<H>  /  AlkPhos  649<H>  20    LIVER FUNCTIONS - ( 2022 06:58 )  Alb: 3.3 g/dL / Pro: 6.5 g/dL / ALK PHOS: 649 U/L / ALT: 231 U/L / AST: 207 U/L / GGT: x           PT/INR - ( 2022 07:26 )   PT: 11.1 sec;   INR: 0.97 ratio         PTT - ( 2022 07:26 )  PTT:33.5 sec        Urinalysis Basic - ( 2022 21:45 )    Color: Dark Yellow / Appearance: Turbid / S.029 / pH: x  Gluc: x / Ketone: Negative  / Bili: Small / Urobili: Negative   Blood: x / Protein: 30 mg/dL / Nitrite: Negative   Leuk Esterase: Large / RBC: 6 /hpf /  /HPF   Sq Epi: x / Non Sq Epi: 9 /hpf / Bacteria: Negative        TELEMETRY:     EKG:     IMAGING:

## 2022-07-21 NOTE — PROGRESS NOTE ADULT - PROBLEM SELECTOR PLAN 2
- Creatinine up to 2.8 (7/20) this AM from admission level of 2.016  Previous creatinine of Cr 1.58 (2018) patient denies Hx CKD however.   Poor PO hydration and intake x 4 days,   - Received 1L bolus of NS this AM, on 100 ml/hr of NS for follow-up 1L of NS.   - F/u urine lytes  Of note has 5cm b/l renal cysts seen on CTAP.   - less likely contrast nephropathy due to <48 hour time window since receiving last contrast.   - avoid nephrotoxins  - hydrochlorothiazide 25 mg daily and losartan 100 mg daily currently held given CAMRON on CKD.

## 2022-07-21 NOTE — PROGRESS NOTE ADULT - PROBLEM SELECTOR PLAN 4
Patient cannot recall home med, pill once per day "takes most days." Reports home BPs usually 115-120s systolic.   Normotensive throughout hospital course.  - Restarted on home atenolol 25 mg daily,   hydrochlorothiazide 25 mg daily and losartan 100 mg daily currently held given CAMRON on CKD.

## 2022-07-22 ENCOUNTER — TRANSCRIPTION ENCOUNTER (OUTPATIENT)
Age: 87
End: 2022-07-22

## 2022-07-22 LAB
ALBUMIN SERPL ELPH-MCNC: 3.2 G/DL — LOW (ref 3.3–5)
ALP SERPL-CCNC: 638 U/L — HIGH (ref 40–120)
ALT FLD-CCNC: 158 U/L — HIGH (ref 10–45)
ANION GAP SERPL CALC-SCNC: 17 MMOL/L — SIGNIFICANT CHANGE UP (ref 5–17)
APTT BLD: 32.7 SEC — SIGNIFICANT CHANGE UP (ref 27.5–35.5)
AST SERPL-CCNC: 104 U/L — HIGH (ref 10–40)
BASE EXCESS BLDV CALC-SCNC: -7.5 MMOL/L — LOW (ref -2–2)
BILIRUB SERPL-MCNC: 5.1 MG/DL — HIGH (ref 0.2–1.2)
BUN SERPL-MCNC: 39 MG/DL — HIGH (ref 7–23)
CALCIUM SERPL-MCNC: 8.9 MG/DL — SIGNIFICANT CHANGE UP (ref 8.4–10.5)
CHLORIDE SERPL-SCNC: 99 MMOL/L — SIGNIFICANT CHANGE UP (ref 96–108)
CO2 BLDV-SCNC: 19 MMOL/L — LOW (ref 22–26)
CO2 SERPL-SCNC: 18 MMOL/L — LOW (ref 22–31)
CREAT SERPL-MCNC: 3.37 MG/DL — HIGH (ref 0.5–1.3)
EGFR: 13 ML/MIN/1.73M2 — LOW
GAS PNL BLDV: SIGNIFICANT CHANGE UP
GLUCOSE SERPL-MCNC: 138 MG/DL — HIGH (ref 70–99)
HCO3 BLDV-SCNC: 18 MMOL/L — LOW (ref 22–29)
HCT VFR BLD CALC: 25.9 % — LOW (ref 34.5–45)
HGB BLD-MCNC: 8 G/DL — LOW (ref 11.5–15.5)
INR BLD: 0.97 RATIO — SIGNIFICANT CHANGE UP (ref 0.88–1.16)
LACTATE BLDV-MCNC: 1.4 MMOL/L — SIGNIFICANT CHANGE UP (ref 0.7–2)
MCHC RBC-ENTMCNC: 29.2 PG — SIGNIFICANT CHANGE UP (ref 27–34)
MCHC RBC-ENTMCNC: 30.9 GM/DL — LOW (ref 32–36)
MCV RBC AUTO: 94.5 FL — SIGNIFICANT CHANGE UP (ref 80–100)
PCO2 BLDV: 37 MMHG — LOW (ref 39–42)
PH BLDV: 7.3 — LOW (ref 7.32–7.43)
PLATELET # BLD AUTO: 303 K/UL — SIGNIFICANT CHANGE UP (ref 150–400)
PO2 BLDV: 122 MMHG — HIGH (ref 25–45)
POTASSIUM SERPL-MCNC: 3.3 MMOL/L — LOW (ref 3.5–5.3)
POTASSIUM SERPL-SCNC: 3.3 MMOL/L — LOW (ref 3.5–5.3)
PROT SERPL-MCNC: 6.4 G/DL — SIGNIFICANT CHANGE UP (ref 6–8.3)
PROTHROM AB SERPL-ACNC: 11.2 SEC — SIGNIFICANT CHANGE UP (ref 10.5–13.4)
RBC # BLD: 2.74 M/UL — LOW (ref 3.8–5.2)
RBC # FLD: 15.3 % — HIGH (ref 10.3–14.5)
SAO2 % BLDV: 99.3 % — HIGH (ref 67–88)
SODIUM SERPL-SCNC: 134 MMOL/L — LOW (ref 135–145)
WBC # BLD: 9.02 K/UL — SIGNIFICANT CHANGE UP (ref 3.8–10.5)
WBC # FLD AUTO: 9.02 K/UL — SIGNIFICANT CHANGE UP (ref 3.8–10.5)

## 2022-07-22 PROCEDURE — 71045 X-RAY EXAM CHEST 1 VIEW: CPT | Mod: 26,76

## 2022-07-22 PROCEDURE — 99233 SBSQ HOSP IP/OBS HIGH 50: CPT | Mod: GC

## 2022-07-22 PROCEDURE — 99232 SBSQ HOSP IP/OBS MODERATE 35: CPT | Mod: GC

## 2022-07-22 PROCEDURE — 93306 TTE W/DOPPLER COMPLETE: CPT | Mod: 26

## 2022-07-22 RX ORDER — FUROSEMIDE 40 MG
20 TABLET ORAL ONCE
Refills: 0 | Status: COMPLETED | OUTPATIENT
Start: 2022-07-22 | End: 2022-07-22

## 2022-07-22 RX ADMIN — Medication 20 MILLIGRAM(S): at 11:04

## 2022-07-22 RX ADMIN — Medication 20 MILLIGRAM(S): at 02:38

## 2022-07-22 RX ADMIN — CEFTRIAXONE 100 MILLIGRAM(S): 500 INJECTION, POWDER, FOR SOLUTION INTRAMUSCULAR; INTRAVENOUS at 18:17

## 2022-07-22 RX ADMIN — PANTOPRAZOLE SODIUM 40 MILLIGRAM(S): 20 TABLET, DELAYED RELEASE ORAL at 18:17

## 2022-07-22 RX ADMIN — PANTOPRAZOLE SODIUM 40 MILLIGRAM(S): 20 TABLET, DELAYED RELEASE ORAL at 05:33

## 2022-07-22 RX ADMIN — Medication 20 MILLIGRAM(S): at 11:03

## 2022-07-22 RX ADMIN — ATENOLOL 25 MILLIGRAM(S): 25 TABLET ORAL at 05:33

## 2022-07-22 NOTE — DISCHARGE NOTE PROVIDER - HOSPITAL COURSE
Dx: Obstructive jaundice, anemia, CAMRON  89yof with HTN and HLD presented to outpatient doctor with 1 week of itching, orange urine, pale stools, and intermittent RUQ abdominal pain found to have cholestatic pattern transaminitis and CT AP concerning for biliary dilatation 2/2 obstruction, admitted for malignancy workup. Pt went for MRCP w/o contrast which demonstrated findings suspicious for a periampullary malignancy, with differential including cholangiocarcinoma, ampullary tumor, pancreatic adenocarcinoma, or duodenal carcinoma. Pt presented with CAMRON on CKD, with a creatinine on admission of 2.016, previous known creatinine of 1.58 in 2018, however pt denied history of CKD. Pt reported poor oral intake for the past 4 days prior to presentation in the emergency department.  Pt was restarted on atenolol 25 mg daily home medication but home Losartan and hydrochlotothiazide were held given CAMRON. CAMRON worsened throughout course with creatinine increasing to above 3, pt was started on fluids. On 7/21, pt was started on ceftriaxone for suspected UTI. Patient was planned for ERCP/EUS on 7/22 but overnight desatted to the 90s on RA while walking back from using the bathroom. Pt was placed on 2L NC and CXR demonstrated pulmonary edema. Pt was previously on 150 cc/hr of LR which was discontinued. Pt received IV Lasix 20 mg x 3 and went for Transthoracic Echocardiogram.            Dx: Obstructive jaundice, anemia, CAMRON    89F with PMH HTN and HLD presented to outpatient doctor with 1 week of itching, orange urine, pale stools, and intermittent RUQ abdominal pain found to have cholestatic pattern transaminitis and CT AP concerning for biliary dilatation 2/2 obstruction, admitted for malignancy workup. Pt went for MRCP w/o contrast which demonstrated findings suspicious for a periampullary malignancy, with differential including cholangiocarcinoma, ampullary tumor, pancreatic adenocarcinoma, or duodenal carcinoma. Pt presented with CAMRON on CKD, with a creatinine on admission of 2.016, previous known creatinine of 1.58 in 2018, however pt denied history of CKD. Pt reported poor oral intake for the past 4 days prior to presentation in the emergency department. Pt was restarted on atenolol 25 mg daily home medication but home Losartan and hydrochlorothiazide were held given CAMRON. CAMRON worsened throughout course with creatinine increasing to above 3, pt was started on fluids. On 7/21, pt was started on ceftriaxone for suspected UTI. Patient was planned for ERCP/EUS on 7/22 but overnight desatted to the 90s on RA while walking back from using the bathroom. Pt was placed on 2L NC and CXR demonstrated pulmonary edema. Pt was previously on 150 cc/hr of LR which was discontinued. Pt received IV Lasix 20 mg x 3 and went for Transthoracic Echocardiogram which was within normal limits. On 7/24 pt's hemoglobin was downtrending, and cardiac cleared patient for ERCP and transfuse 1U with 40IV lasix. Subcutaneous heparin was resumed and nephrology recommended renal US which demonstrated bilateral cysts. On 7/25, patient had EUS/ERCP with GI with ERCP demonstrating diffuse biliary ductal dilation with Ca 19-9 of 56. On 7/26, patient was weaned off NC and is satting mid 90s on RA and switched to an oral PPI. CT chest w/o contrast was done. Dx: Obstructive jaundice, anemia, CAMRON    89F with PMH HTN and HLD presented to outpatient doctor with 1 week of itching, orange urine, pale stools, and intermittent RUQ abdominal pain found to have cholestatic pattern transaminitis and CT AP concerning for biliary dilatation 2/2 obstruction, admitted for malignancy workup. Pt went for MRCP w/o contrast which demonstrated findings suspicious for a periampullary malignancy, with differential including cholangiocarcinoma, ampullary tumor, pancreatic adenocarcinoma, or duodenal carcinoma. Pt presented with CAMRON on CKD, with a creatinine on admission of 2.016, previous known creatinine of 1.58 in 2018, however pt denied history of CKD. Pt reported poor oral intake for the past 4 days prior to presentation in the emergency department. Pt was restarted on atenolol 25 mg daily home medication but home Losartan and hydrochlorothiazide were held given CAMRON. CAMRON worsened throughout course with creatinine increasing to above 3, pt was started on fluids. On 7/21, pt was started on ceftriaxone for suspected UTI. Patient was planned for ERCP/EUS on 7/22 but overnight desatted to the 90s on RA while walking back from using the bathroom. Pt was placed on 2L NC and CXR demonstrated pulmonary edema. Pt was previously on 150 cc/hr of LR which was discontinued. Pt received IV Lasix 20 mg x 3 and went for Transthoracic Echocardiogram which was within normal limits. On 7/24 pt's hemoglobin was downtrending, and cardiac cleared patient for ERCP and transfuse 1U with 40IV lasix. Subcutaneous heparin was resumed and nephrology recommended renal US which demonstrated bilateral cysts. On 7/25, patient had EUS/ERCP with GI with ERCP demonstrating diffuse biliary ductal dilation with Ca 19-9 of 56. On 7/26, patient was weaned off NC and is satting mid 90s on RA and switched to an oral PPI with T. bili starting to downtrend to 8.3. On 7/27 US of the lower extremities showed no DVT in either extremity, with chest CT w/o contrast negative for metastasis. 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 Dx: Obstructive jaundice, anemia, CAMRON c/f pancreatic cancer    89F with PMH HTN and HLD presented to outpatient doctor with 1 week of itching, orange urine, pale stools, and intermittent RUQ abdominal pain found to have cholestatic pattern transaminitis and CT AP concerning for biliary dilatation 2/2 obstruction, admitted for malignancy workup. Pt went for MRCP w/o contrast which demonstrated findings suspicious for a periampullary malignancy, with differential including cholangiocarcinoma, ampullary tumor, pancreatic adenocarcinoma, or duodenal carcinoma. Pt presented with CAMRON on CKD, with a creatinine on admission of 2.016, previous known creatinine of 1.58 in 2018, however pt denied history of CKD. Pt reported poor oral intake for the past 4 days prior to presentation in the emergency department. Pt was restarted on atenolol 25 mg daily home medication but home Losartan and hydrochlorothiazide were held given CAMRON. CAMRON worsened throughout course with creatinine increasing to above 3, pt was started on fluids. On 7/21, pt was started on ceftriaxone for suspected UTI. Patient was planned for ERCP/EUS on 7/22 but overnight desatted to the 90s on RA while walking back from using the bathroom. Pt was placed on 2L NC and CXR demonstrated pulmonary edema. Pt was previously on 150 cc/hr of LR which was discontinued. Pt received IV Lasix 20 mg x 3 and went for Transthoracic Echocardiogram which was within normal limits. On 7/24 pt's hemoglobin was downtrending, and cardiac cleared patient for ERCP and transfuse 1U with 40IV lasix. Subcutaneous heparin was resumed and nephrology recommended renal US which demonstrated bilateral cysts. On 7/25, patient had EUS/ERCP with GI with ERCP demonstrating diffuse biliary ductal dilation with Ca 19-9 of 56. On 7/26, patient was weaned off NC and is satting mid 90s on RA and switched to an oral PPI with T. bili starting to downtrend to 8.3, and then 4.9. On 7/27 US of the lower extremities showed no DVT in either extremity, with chest CT w/o contrast was negative for metastasis. 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     Oncology assessed patient and believes this may be localized disease curable with surgery, and/or palliatively treated with RT to the tumor in order to avoid obstruction and SBO. Patient was amenable to repeat biopsy outpatient in order to definitively diagnose the malignancy and discuss treatment options. Dx: Obstructive jaundice, anemia, CAMRON c/f pancreatic cancer    89F with PMH HTN and HLD presented to outpatient doctor with 1 week of itching, orange urine, pale stools, and intermittent RUQ abdominal pain found to have cholestatic pattern transaminitis and CT AP concerning for biliary dilatation 2/2 obstruction, admitted for malignancy workup. Pt went for MRCP w/o contrast which demonstrated findings suspicious for a periampullary malignancy, with differential including cholangiocarcinoma, ampullary tumor, pancreatic adenocarcinoma, or duodenal carcinoma. Pt presented with CAMRON on CKD, with a creatinine on admission of 2.016, previous known creatinine of 1.58 in 2018, however pt denied history of CKD. Pt reported poor oral intake for the past 4 days prior to presentation in the emergency department. Pt was restarted on atenolol 25 mg daily home medication but home Losartan and hydrochlorothiazide were held given CAMRON. CAMRON worsened throughout course with creatinine increasing to above 3, pt was started on fluids. On 7/21, pt was started on ceftriaxone for suspected UTI. Patient was planned for ERCP/EUS on 7/22 but overnight desatted to the 90s on RA while walking back from using the bathroom. Pt was placed on 2L NC and CXR demonstrated pulmonary edema. Pt was previously on 150 cc/hr of LR which was discontinued. Pt received IV Lasix 20 mg x 3 and went for Transthoracic Echocardiogram which was within normal limits. On 7/24 pt's hemoglobin was downtrending, and cardiac team cleared patient for ERCP and transfuse 1U with 40IV lasix. Subcutaneous heparin was resumed and nephrology recommended renal US which demonstrated bilateral cysts. On 7/25, patient had EUS/ERCP with GI with ERCP demonstrating diffuse biliary ductal dilation with Ca 19-9 of 56 with biliary stent placement. On 7/26, patient was weaned off NC and is satting mid 90s on RA and switched to an oral PPI with T. bili starting to downtrend to 8.3, and then 4.1. On 7/27 US of the lower extremities showed no DVT in either extremity, with chest CT w/o contrast was negative for metastasis. 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     Oncology assessed patient and believes this may be localized disease curable with surgery, and/or palliatively treated with RT to the tumor in order to avoid obstruction and SBO and recommended repeat biopsy of the mass. Patient was amenable to repeat biopsy outpatient in order to definitively diagnose the malignancy and discuss treatment options.  Patient requests to be dc as she has to care for her 2 children.  Patient will be dc home with services and will f/up with the recommended specialist Dx: Obstructive jaundice, anemia, CAMRON c/f pancreatic cancer    89F with PMH HTN and HLD presented to outpatient doctor with 1 week of itching, orange urine, pale stools, and intermittent RUQ abdominal pain found to have cholestatic pattern transaminitis and CT AP concerning for biliary dilatation 2/2 obstruction, admitted for malignancy workup. Pt went for MRCP w/o contrast which demonstrated findings suspicious for a periampullary malignancy, with differential including cholangiocarcinoma, ampullary tumor, pancreatic adenocarcinoma, or duodenal carcinoma. Pt presented with CAMRON on CKD, with a creatinine on admission of 2.016, previous known creatinine of 1.58 in 2018, however pt denied history of CKD. Pt reported poor oral intake for the past 4 days prior to presentation in the emergency department. Pt was restarted on atenolol 25 mg daily home medication but home Losartan and hydrochlorothiazide were held given CAMRON. CAMRON worsened throughout course with creatinine increasing to above 3, pt was started on fluids. On 7/21, pt was started on ceftriaxone for suspected UTI. Patient was planned for ERCP/EUS on 7/22 but overnight desatted to the 90s on RA while walking back from using the bathroom. Pt was placed on 2L NC and CXR demonstrated pulmonary edema. Pt was previously on 150 cc/hr of LR which was discontinued. Pt received IV Lasix 20 mg x 3 and went for Transthoracic Echocardiogram which was within normal limits. On 7/24 pt's hemoglobin was downtrending, and cardiac team cleared patient for ERCP and transfuse 1U with 40IV lasix. Subcutaneous heparin was resumed and nephrology recommended renal US which demonstrated bilateral cysts. On 7/25, patient had EUS/ERCP with GI with ERCP demonstrating diffuse biliary ductal dilation with Ca 19-9 of 56 with biliary stent placement. On 7/26, patient was weaned off NC and is satting mid 90s on RA and switched to an oral PPI with T. bili starting to downtrend to 8.3, and then 4.1. On 7/27 US of the lower extremities showed no DVT in either extremity, with chest CT w/o contrast was negative for metastasis. 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     Oncology assessed patient and believes this may be localized disease curable with surgery, and/or palliatively treated with RT to the tumor in order to avoid obstruction and SBO and recommended repeat biopsy of the mass. Patient was amenable to repeat biopsy outpatient in order to definitively diagnose the malignancy and discuss treatment options.  Patient requests to be dc as she has to care for her 2 children.  Patient will be dc home with services and will f/up with the recommended specialist.     Patient will be discharged with holding her home atenolol, losartan, HCTZ and lipitor. Recommend following up with her PCP outpatient to restart these medications.

## 2022-07-22 NOTE — DISCHARGE NOTE PROVIDER - CARE PROVIDERS DIRECT ADDRESSES
[email or web address]

## 2022-07-22 NOTE — PROGRESS NOTE ADULT - PROBLEM SELECTOR PLAN 4
Reports home BPs usually 115-120s systolic.   Normotensive throughout hospital course.  - Restarted on home atenolol 25 mg daily,   hydrochlorothiazide 25 mg daily and losartan 100 mg daily currently held given CAMRON on CKD.

## 2022-07-22 NOTE — DISCHARGE NOTE PROVIDER - NSDCFUADDAPPT_GEN_ALL_CORE_FT
You have an appointment with Dr. Kendrick (surgical oncology) on 8/4/22 at 1:30pm to discuss performing a biopsy of your mass. You have an appointment with Dr. Kendrick (surgical oncology) on 8/4/22 at 1:30pm to discuss performing a biopsy of your mass.     Please make an appointment with Dr. Washington within 1 week of hospital discharge.    Please make an appointment with an oncologist at [facility] within 1 week of hospital discharge. You have an appointment with Dr. Kendrick (surgical oncology) on 8/4/22 at 1:30pm to discuss performing a biopsy of your mass.     Please make an appointment with Dr. Washington within 1 week of hospital discharge. We are holding your home lipitor, HCTZ, atenolol and losartan. Please discuss restarting these with Dr. Washington.     Please make an appointment with an oncologist at [facility] within 1 week of hospital discharge.

## 2022-07-22 NOTE — DISCHARGE NOTE PROVIDER - NSDCCPTREATMENT_GEN_ALL_CORE_FT
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 The mass was biopsied. ERCP revealed diffuse biliary ductal dilation after placement of a 10 mm x 6 cm fully covered self expanding biliary stent.

## 2022-07-22 NOTE — DISCHARGE NOTE PROVIDER - CARE PROVIDER_API CALL
Cash Washington (MD)  Internal Medicine  [address] [facility] [facility], [address]  [place] [address]  Phone: [phone]  Fax: [phone]  Established Patient  Follow Up Time: 2 weeks   Cash Washington)  Internal Medicine  [address] [place] [address], [address]  [place] [address]  Phone: [phone]  Fax: [phone]  Established Patient  Follow Up Time: 2 weeks    Hai Kendrick)  Surgery  [address] [place] [address]  Phone: [phone]  Fax: [phone]  Established Patient  Scheduled Appointment: 08/04/2022 01:30 PM   Cash Washington)  Internal Medicine  [address] [place] [address], [address]  [place] [address]  Phone: [phone]  Fax: [phone]  Established Patient  Follow Up Time: 2 weeks    Hai Kendrick)  Surgery  [address] [place] [address]  Phone: [phone]  Fax: [phone]  Established Patient  Scheduled Appointment: 08/04/2022 01:30 PM    [facility],   [address] [place] [address]  Phone: [phone]  Fax: (   )    -  Follow Up Time:

## 2022-07-22 NOTE — PROGRESS NOTE ADULT - ASSESSMENT
88yo F with PMH of HTN and HLD who presents with pruritus x 1 week and RUQ pain, found to have biliary dilatation concerning for obstruction.    # Dyspnea - likely in the setting of hypervolemia  # Obstructive jaundice - DDx includes ampullary malignancy vs. stones/sludge vs. benign stricture. Patient is symptomatic with pruritus. No signs concerning for cholangitis. Would plan for EUS and ERCP and stent placement - however currently not optimized for a procedure given dyspnea and pulmonary edema  # Anemia - Normocytic. No overt blood loss.  # CAMRON - unclear baseline, likely CAMRON on CKD    Recommendations:  - TTE, cardiology consult  - EUS/ERCP pending optimization, likely early next week  - CAMRON w/u per primary team  - Trend CMP, CBC, coags    Please note that the recommendations are not final until attested by an attending.    Thank you for involving us in the care of this patient. Please reach out if any further questions.    Eleazar Mark, PGY-6  Gastroenterology/Hepatology Fellow    Available on [employer] Teams  After 5PM/Weekends, please contact the on-call GI fellow: [phone]

## 2022-07-22 NOTE — PROGRESS NOTE ADULT - SUBJECTIVE AND OBJECTIVE BOX
**************************************  Alex Brooke, PGY-1  After 7PM, please contact night float at [phone] or [phone]  **************************************    INTERVAL HPI/OVERNIGHT EVENTS:  Pt experienced SOB after walking (desatting to low 90s) and oxygen requirement was escalated from room air to 2L NC. Pt was on 150 ml/hr of LR which was discontinued, and CXR demonstrating pulmonary edema per preliminary read. Pt was given IV Lasix 20 mg and will be monitored.  No complaints at this time. Patient denies: fever, chills, dizziness, weakness, HA, Changes in vision, CP, palpitations, SOB, cough, N/V/D/C, dysuria, changes in bowel movements, LE edema. ROS otherwise negative.    VITAL SIGNS:  T(F): 98.7 (22 @ 04:29)  HR: 54 (22 @ 04:29)  BP: 148/64 (22 @ 04:29)  RR: 20 (22 @ 04:29)  SpO2: 99% (22 @ 04:29)  Wt(kg): --    PHYSICAL EXAM:    Constitutional: WDWN, NAD  HEENT: PERRL, EOMI, sclera non-icteric, neck supple, trachea midline, no masses, no JVD, MMM, good dentition  Respiratory: CTA b/l, good air entry b/l, no wheezing, no rhonchi, no rales, without accessory muscle use and no intercostal retractions  Cardiovascular: RRR, normal S1S2, no M/R/G  Gastrointestinal: soft, NTND, no masses palpable, BS normal  Extremities: Warm, well perfused, pulses equal bilateral upper and lower extremities, no edema, no clubbing. Capillary refill <2 sec  Neurological: AAOx3, CN Grossly intact  Skin: Normal temperature, warm, dry    MEDICATIONS  (STANDING):  ATENolol  Tablet 25 milliGRAM(s) Oral daily  cefTRIAXone   IVPB 1000 milliGRAM(s) IV Intermittent every 24 hours  melatonin 3 milliGRAM(s) Oral at bedtime  pantoprazole  Injectable 40 milliGRAM(s) IV Push every 12 hours    MEDICATIONS  (PRN):  zolpidem 5 milliGRAM(s) Oral at bedtime PRN Insomnia      Allergies    penicillin (Unknown)  tetracaine (Unknown)    Intolerances        LABS:                        8.0    9.02  )-----------( 303      ( 2022 00:29 )             25.9     07    134<L>  |  99  |  39<H>  ----------------------------<  138<H>  3.3<L>   |  18<L>  |  3.37<H>    Ca    8.9      2022 00:29  Phos  4.0       Mg     2.3         TPro  6.4  /  Alb  3.2<L>  /  TBili  5.1<H>  /  DBili  x   /  AST  104<H>  /  ALT  158<H>  /  AlkPhos  638<H>      PT/INR - ( 2022 00:29 )   PT: 11.2 sec;   INR: 0.97 ratio         PTT - ( 2022 00:29 )  PTT:32.7 sec  Urinalysis Basic - ( 2022 11:49 )    Color: Yellow / Appearance: Clear / S.014 / pH: x  Gluc: x / Ketone: Negative  / Bili: Small / Urobili: Negative   Blood: x / Protein: Trace / Nitrite: Negative   Leuk Esterase: Large / RBC: 1 /hpf / WBC 17 /HPF   Sq Epi: x / Non Sq Epi: 2 /hpf / Bacteria: Negative        RADIOLOGY & ADDITIONAL TESTS:  Reviewed

## 2022-07-22 NOTE — PROGRESS NOTE ADULT - PROBLEM SELECTOR PLAN 1
Pruritis, pale stools, jaundice, dark urine, intermittent RUQ pain  Poor appetite past few days, denies weight loss.   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   - hepatitis panel negative.   - BCx and UCx drawn for low suspicion of cholangitis, Pt is afebrile WBC wnl, but pt reported chills overnight. Pruritis, pale stools, jaundice, dark urine, intermittent RUQ pain  Poor appetite past few days, denies weight loss.   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   - hepatitis panel negative.   - BCx and UCx drawn for low suspicion of cholangitis, Pt is afebrile WBC wnl, but pt reported chills overnight.

## 2022-07-22 NOTE — DISCHARGE NOTE PROVIDER - PROVIDER TOKENS
PROVIDER:[TOKEN:[[id number]],FOLLOWUP:[2 weeks],ESTABLISHEDPATIENT:[T]] PROVIDER:[TOKEN:[[id number]],FOLLOWUP:[2 weeks],ESTABLISHEDPATIENT:[T]],PROVIDER:[[id number]],SCHEDULEDAPPT:[08/04/2022],SCHEDULEDAPPTTIME:[01:30 PM],ESTABLISHEDPATIENT:[T]] PROVIDER:[TOKEN:[[id number]],FOLLOWUP:[2 weeks],ESTABLISHEDPATIENT:[T]],PROVIDER:[[id number]],SCHEDULEDAPPT:[08/04/2022],SCHEDULEDAPPTTIME:[01:30 PM],ESTABLISHEDPATIENT:[T]],FREE:[LAST:[[facility]],PHONE:[phone]],FAX:[(   )    -],ADDRESS:[[address] [place] [address]]]

## 2022-07-22 NOTE — PROGRESS NOTE ADULT - SUBJECTIVE AND OBJECTIVE BOX
Interval Events:   -     Allergies:  penicillin (Unknown)  tetracaine (Unknown)        Hospital Medications:  ATENolol  Tablet 25 milliGRAM(s) Oral daily  cefTRIAXone   IVPB 1000 milliGRAM(s) IV Intermittent every 24 hours  melatonin 3 milliGRAM(s) Oral at bedtime  pantoprazole  Injectable 40 milliGRAM(s) IV Push every 12 hours  zolpidem 5 milliGRAM(s) Oral at bedtime PRN      PMHX/PSHX:  HLD (hyperlipidemia)    GERD (gastroesophageal reflux disease)    HTN (hypertension)    H/O:         Family history:  Family history of diabetes mellitus (DM) (Mother)    FH: liver cancer (Child)        ROS: As per HPI, otherwise 14-point ROS reviewed and negative.      PHYSICAL EXAM:   Vital Signs:  Vital Signs Last 24 Hrs  T(C): 37.1 (2022 04:29), Max: 37.1 (2022 04:29)  T(F): 98.7 (2022 04:29), Max: 98.7 (2022 04:29)  HR: 54 (2022 04:29) (53 - 60)  BP: 148/64 (2022 04:29) (134/54 - 148/64)  BP(mean): --  RR: 20 (2022 04:29) (18 - 20)  SpO2: 98% (2022 08:44) (90% - 99%)    Parameters below as of 2022 04:29  Patient On (Oxygen Delivery Method): nasal cannula      Daily     Daily       22 @ 07:01  -  22 @ 07:00  --------------------------------------------------------  IN: 1980 mL / OUT: 0 mL / NET: 1980 mL        GENERAL:  No acute distress  HEENT:  Normocephalic/atraumtic,  no scleral icterus  CHEST:  No accessory muscle use  HEART:  Regular rate and rhythm  ABDOMEN:  Soft, non-tender, non-distended, normoactive bowel sounds, no masses, no hepato-splenomegaly, no signs of chronic liver disease  EXTREMITIES:  No cyanosis, clubbing, or edema  SKIN:  No rash  NEURO:  Alert and oriented x 3, no asterixis, no tremor    LABS:                        8.0    9.02  )-----------( 303      ( 2022 00:29 )             25.9     Mean Cell Volume: 94.5 fl (22 @ 00:29)        134<L>  |  99  |  39<H>  ----------------------------<  138<H>  3.3<L>   |  18<L>  |  3.37<H>    Ca    8.9      2022 00:29  Phos  4.0       Mg     2.3         TPro  6.4  /  Alb  3.2<L>  /  TBili  5.1<H>  /  DBili  x   /  AST  104<H>  /  ALT  158<H>  /  AlkPhos  638<H>      LIVER FUNCTIONS - ( 2022 00:29 )  Alb: 3.2 g/dL / Pro: 6.4 g/dL / ALK PHOS: 638 U/L / ALT: 158 U/L / AST: 104 U/L / GGT: x           PT/INR - ( 2022 00:29 )   PT: 11.2 sec;   INR: 0.97 ratio         PTT - ( 2022 00:29 )  PTT:32.7 sec  Urinalysis Basic - ( 2022 11:49 )    Color: Yellow / Appearance: Clear / S.014 / pH: x  Gluc: x / Ketone: Negative  / Bili: Small / Urobili: Negative   Blood: x / Protein: Trace / Nitrite: Negative   Leuk Esterase: Large / RBC: 1 /hpf / WBC 17 /HPF   Sq Epi: x / Non Sq Epi: 2 /hpf / Bacteria: Negative                          8.0    9.02  )-----------( 303      ( 2022 00:29 )             25.9                         7.4    7.91  )-----------( 320      ( 2022 12:02 )             23.4                         7.8    8.59  )-----------( 289      ( 2022 06:58 )             25.1                         7.9    9.10  )-----------( 317       16:23 )             26.2       Imaging:           Interval Events:   - hypoxic overnight to 90% RA, CXR with pulmonary edema, improved after lasix x 20mg  - This AM still dyspneic but improved    Allergies:  penicillin (Unknown)  tetracaine (Unknown)      Hospital Medications:  ATENolol  Tablet 25 milliGRAM(s) Oral daily  cefTRIAXone   IVPB 1000 milliGRAM(s) IV Intermittent every 24 hours  melatonin 3 milliGRAM(s) Oral at bedtime  pantoprazole  Injectable 40 milliGRAM(s) IV Push every 12 hours  zolpidem 5 milliGRAM(s) Oral at bedtime PRN      PMHX/PSHX:  HLD (hyperlipidemia)    GERD (gastroesophageal reflux disease)    HTN (hypertension)    H/O:         Family history:  Family history of diabetes mellitus (DM) (Mother)    FH: liver cancer (Child)        ROS: As per HPI, otherwise 14-point ROS reviewed and negative.      PHYSICAL EXAM:   Vital Signs:  Vital Signs Last 24 Hrs  T(C): 37.1 (2022 04:29), Max: 37.1 (2022 04:29)  T(F): 98.7 (2022 04:29), Max: 98.7 (2022 04:29)  HR: 54 (2022 04:29) (53 - 60)  BP: 148/64 (2022 04:29) (134/54 - 148/64)  BP(mean): --  RR: 20 (2022 04:29) (18 - 20)  SpO2: 98% (2022 08:44) (90% - 99%)    Parameters below as of 2022 04:29  Patient On (Oxygen Delivery Method): nasal cannula      Daily     Daily       22 @ 07:01  -  22 @ 07:00  --------------------------------------------------------  IN: 1980 mL / OUT: 0 mL / NET: 1980 mL      GENERAL:  Tachypneic, 2L NC  HEENT:  Normocephalic/atraumatic, +scleral icterus  CHEST:  No accessory muscle use  HEART:  Regular rate and rhythm  ABDOMEN:  Soft, non-tender, non-distended  EXTREMITIES: No cyanosis, clubbing, or edema  SKIN:  No rash  NEURO:  Alert and oriented x 3, no asterixis      LABS:                        8.0    9.02  )-----------( 303      ( 2022 00:29 )             25.9     Mean Cell Volume: 94.5 fl (- @ 00:29)        134<L>  |  99  |  39<H>  ----------------------------<  138<H>  3.3<L>   |  18<L>  |  3.37<H>    Ca    8.9      2022 00:29  Phos  4.0       Mg     2.3         TPro  6.4  /  Alb  3.2<L>  /  TBili  5.1<H>  /  DBili  x   /  AST  104<H>  /  ALT  158<H>  /  AlkPhos  638<H>      LIVER FUNCTIONS - ( 2022 00:29 )  Alb: 3.2 g/dL / Pro: 6.4 g/dL / ALK PHOS: 638 U/L / ALT: 158 U/L / AST: 104 U/L / GGT: x           PT/INR - ( 2022 00:29 )   PT: 11.2 sec;   INR: 0.97 ratio         PTT - ( 2022 00:29 )  PTT:32.7 sec  Urinalysis Basic - ( 2022 11:49 )    Color: Yellow / Appearance: Clear / S.014 / pH: x  Gluc: x / Ketone: Negative  / Bili: Small / Urobili: Negative   Blood: x / Protein: Trace / Nitrite: Negative   Leuk Esterase: Large / RBC: 1 /hpf / WBC 17 /HPF   Sq Epi: x / Non Sq Epi: 2 /hpf / Bacteria: Negative                          8.0    9.02  )-----------( 303      ( 2022 00:29 )             25.9                         7.4    7.91  )-----------( 320      ( 2022 12:02 )             23.4                         7.8    8.59  )-----------( 289      ( 2022 06:58 )             25.1                         7.9    9.10  )-----------( 317      ( 2022 16:23 )             26.2       Imaging:

## 2022-07-22 NOTE — DISCHARGE NOTE PROVIDER - NSDCMRMEDTOKEN_GEN_ALL_CORE_FT
atenolol 25 mg oral tablet: 1 tab(s) orally once a day  hydroCHLOROthiazide 25 mg oral tablet: 1 tab(s) orally once a day  Lipitor 20 mg oral tablet: 1 tab(s) orally once a day  losartan 100 mg oral tablet: 1 tab(s) orally once a day  Melatonin 5 mg oral tablet: 1 tab(s) orally once a day (at bedtime)  omeprazole 20 mg oral delayed release capsule: 1 cap(s) orally once a day  zolpidem 5 mg oral tablet: 1 tab(s) orally once a day (at bedtime)   atenolol 25 mg oral tablet: 1 tab(s) orally once a day  cholestyramine 4 g/9 g oral powder for reconstitution: 4 gram(s) orally 4 times a day PRN for pruritis  hydroCHLOROthiazide 25 mg oral tablet: 1 tab(s) orally once a day  Lipitor 20 mg oral tablet: 1 tab(s) orally once a day  losartan 100 mg oral tablet: 1 tab(s) orally once a day  Melatonin 5 mg oral tablet: 1 tab(s) orally once a day (at bedtime)  omeprazole 20 mg oral delayed release capsule: 1 cap(s) orally once a day  simethicone 80 mg oral tablet, chewable: 1 tab(s) orally 4 times a day  zolpidem 5 mg oral tablet: 1 tab(s) orally once a day (at bedtime)   3 in 1 commode: 3 in 1 commode (ICD-10 )  atenolol 25 mg oral tablet: 1 tab(s) orally once a day  cholestyramine 4 g/9 g oral powder for reconstitution: 4 gram(s) orally 4 times a day PRN for pruritis  hydroCHLOROthiazide 25 mg oral tablet: 1 tab(s) orally once a day  Lipitor 20 mg oral tablet: 1 tab(s) orally once a day  losartan 100 mg oral tablet: 1 tab(s) orally once a day  Melatonin 5 mg oral tablet: 1 tab(s) orally once a day (at bedtime)  omeprazole 20 mg oral delayed release capsule: 1 cap(s) orally once a day  rolling walker: rolling walker for mobility (ICD-10- R26.2)  simethicone 80 mg oral tablet, chewable: 1 tab(s) orally 4 times a day  zolpidem 5 mg oral tablet: 1 tab(s) orally once a day (at bedtime)   cholestyramine 4 g/9 g oral powder for reconstitution: 4 gram(s) orally 4 times a day PRN for pruritis  Melatonin 5 mg oral tablet: 1 tab(s) orally once a day (at bedtime)  omeprazole 20 mg oral delayed release capsule: 1 cap(s) orally once a day  simethicone 80 mg oral tablet, chewable: 1 tab(s) orally 4 times a day  zolpidem 5 mg oral tablet: 1 tab(s) orally once a day (at bedtime)

## 2022-07-22 NOTE — DISCHARGE NOTE PROVIDER - NSDCFUSCHEDAPPT_GEN_ALL_CORE_FT
Sudheer Castro  [facility]ician [facility]  NEPHRO [address]  Scheduled Appointment: 08/04/2022     Hai Kendrick  [facility]  SURGONC [address] [facility]  Scheduled Appointment: 08/04/2022

## 2022-07-22 NOTE — DISCHARGE NOTE PROVIDER - NSDCCPCAREPLAN_GEN_ALL_CORE_FT
PRINCIPAL DISCHARGE DIAGNOSIS  Diagnosis: Obstructive jaundice  Assessment and Plan of Treatment: The itching, changes to urine, and changes to stool you had been experiencing are because of a blockage in your biliary system. You had imaging that showed a mass. In order to      SECONDARY DISCHARGE DIAGNOSES  Diagnosis: Acute kidney injury superimposed on chronic kidney disease  Assessment and Plan of Treatment:      PRINCIPAL DISCHARGE DIAGNOSIS  Diagnosis: Obstructive jaundice  Assessment and Plan of Treatment: The itching, changes to urine, and changes to stool you had been experiencing are because of a blockage in your biliary system. You had imaging that showed a mass. In order to      SECONDARY DISCHARGE DIAGNOSES  Diagnosis: Acute kidney injury superimposed on chronic kidney disease  Assessment and Plan of Treatment: Before coming to [facility], you were eating and drinking less for the past few days. This caused you to be dehydrated and resulted in some damage being done to the kidneys. While you were in the hospital, we gave you fluids and encouraged you to eat and drink which helped your dehydration. We monitored markers in the blood to track your kidney improvement throughout your hospitalization. Please try to eat well and stay hydrated, this is important for your kidney health. If you have changes to the amount or consistency of urine you produce, please contact your primary care physician.     PRINCIPAL DISCHARGE DIAGNOSIS  Diagnosis: Obstructive jaundice  Assessment and Plan of Treatment: The itching, changes to urine, and changes to stool you had been experiencing are because of a blockage in your biliary system. You had imaging that showed a mass. In order to improve biliary flow, you had a stent placed in your biliary ducts. Your bilirubin improved in the hospital. There was concern for malignancy, and a biopsy inpatient was inconclusive. We recommend repeating the biopsy outpatient with surgery (Dr. Hai Kendrick) and then once a diagnosis is confirmed following up with oncology and surgical oncology for a management plan. If you become sick at home and more jaundiced, please come back to the hospital. In the hospital, a Whipple was potentially available as was radiation therapy to shrink the tumor.      SECONDARY DISCHARGE DIAGNOSES  Diagnosis: Acute kidney injury superimposed on chronic kidney disease  Assessment and Plan of Treatment: Before coming to [facility], you were eating and drinking less for the past few days. This caused you to be dehydrated and resulted in some damage being done to the kidneys. While you were in the hospital, we gave you fluids and encouraged you to eat and drink which helped your dehydration. We monitored markers in the blood to track your kidney improvement throughout your hospitalization. Please try to eat well and stay hydrated, this is important for your kidney health. If you have changes to the amount or consistency of urine you produce, please contact your primary care physician. If you feel terribly sick and have urinary symptoms, please return to the hospital.

## 2022-07-23 LAB
ALBUMIN SERPL ELPH-MCNC: 3.5 G/DL — SIGNIFICANT CHANGE UP (ref 3.3–5)
ALP SERPL-CCNC: 714 U/L — HIGH (ref 40–120)
ALT FLD-CCNC: 151 U/L — HIGH (ref 10–45)
ANION GAP SERPL CALC-SCNC: 15 MMOL/L — SIGNIFICANT CHANGE UP (ref 5–17)
ANION GAP SERPL CALC-SCNC: 22 MMOL/L — HIGH (ref 5–17)
APPEARANCE UR: CLEAR — SIGNIFICANT CHANGE UP
AST SERPL-CCNC: 103 U/L — HIGH (ref 10–40)
BACTERIA # UR AUTO: NEGATIVE — SIGNIFICANT CHANGE UP
BILIRUB SERPL-MCNC: 6.6 MG/DL — HIGH (ref 0.2–1.2)
BILIRUB UR-MCNC: ABNORMAL
BUN SERPL-MCNC: 42 MG/DL — HIGH (ref 7–23)
BUN SERPL-MCNC: 45 MG/DL — HIGH (ref 7–23)
CALCIUM SERPL-MCNC: 9 MG/DL — SIGNIFICANT CHANGE UP (ref 8.4–10.5)
CALCIUM SERPL-MCNC: 9.6 MG/DL — SIGNIFICANT CHANGE UP (ref 8.4–10.5)
CHLORIDE SERPL-SCNC: 101 MMOL/L — SIGNIFICANT CHANGE UP (ref 96–108)
CHLORIDE SERPL-SCNC: 98 MMOL/L — SIGNIFICANT CHANGE UP (ref 96–108)
CO2 SERPL-SCNC: 21 MMOL/L — LOW (ref 22–31)
CO2 SERPL-SCNC: 21 MMOL/L — LOW (ref 22–31)
COLOR SPEC: YELLOW — SIGNIFICANT CHANGE UP
CREAT ?TM UR-MCNC: 74 MG/DL — SIGNIFICANT CHANGE UP
CREAT ?TM UR-MCNC: 75 MG/DL — SIGNIFICANT CHANGE UP
CREAT SERPL-MCNC: 2.92 MG/DL — HIGH (ref 0.5–1.3)
CREAT SERPL-MCNC: 3.27 MG/DL — HIGH (ref 0.5–1.3)
DIFF PNL FLD: ABNORMAL
EGFR: 13 ML/MIN/1.73M2 — LOW
EGFR: 15 ML/MIN/1.73M2 — LOW
EPI CELLS # UR: 1 /HPF — SIGNIFICANT CHANGE UP
GLUCOSE SERPL-MCNC: 143 MG/DL — HIGH (ref 70–99)
GLUCOSE SERPL-MCNC: 162 MG/DL — HIGH (ref 70–99)
GLUCOSE UR QL: NEGATIVE — SIGNIFICANT CHANGE UP
HCT VFR BLD CALC: 23.2 % — LOW (ref 34.5–45)
HGB BLD-MCNC: 7.5 G/DL — LOW (ref 11.5–15.5)
HYALINE CASTS # UR AUTO: 2 /LPF — SIGNIFICANT CHANGE UP (ref 0–2)
KETONES UR-MCNC: NEGATIVE — SIGNIFICANT CHANGE UP
LEUKOCYTE ESTERASE UR-ACNC: NEGATIVE — SIGNIFICANT CHANGE UP
MAGNESIUM SERPL-MCNC: 1.9 MG/DL — SIGNIFICANT CHANGE UP (ref 1.6–2.6)
MCHC RBC-ENTMCNC: 28.8 PG — SIGNIFICANT CHANGE UP (ref 27–34)
MCHC RBC-ENTMCNC: 32.3 GM/DL — SIGNIFICANT CHANGE UP (ref 32–36)
MCV RBC AUTO: 89.2 FL — SIGNIFICANT CHANGE UP (ref 80–100)
NITRITE UR-MCNC: NEGATIVE — SIGNIFICANT CHANGE UP
NRBC # BLD: 0 /100 WBCS — SIGNIFICANT CHANGE UP (ref 0–0)
PH UR: 5 — SIGNIFICANT CHANGE UP (ref 5–8)
PHOSPHATE SERPL-MCNC: 2.2 MG/DL — LOW (ref 2.5–4.5)
PLATELET # BLD AUTO: 383 K/UL — SIGNIFICANT CHANGE UP (ref 150–400)
POTASSIUM SERPL-MCNC: 2.7 MMOL/L — CRITICAL LOW (ref 3.5–5.3)
POTASSIUM SERPL-MCNC: 3.9 MMOL/L — SIGNIFICANT CHANGE UP (ref 3.5–5.3)
POTASSIUM SERPL-SCNC: 2.7 MMOL/L — CRITICAL LOW (ref 3.5–5.3)
POTASSIUM SERPL-SCNC: 3.9 MMOL/L — SIGNIFICANT CHANGE UP (ref 3.5–5.3)
POTASSIUM UR-SCNC: 37 MMOL/L — SIGNIFICANT CHANGE UP
PROT ?TM UR-MCNC: 17 MG/DL — HIGH (ref 0–12)
PROT SERPL-MCNC: 7 G/DL — SIGNIFICANT CHANGE UP (ref 6–8.3)
PROT UR-MCNC: SIGNIFICANT CHANGE UP
PROT/CREAT UR-RTO: 0.2 RATIO — SIGNIFICANT CHANGE UP (ref 0–0.2)
RBC # BLD: 2.6 M/UL — LOW (ref 3.8–5.2)
RBC # FLD: 15.7 % — HIGH (ref 10.3–14.5)
RBC CASTS # UR COMP ASSIST: 6 /HPF — HIGH (ref 0–4)
SODIUM SERPL-SCNC: 137 MMOL/L — SIGNIFICANT CHANGE UP (ref 135–145)
SODIUM SERPL-SCNC: 141 MMOL/L — SIGNIFICANT CHANGE UP (ref 135–145)
SODIUM UR-SCNC: 57 MMOL/L — SIGNIFICANT CHANGE UP
SP GR SPEC: 1.01 — SIGNIFICANT CHANGE UP (ref 1.01–1.02)
UROBILINOGEN FLD QL: NEGATIVE — SIGNIFICANT CHANGE UP
UUN UR-MCNC: 378 MG/DL — SIGNIFICANT CHANGE UP
WBC # BLD: 10.93 K/UL — HIGH (ref 3.8–10.5)
WBC # FLD AUTO: 10.93 K/UL — HIGH (ref 3.8–10.5)
WBC UR QL: 3 /HPF — SIGNIFICANT CHANGE UP (ref 0–5)

## 2022-07-23 PROCEDURE — 99232 SBSQ HOSP IP/OBS MODERATE 35: CPT

## 2022-07-23 RX ORDER — POTASSIUM CHLORIDE 20 MEQ
40 PACKET (EA) ORAL ONCE
Refills: 0 | Status: COMPLETED | OUTPATIENT
Start: 2022-07-23 | End: 2022-07-23

## 2022-07-23 RX ORDER — SIMETHICONE 80 MG/1
240 TABLET, CHEWABLE ORAL ONCE
Refills: 0 | Status: COMPLETED | OUTPATIENT
Start: 2022-07-23 | End: 2022-07-23

## 2022-07-23 RX ORDER — POTASSIUM CHLORIDE 20 MEQ
40 PACKET (EA) ORAL EVERY 4 HOURS
Refills: 0 | Status: COMPLETED | OUTPATIENT
Start: 2022-07-23 | End: 2022-07-23

## 2022-07-23 RX ORDER — SENNA PLUS 8.6 MG/1
2 TABLET ORAL AT BEDTIME
Refills: 0 | Status: DISCONTINUED | OUTPATIENT
Start: 2022-07-23 | End: 2022-07-28

## 2022-07-23 RX ORDER — SIMETHICONE 80 MG/1
80 TABLET, CHEWABLE ORAL
Refills: 0 | Status: DISCONTINUED | OUTPATIENT
Start: 2022-07-23 | End: 2022-07-28

## 2022-07-23 RX ADMIN — Medication 40 MILLIEQUIVALENT(S): at 13:49

## 2022-07-23 RX ADMIN — CEFTRIAXONE 100 MILLIGRAM(S): 500 INJECTION, POWDER, FOR SOLUTION INTRAMUSCULAR; INTRAVENOUS at 17:26

## 2022-07-23 RX ADMIN — Medication 40 MILLIEQUIVALENT(S): at 10:16

## 2022-07-23 RX ADMIN — SIMETHICONE 80 MILLIGRAM(S): 80 TABLET, CHEWABLE ORAL at 17:24

## 2022-07-23 RX ADMIN — ATENOLOL 25 MILLIGRAM(S): 25 TABLET ORAL at 05:29

## 2022-07-23 RX ADMIN — Medication 40 MILLIEQUIVALENT(S): at 09:13

## 2022-07-23 RX ADMIN — SIMETHICONE 80 MILLIGRAM(S): 80 TABLET, CHEWABLE ORAL at 23:02

## 2022-07-23 RX ADMIN — SIMETHICONE 240 MILLIGRAM(S): 80 TABLET, CHEWABLE ORAL at 17:24

## 2022-07-23 RX ADMIN — PANTOPRAZOLE SODIUM 40 MILLIGRAM(S): 20 TABLET, DELAYED RELEASE ORAL at 17:30

## 2022-07-23 RX ADMIN — Medication 3 MILLIGRAM(S): at 21:43

## 2022-07-23 RX ADMIN — SENNA PLUS 2 TABLET(S): 8.6 TABLET ORAL at 21:43

## 2022-07-23 RX ADMIN — PANTOPRAZOLE SODIUM 40 MILLIGRAM(S): 20 TABLET, DELAYED RELEASE ORAL at 05:29

## 2022-07-23 NOTE — PROGRESS NOTE ADULT - SUBJECTIVE AND OBJECTIVE BOX
**************************************  Alex Brooke, PGY-1  After 7PM, please contact night float at [phone] or [phone]  **************************************    INTERVAL HPI/OVERNIGHT EVENTS:  Patient was seen and examined at bedside. As per nurse and patient, no o/n events, patient resting comfortably. No complaints at this time. Patient denies: fever, chills, dizziness, weakness, HA, Changes in vision, CP, palpitations, SOB, cough, N/V/D/C, dysuria, changes in bowel movements, LE edema. ROS otherwise negative.    VITAL SIGNS:  T(F): 98.4 (22 @ 05:18)  HR: 53 (22 @ 05:18)  BP: 149/67 (22 @ 05:18)  RR: 18 (22 @ 05:18)  SpO2: 94% (22 @ 05:18)  Wt(kg): --    PHYSICAL EXAM:    Constitutional: WDWN, NAD  HEENT: PERRL, EOMI, sclera non-icteric, neck supple, trachea midline, no masses, no JVD, MMM, good dentition  Respiratory: CTA b/l, good air entry b/l, no wheezing, no rhonchi, no rales, without accessory muscle use and no intercostal retractions  Cardiovascular: RRR, normal S1S2, no M/R/G  Gastrointestinal: soft, NTND, no masses palpable, BS normal  Extremities: Warm, well perfused, pulses equal bilateral upper and lower extremities, no edema, no clubbing. Capillary refill <2 sec  Neurological: AAOx3, CN Grossly intact  Skin: Normal temperature, warm, dry    MEDICATIONS  (STANDING):  ATENolol  Tablet 25 milliGRAM(s) Oral daily  cefTRIAXone   IVPB 1000 milliGRAM(s) IV Intermittent every 24 hours  melatonin 3 milliGRAM(s) Oral at bedtime  pantoprazole  Injectable 40 milliGRAM(s) IV Push every 12 hours    MEDICATIONS  (PRN):  zolpidem 5 milliGRAM(s) Oral at bedtime PRN Insomnia      Allergies    penicillin (Unknown)  tetracaine (Unknown)    Intolerances        LABS:                        8.0    9.02  )-----------( 303      ( 2022 00:29 )             25.9     07-    134<L>  |  99  |  39<H>  ----------------------------<  138<H>  3.3<L>   |  18<L>  |  3.37<H>    Ca    8.9      2022 00:29  Phos  4.0     07  Mg     2.3         TPro  6.4  /  Alb  3.2<L>  /  TBili  5.1<H>  /  DBili  x   /  AST  104<H>  /  ALT  158<H>  /  AlkPhos  638<H>      PT/INR - ( 2022 00:29 )   PT: 11.2 sec;   INR: 0.97 ratio         PTT - ( 2022 00:29 )  PTT:32.7 sec  Urinalysis Basic - ( 2022 11:49 )    Color: Yellow / Appearance: Clear / S.014 / pH: x  Gluc: x / Ketone: Negative  / Bili: Small / Urobili: Negative   Blood: x / Protein: Trace / Nitrite: Negative   Leuk Esterase: Large / RBC: 1 /hpf / WBC 17 /HPF   Sq Epi: x / Non Sq Epi: 2 /hpf / Bacteria: Negative        RADIOLOGY & ADDITIONAL TESTS:  Reviewed **************************************  Alex Brooke, PGY-1  After 7PM, please contact night float at [phone] or [phone]  **************************************    INTERVAL HPI/OVERNIGHT EVENTS:  Patient was seen and examined at bedside. As per nurse and patient, no o/n events, patient resting comfortably. No complaints at this time. Patient denies: chest pain, shortness of breath, or abdominal pain   VITAL SIGNS:  T(F): 98.4 (22 @ 05:18)  HR: 53 (22 @ 05:18)  BP: 149/67 (22 @ 05:18)  RR: 18 (22 @ 05:18)  SpO2: 94% (22 @ 05:18)  Wt(kg): --    PHYSICAL EXAM:    Constitutional: NAD, obese   SKIN: jaundiced skin, no rash   Respiratory: CTA b/l, good air entry b/l, no wheezing, no crackles without accessory muscle use and no intercostal retractions  Cardiovascular: RRR, normal S1S2, no M/R/G  Gastrointestinal: soft, NTND,  BS normal  Extremities: Warm, no edema  Neurological: AAOx3,moves 4 extremities spontaneously    MEDICATIONS  (STANDING):  ATENolol  Tablet 25 milliGRAM(s) Oral daily  cefTRIAXone   IVPB 1000 milliGRAM(s) IV Intermittent every 24 hours  melatonin 3 milliGRAM(s) Oral at bedtime  pantoprazole  Injectable 40 milliGRAM(s) IV Push every 12 hours    MEDICATIONS  (PRN):  zolpidem 5 milliGRAM(s) Oral at bedtime PRN Insomnia      Allergies    penicillin (Unknown)  tetracaine (Unknown)    Intolerances        LABS:                        8.0    9.02  )-----------( 303      ( 2022 00:29 )             25.9     07-    134<L>  |  99  |  39<H>  ----------------------------<  138<H>  3.3<L>   |  18<L>  |  3.37<H>    Ca    8.9      2022 00:29  Phos  4.0       Mg     2.3         TPro  6.4  /  Alb  3.2<L>  /  TBili  5.1<H>  /  DBili  x   /  AST  104<H>  /  ALT  158<H>  /  AlkPhos  638<H>      PT/INR - ( 2022 00:29 )   PT: 11.2 sec;   INR: 0.97 ratio         PTT - ( 2022 00:29 )  PTT:32.7 sec  Urinalysis Basic - ( 2022 11:49 )    Color: Yellow / Appearance: Clear / S.014 / pH: x  Gluc: x / Ketone: Negative  / Bili: Small / Urobili: Negative   Blood: x / Protein: Trace / Nitrite: Negative   Leuk Esterase: Large / RBC: 1 /hpf / WBC 17 /HPF   Sq Epi: x / Non Sq Epi: 2 /hpf / Bacteria: Negative        RADIOLOGY & ADDITIONAL TESTS:  < from: Transthoracic Echocardiogram (22 @ 12:10) >  Observations:  Mitral Valve: Mitral annular calcification. Mild mitral  regurgitation.  Aortic Valve/Aorta: Calcified aortic valve with normal  opening. Mild aortic regurgitation.  Normal aortic root size.  Left Atrium: Moderately dilated left atrium.  Left Ventricle: Normal left ventricular internal dimensions  and wall thickness.  Normal left ventricular systolic function. No segmental  wall motion abnormalities.  Left ventricular filling pressure is elevated.  Right Heart: Normal right atrium. Normal right ventricular  size and function.  Normal tricuspid valve. Minimal tricuspid regurgitation.  Normal pulmonic valve. Mild pulmonic regurgitation.  Pericardium/Pleura: Normal pericardium with no pericardial  effusion.  Hemodynamic: Estimated right atrial pressure is normal.  Mild-moderate pulmonary hypertension. Estimated PASP 50  mmHg.  ------------------------------------------------------------------------  Conclusions:  Normal left ventricular systolic function. No segmental  wall motion abnormalities.  Mild-moderate pulmonary hypertension.    < end of copied text >

## 2022-07-23 NOTE — PROGRESS NOTE ADULT - PROBLEM SELECTOR PLAN 1
Pruritis, pale stools, jaundice, dark urine, intermittent RUQ pain  Poor appetite past few days, denies weight loss.   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   - hepatitis panel negative.   - BCx and UCx drawn for low suspicion of cholangitis, Pt is afebrile WBC wnl, but pt reported chills overnight. Pruritis, pale stools, jaundice, dark urine, intermittent RUQ pain  Poor appetite past few days, denies weight loss.   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   - BCx 7/19 NGTD, UCx contaminated

## 2022-07-24 LAB
ALBUMIN SERPL ELPH-MCNC: 3.3 G/DL — SIGNIFICANT CHANGE UP (ref 3.3–5)
ALP SERPL-CCNC: 665 U/L — HIGH (ref 40–120)
ALT FLD-CCNC: 139 U/L — HIGH (ref 10–45)
ANION GAP SERPL CALC-SCNC: 15 MMOL/L — SIGNIFICANT CHANGE UP (ref 5–17)
AST SERPL-CCNC: 108 U/L — HIGH (ref 10–40)
BASOPHILS # BLD AUTO: 0.01 K/UL — SIGNIFICANT CHANGE UP (ref 0–0.2)
BASOPHILS NFR BLD AUTO: 0.1 % — SIGNIFICANT CHANGE UP (ref 0–2)
BILIRUB SERPL-MCNC: 7.8 MG/DL — HIGH (ref 0.2–1.2)
BLD GP AB SCN SERPL QL: NEGATIVE — SIGNIFICANT CHANGE UP
BUN SERPL-MCNC: 43 MG/DL — HIGH (ref 7–23)
CALCIUM SERPL-MCNC: 9.2 MG/DL — SIGNIFICANT CHANGE UP (ref 8.4–10.5)
CHLORIDE SERPL-SCNC: 102 MMOL/L — SIGNIFICANT CHANGE UP (ref 96–108)
CO2 SERPL-SCNC: 23 MMOL/L — SIGNIFICANT CHANGE UP (ref 22–31)
CREAT SERPL-MCNC: 2.94 MG/DL — HIGH (ref 0.5–1.3)
EGFR: 15 ML/MIN/1.73M2 — LOW
EOSINOPHIL # BLD AUTO: 0 K/UL — SIGNIFICANT CHANGE UP (ref 0–0.5)
EOSINOPHIL NFR BLD AUTO: 0 % — SIGNIFICANT CHANGE UP (ref 0–6)
GLUCOSE SERPL-MCNC: 172 MG/DL — HIGH (ref 70–99)
HCT VFR BLD CALC: 22 % — LOW (ref 34.5–45)
HCT VFR BLD CALC: 25.2 % — LOW (ref 34.5–45)
HGB BLD-MCNC: 7.1 G/DL — LOW (ref 11.5–15.5)
HGB BLD-MCNC: 8.3 G/DL — LOW (ref 11.5–15.5)
IMM GRANULOCYTES NFR BLD AUTO: 0.8 % — SIGNIFICANT CHANGE UP (ref 0–1.5)
LYMPHOCYTES # BLD AUTO: 0.49 K/UL — LOW (ref 1–3.3)
LYMPHOCYTES # BLD AUTO: 4.4 % — LOW (ref 13–44)
MAGNESIUM SERPL-MCNC: 2 MG/DL — SIGNIFICANT CHANGE UP (ref 1.6–2.6)
MCHC RBC-ENTMCNC: 28.9 PG — SIGNIFICANT CHANGE UP (ref 27–34)
MCHC RBC-ENTMCNC: 29 PG — SIGNIFICANT CHANGE UP (ref 27–34)
MCHC RBC-ENTMCNC: 32.3 GM/DL — SIGNIFICANT CHANGE UP (ref 32–36)
MCHC RBC-ENTMCNC: 32.9 GM/DL — SIGNIFICANT CHANGE UP (ref 32–36)
MCV RBC AUTO: 88.1 FL — SIGNIFICANT CHANGE UP (ref 80–100)
MCV RBC AUTO: 89.4 FL — SIGNIFICANT CHANGE UP (ref 80–100)
MONOCYTES # BLD AUTO: 0.69 K/UL — SIGNIFICANT CHANGE UP (ref 0–0.9)
MONOCYTES NFR BLD AUTO: 6.1 % — SIGNIFICANT CHANGE UP (ref 2–14)
NEUTROPHILS # BLD AUTO: 9.94 K/UL — HIGH (ref 1.8–7.4)
NEUTROPHILS NFR BLD AUTO: 88.6 % — HIGH (ref 43–77)
NRBC # BLD: 0 /100 WBCS — SIGNIFICANT CHANGE UP (ref 0–0)
NRBC # BLD: 0 /100 WBCS — SIGNIFICANT CHANGE UP (ref 0–0)
PHOSPHATE SERPL-MCNC: 3.2 MG/DL — SIGNIFICANT CHANGE UP (ref 2.5–4.5)
PLATELET # BLD AUTO: 342 K/UL — SIGNIFICANT CHANGE UP (ref 150–400)
PLATELET # BLD AUTO: 350 K/UL — SIGNIFICANT CHANGE UP (ref 150–400)
POTASSIUM SERPL-MCNC: 3.8 MMOL/L — SIGNIFICANT CHANGE UP (ref 3.5–5.3)
POTASSIUM SERPL-SCNC: 3.8 MMOL/L — SIGNIFICANT CHANGE UP (ref 3.5–5.3)
PROT SERPL-MCNC: 6.4 G/DL — SIGNIFICANT CHANGE UP (ref 6–8.3)
RBC # BLD: 2.46 M/UL — LOW (ref 3.8–5.2)
RBC # BLD: 2.86 M/UL — LOW (ref 3.8–5.2)
RBC # FLD: 16.4 % — HIGH (ref 10.3–14.5)
RBC # FLD: 16.4 % — HIGH (ref 10.3–14.5)
RH IG SCN BLD-IMP: POSITIVE — SIGNIFICANT CHANGE UP
SODIUM SERPL-SCNC: 140 MMOL/L — SIGNIFICANT CHANGE UP (ref 135–145)
WBC # BLD: 11.19 K/UL — HIGH (ref 3.8–10.5)
WBC # BLD: 11.22 K/UL — HIGH (ref 3.8–10.5)
WBC # FLD AUTO: 11.19 K/UL — HIGH (ref 3.8–10.5)
WBC # FLD AUTO: 11.22 K/UL — HIGH (ref 3.8–10.5)

## 2022-07-24 PROCEDURE — 99232 SBSQ HOSP IP/OBS MODERATE 35: CPT | Mod: GC

## 2022-07-24 PROCEDURE — 99232 SBSQ HOSP IP/OBS MODERATE 35: CPT

## 2022-07-24 PROCEDURE — 99223 1ST HOSP IP/OBS HIGH 75: CPT | Mod: GC

## 2022-07-24 RX ORDER — CHOLESTYRAMINE 4 G/9G
4 POWDER, FOR SUSPENSION ORAL
Refills: 0 | Status: DISCONTINUED | OUTPATIENT
Start: 2022-07-24 | End: 2022-07-28

## 2022-07-24 RX ORDER — OXYCODONE HYDROCHLORIDE 5 MG/1
2.5 TABLET ORAL ONCE
Refills: 0 | Status: DISCONTINUED | OUTPATIENT
Start: 2022-07-24 | End: 2022-07-24

## 2022-07-24 RX ORDER — POTASSIUM PHOSPHATE, MONOBASIC POTASSIUM PHOSPHATE, DIBASIC 236; 224 MG/ML; MG/ML
15 INJECTION, SOLUTION INTRAVENOUS ONCE
Refills: 0 | Status: DISCONTINUED | OUTPATIENT
Start: 2022-07-24 | End: 2022-07-24

## 2022-07-24 RX ORDER — HEPARIN SODIUM 5000 [USP'U]/ML
5000 INJECTION INTRAVENOUS; SUBCUTANEOUS EVERY 8 HOURS
Refills: 0 | Status: DISCONTINUED | OUTPATIENT
Start: 2022-07-24 | End: 2022-07-24

## 2022-07-24 RX ORDER — CALAMINE AND ZINC OXIDE AND PHENOL 160; 10 MG/ML; MG/ML
1 LOTION TOPICAL
Refills: 0 | Status: DISCONTINUED | OUTPATIENT
Start: 2022-07-24 | End: 2022-07-28

## 2022-07-24 RX ORDER — FUROSEMIDE 40 MG
40 TABLET ORAL ONCE
Refills: 0 | Status: COMPLETED | OUTPATIENT
Start: 2022-07-24 | End: 2022-07-24

## 2022-07-24 RX ORDER — HEPARIN SODIUM 5000 [USP'U]/ML
5000 INJECTION INTRAVENOUS; SUBCUTANEOUS EVERY 8 HOURS
Refills: 0 | Status: DISCONTINUED | OUTPATIENT
Start: 2022-07-24 | End: 2022-07-28

## 2022-07-24 RX ADMIN — OXYCODONE HYDROCHLORIDE 2.5 MILLIGRAM(S): 5 TABLET ORAL at 03:09

## 2022-07-24 RX ADMIN — SIMETHICONE 80 MILLIGRAM(S): 80 TABLET, CHEWABLE ORAL at 11:58

## 2022-07-24 RX ADMIN — SIMETHICONE 80 MILLIGRAM(S): 80 TABLET, CHEWABLE ORAL at 23:14

## 2022-07-24 RX ADMIN — PANTOPRAZOLE SODIUM 40 MILLIGRAM(S): 20 TABLET, DELAYED RELEASE ORAL at 17:12

## 2022-07-24 RX ADMIN — SIMETHICONE 80 MILLIGRAM(S): 80 TABLET, CHEWABLE ORAL at 05:28

## 2022-07-24 RX ADMIN — Medication 40 MILLIGRAM(S): at 17:13

## 2022-07-24 RX ADMIN — OXYCODONE HYDROCHLORIDE 2.5 MILLIGRAM(S): 5 TABLET ORAL at 04:10

## 2022-07-24 RX ADMIN — CALAMINE AND ZINC OXIDE AND PHENOL 1 APPLICATION(S): 160; 10 LOTION TOPICAL at 13:22

## 2022-07-24 RX ADMIN — ATENOLOL 25 MILLIGRAM(S): 25 TABLET ORAL at 05:28

## 2022-07-24 RX ADMIN — Medication 3 MILLIGRAM(S): at 21:22

## 2022-07-24 RX ADMIN — OXYCODONE HYDROCHLORIDE 2.5 MILLIGRAM(S): 5 TABLET ORAL at 02:42

## 2022-07-24 RX ADMIN — Medication 30 MILLILITER(S): at 02:00

## 2022-07-24 RX ADMIN — SENNA PLUS 2 TABLET(S): 8.6 TABLET ORAL at 21:22

## 2022-07-24 RX ADMIN — OXYCODONE HYDROCHLORIDE 2.5 MILLIGRAM(S): 5 TABLET ORAL at 03:40

## 2022-07-24 RX ADMIN — PANTOPRAZOLE SODIUM 40 MILLIGRAM(S): 20 TABLET, DELAYED RELEASE ORAL at 05:28

## 2022-07-24 RX ADMIN — SIMETHICONE 80 MILLIGRAM(S): 80 TABLET, CHEWABLE ORAL at 17:13

## 2022-07-24 NOTE — PROGRESS NOTE ADULT - PROBLEM SELECTOR PLAN 1
Pruritis, pale stools, jaundice, dark urine, intermittent RUQ pain  Poor appetite past few days, denies weight loss.   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   - CA 19-9 Level of 56 (7/21)   - BCx 7/19 NGTD, UCx contaminated Pruritis, pale stools, jaundice, dark urine, intermittent RUQ pain  Poor appetite past few days, denies weight loss.   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   - CA 19-9 Level of 56 (7/21)   - Bcx NGTD, Ucx contaminated.

## 2022-07-24 NOTE — CONSULT NOTE ADULT - SUBJECTIVE AND OBJECTIVE BOX
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 Patient underwent CT A/P with contrast demonstrating the above finding on . Patient also treated with ceftriaxone for UTI and developed pulmonary edema and then received diuresis . Echo with EF of 60%. Nephrology called for CAMRON. Ec    Baseline creatinine is unknown but it was 2.06 on presentation peaked to 3.37 now downtrending to 2.94. UA with trace blood, Uprot/creatinine ratio of 0.2.       PAST HISTORY  --------------------------------------------------------------------------------  PAST MEDICAL & SURGICAL HISTORY:  HLD (hyperlipidemia)      GERD (gastroesophageal reflux disease)      HTN (hypertension)      H/O:   x2        FAMILY HISTORY:  Family history of diabetes mellitus (DM) (Mother)    FH: liver cancer (Child)      PAST SOCIAL HISTORY:    ALLERGIES & MEDICATIONS  --------------------------------------------------------------------------------  Allergies    penicillin (Unknown)  tetracaine (Unknown)    Intolerances      Standing Inpatient Medications  ATENolol  Tablet 25 milliGRAM(s) Oral daily  furosemide   Injectable 40 milliGRAM(s) IV Push once  melatonin 3 milliGRAM(s) Oral at bedtime  pantoprazole  Injectable 40 milliGRAM(s) IV Push every 12 hours  senna 2 Tablet(s) Oral at bedtime  simethicone 80 milliGRAM(s) Chew four times a day    PRN Inpatient Medications  aluminum hydroxide/magnesium hydroxide/simethicone Suspension 30 milliLiter(s) Oral every 4 hours PRN  zolpidem 5 milliGRAM(s) Oral at bedtime PRN      REVIEW OF SYSTEMS  --------------------------------------------------------------------------------  Gen: No fevers/chills  Skin: No rashes  Head/Eyes/Ears: Normal hearing,   Respiratory: No dyspnea, cough  CV: No chest pain  GI: No abdominal pain, diarrhea  : No dysuria, hematuria  MSK: No  edema  Heme: No easy bruising or bleeding  Psych: No significant depression    All other systems were reviewed and are negative, except as noted.    VITALS/PHYSICAL EXAM  --------------------------------------------------------------------------------  T(C): 36.8 (22 @ 04:35), Max: 37.5 (22 @ 21:09)  HR: 60 (22 @ 04:35) (53 - 61)  BP: 130/63 (22 @ 04:35) (130/63 - 146/63)  RR: 18 (22 @ 04:35) (18 - 18)  SpO2: 99% (22 @ 04:35) (94% - 99%)  Wt(kg): --        Physical Exam:  	Gen: NAD  	HEENT: MMM  	Pulm: CTA B/L  	CV: S1S2  	Abd: Soft, +BS   	Ext: No LE edema B/L  	Neuro: Awake  	Skin: Warm and dry  	Vascular access:    LABS/STUDIES  --------------------------------------------------------------------------------              7.1    11.22 >-----------<  342      [22 07:26]              22.0     140  |  102  |  43  ----------------------------<  172      [22 07:17]  3.8   |  23  |  2.94        Ca     9.2     [22 07:17]      Mg     2.0     [22 07:17]      Phos  3.2     [22 07:17]    TPro  6.4  /  Alb  3.3  /  TBili  7.8  /  DBili  x   /  AST  108  /  ALT  139  /  AlkPhos  665  [22 07:17]          Creatinine Trend:  SCr 2.94 [ 07:17]  SCr 2.92 [ 21:12]  SCr 3.27 [ 07:07]  SCr 3.37 [ 00:29]  SCr 3.29 [ 07:26]    Urinalysis - [22 10:26]      Color Yellow / Appearance Clear / SG 1.012 / pH 5.0      Gluc Negative / Ketone Negative  / Bili Small / Urobili Negative       Blood Trace / Protein Trace / Leuk Est Negative / Nitrite Negative      RBC 6 / WBC 3 / Hyaline 2 / Gran  / Sq Epi  / Non Sq Epi 1 / Bacteria Negative    Urine Creatinine 75      [22 @ 10:26]  Urine Protein 17      [22 10:26]  Urine Sodium 57      [22 10:26]  Urine Urea Nitrogen 378      [22 @ 10:26]  Urine Potassium 37      [22 @ 10:26]  Urine Osmolality 179      [22 @ 18:53]    Iron 41, TIBC 312, %sat 13      [22 @ 06:58]    HBsAg Nonreact      [22 @ 18:34]  HCV 0.11, Nonreact      [22 @ 18:34]

## 2022-07-24 NOTE — CONSULT NOTE ADULT - SUBJECTIVE AND OBJECTIVE BOX
CHIEF COMPLAINT:Patient is a 89y old  Female who presents with a chief complaint of Itching, orange urine, pale stools, RUQ abdominal pain (2022 07:38)      HPI:  89yof with HTN and HLD presented to outpatient doctor with 1 week of itching, orange urine, pale stools, and intermittent RUQ abdominal pain sent in by PCP for concern for malignancy. Patient reports the abdominal pain was more like a soreness, intermittent, 3/10 in severity. 1 episode of NBNB vomiting, patient attributed to reflux. Patient did report dark stool about 3 weeks prior to admission, denies PO iron use. Does report to be anemic, but denies dizziness, weakness, lightheadedness. Denies any history of kidney disease. Does report decreased appetite over past 4 days, decreased hydration as well. Patient had colonoscopy she thinks 4-5y ago, she believes also had endoscopy then, reports everything was normal. Did receive appropriate screening mammograms. Of note she did have muscles in a restaurant 10d ago, which is 3d prior to symptom onset. Denies fevers, but endorses chills. Denies hematemesis, hematuria, dysuria, urinary frequency.       PAST MEDICAL & SURGICAL HISTORY:  HLD (hyperlipidemia)      GERD (gastroesophageal reflux disease)      HTN (hypertension)      H/O:   x2          MEDICATIONS  (STANDING):  ATENolol  Tablet 25 milliGRAM(s) Oral daily  melatonin 3 milliGRAM(s) Oral at bedtime  pantoprazole  Injectable 40 milliGRAM(s) IV Push every 12 hours  senna 2 Tablet(s) Oral at bedtime  simethicone 80 milliGRAM(s) Chew four times a day    MEDICATIONS  (PRN):  aluminum hydroxide/magnesium hydroxide/simethicone Suspension 30 milliLiter(s) Oral every 4 hours PRN Dyspepsia  zolpidem 5 milliGRAM(s) Oral at bedtime PRN Insomnia      FAMILY HISTORY:  Family history of diabetes mellitus (DM) (Mother)    FH: liver cancer (Child)        SOCIAL HISTORY:    [x ] Non-smoker  [ ] Smoker  [ ] Alcohol    Allergies    penicillin (Unknown)  tetracaine (Unknown)    Intolerances    	    REVIEW OF SYSTEMS:  CONSTITUTIONAL: No fever, weight loss, or fatigue  EYES: No eye pain, visual disturbances, or discharge  ENT:  No difficulty hearing, tinnitus, vertigo; No sinus or throat pain  NECK: No pain or stiffness  RESPIRATORY: No cough, wheezing, chills or hemoptysis; No Shortness of Breath  CARDIOVASCULAR: No chest pain, palpitations, passing out, dizziness, or leg swelling  GASTROINTESTINAL: + abdominal or epigastric pain. No nausea, vomiting, or hematemesis; No diarrhea or constipation. No melena or hematochezia.  GENITOURINARY: No dysuria, frequency, hematuria, or incontinence  NEUROLOGICAL: No headaches, memory loss, loss of strength, numbness, or tremors  SKIN: No itching, burning, rashes, or lesions   LYMPH Nodes: No enlarged glands  ENDOCRINE: No heat or cold intolerance; No hair loss  MUSCULOSKELETAL: No joint pain or swelling; No muscle, back, or extremity pain  PSYCHIATRIC: No depression, anxiety, mood swings, or difficulty sleeping  HEME/LYMPH: No easy bruising, or bleeding gums  ALLERGY AND IMMUNOLOGIC: No hives or eczema	    [ ] All others negative	  [ ] Unable to obtain    PHYSICAL EXAM:  T(C): 36.8 (22 @ 04:35), Max: 37.5 (22 @ 21:09)  HR: 60 (22 @ 04:35) (53 - 61)  BP: 130/63 (22 @ 04:35) (130/63 - 146/63)  RR: 18 (22 @ 04:35) (18 - 18)  SpO2: 99% (22 @ 04:35) (94% - 99%)  Wt(kg): --  I&O's Summary      Appearance: Normal	  HEENT:   Normal oral mucosa, PERRL, EOMI	  Lymphatic: No lymphadenopathy  Cardiovascular: Normal S1 S2, No JVD, + murmurs, No edema  Respiratory: rhonchi	  Psychiatry: A & O x 3, Mood & affect appropriate  Gastrointestinal:  Soft, + RUQ tender, + BS	  Skin: No rashes, No ecchymoses, No cyanosis	  Neurologic: Non-focal  Extremities: Normal range of motion, No clubbing, cyanosis or edema  Vascular: Peripheral pulses palpable 2+ bilaterally    TELEMETRY: 	    ECG:  	  RADIOLOGY:  OTHER: 	  	  LABS:	 	    CARDIAC MARKERS:                              7.1    11.22 )-----------( 342      ( 2022 07:26 )             22.0     07-    140  |  102  |  43<H>  ----------------------------<  172<H>  3.8   |  23  |  2.94<H>    Ca    9.2      2022 07:  Phos  3.2       Mg     2.0         TPro  6.4  /  Alb  3.3  /  TBili  7.8<H>  /  DBili  x   /  AST  108<H>  /  ALT  139<H>  /  AlkPhos  665<H>      proBNP:   Lipid Profile:   HgA1c:   TSH:       PREVIOUS DIAGNOSTIC TESTING:    < from: 12 Lead ECG (22 @ 17:54) >  Diagnosis Line SINUS BRADYCARDIAWITH PREMATURE SUPRAVENTRICULAR COMPLEXES  LEFT AXIS DEVIATION  LEFT BUNDLE BRANCH BLOCK  ABNORMAL ECG  NO PREVIOUS ECGS AVAILABLE    < from: Transthoracic Echocardiogram (22 @ 12:10) >  Mitral Valve: Mitral annular calcification. Mild mitral  regurgitation.  Aortic Valve/Aorta: Calcified aortic valve with normal  opening. Mild aortic regurgitation.  Normal aortic root size.  Left Atrium: Moderately dilated left atrium.  Left Ventricle: Normal left ventricular internal dimensions  and wall thickness.  Normal left ventricular systolic function. No segmental  wall motion abnormalities.  Left ventricular filling pressure is elevated.  Right Heart: Normal right atrium. Normal right ventricular  size and function.  Normal tricuspid valve. Minimal tricuspid regurgitation.  Normal pulmonic valve. Mild pulmonic regurgitation.  Pericardium/Pleura: Normal pericardium with no pericardial  effusion.  Hemodynamic: Estimated right atrial pressure is normal.  Mild-moderate pulmonary hypertension. Estimated PASP 50  mmHg.  ------------------------------------------------------------------------  Conclusions:  Normal left ventricular systolic function. No segmental  wall motion abnormalities.  Mild-moderate pulmonary hypertension.    < from: Xray Chest 1 View- PORTABLE-Urgent (Xray Chest 1 View- PORTABLE-Urgent .) (22 @ 11:19) >  Decreased pulmonary edema.    < from: CT Abdomen and Pelvis w/ IV Cont (22 @ 17:24) >  Diffuse intrahepatic and extra hepatic biliary ductal dilatation with   common bile duct measuring 1.3 cm with abrupt narrowing near the ampulla.   Biliary obstruction possibly secondary to ampullary mass, biliary   stricture or small stone. No focal hypoenhancing pancreatic lesion or   dilatation of the main pancreatic duct. Further evaluation can be   obtained with MRCP with IV contrast.

## 2022-07-24 NOTE — PROGRESS NOTE ADULT - PROBLEM SELECTOR PLAN 2
- Creatinine 2.9 (7/24) this AM, current downtrend but still elevated from from admission level of 2.016  Previous creatinine of Cr 1.58 (2018) patient denies Hx CKD however.   Of note has 5cm b/l renal cysts seen on CTAP.   - less likely contrast nephropathy due to <48 hour time window since receiving last contrast.   - avoid nephrotoxins  - hydrochlorothiazide 25 mg daily and losartan 100 mg daily currently held given CAMRON on CKD.  - FENA of 1.8% (7/23) indicating intrinsic renal injury  - renal consult in AM - Creatinine 2.9 (7/24) this AM, current downtrend but still elevated from from admission level of 2.016  Previous creatinine of Cr 1.58 (2018) patient denies Hx CKD however.   Of note has 5cm b/l renal cysts seen on CTAP.   - less likely contrast nephropathy due to <48 hour time window since receiving last contrast.   - avoid nephrotoxins  - hydrochlorothiazide 25 mg daily and losartan 100 mg daily currently held given CAMRON on CKD.  - FENA of 1.8% (7/23) indicating intrinsic renal injury  - renal US per renal recommendations.

## 2022-07-24 NOTE — PROGRESS NOTE ADULT - ASSESSMENT
89yo F with PMH of HTN and HLD who presents with pruritus x 1 week and RUQ pain, found to have biliary dilatation concerning for obstruction.    # Dyspnea and acute hypoxic respiratory failure - likely in the setting of hypervolemia  # Obstructive jaundice - DDx includes ampullary malignancy vs. stones/sludge vs. benign stricture. Patient is symptomatic with pruritus. No signs concerning for cholangitis. Would plan for EUS and ERCP and stent placement - however currently not optimized for a procedure given dyspnea and pulmonary edema  # Anemia - Normocytic. No overt blood loss.  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    Jeff Liang  GI/Hepatology Fellow    MONDAY-FRIDAY 8AM-5PM:  Pager# [phone] ([facility]) or [phone] ([facility])    NON-URGENT CONSULTS:  Please email [email or web address] OR [email or web address]  AT NIGHT AND ON WEEKENDS:  Contact on-call GI fellow via answering service ([phone]) from 5pm-8am and on weekends/holidays

## 2022-07-24 NOTE — PROGRESS NOTE ADULT - PROBLEM SELECTOR PLAN 3
Hgb of 7.5 (7/24)   Hgb of 11 as of March, 2022  Hb on arrival 7.9, MCV 96  No obvious signs of bleeding, hemodynamically stable, FOBT negative.  Reported melena 3 weeks ago, no PO iron use.  - empiric protonox 40 IV BID for now  - monitor Hb, trasnfuse to Hb > 7  - reticulocyte study pending   - iron studies wnl  - B12 folate pending   - FOBT negative Hgb of 7.5 (7/24)   - transfused 1 unit pRBC on 7/24 per Cardiology recs for clearance.   Hgb of 11 as of March, 2022  Hb on arrival 7.9, MCV 96  No obvious signs of bleeding, hemodynamically stable, FOBT negative.  Reported melena 3 weeks ago, no PO iron use.  - empiric protonix 40 IV BID for now, plan to transition to PO on 7/25.   - monitor Hb, transfuse to Hb > 7  - reticulocyte and ferritin levels in the AM.   - B12 folate pending   - FOBT negative Hgb of 7.5 (7/24)   - transfused 1 unit pRBC on 7/24 per Cardiology recs for clearance.   - f/u post transfusion CBC  Hgb of 11 as of March, 2022  Hb on arrival 7.9, MCV 96  No obvious signs of bleeding, hemodynamically stable, FOBT negative.  Reported melena 3 weeks ago, no PO iron use.  - empiric protonix 40 IV BID for now, plan to transition to PO on 7/25.   - monitor Hb, transfuse to Hb > 7  - reticulocyte and ferritin levels in the AM.   - B12 folate pending   - FOBT negative Hgb of 7.5 (7/24)   - transfused 1 unit pRBC on 7/24 per Cardiology recs for preop optimization   - f/u post transfusion CBC  Hgb of 11 as of March, 2022  Hb on arrival 7.9, MCV 96  No obvious signs of bleeding, hemodynamically stable, FOBT negative.  Reported melena 3 weeks ago, no PO iron use.  - empiric protonix 40 IV BID for now, plan to transition to PO on 7/25.   - monitor Hb, transfuse to Hb > 7  - reticulocyte and ferritin levels in the AM.   - B12 folate pending   - FOBT negative

## 2022-07-24 NOTE — PROGRESS NOTE ADULT - SUBJECTIVE AND OBJECTIVE BOX
**************************************  Alex Brooke, PGY-1    After 7PM, please contact night float at [phone] or [phone]  **************************************    INTERVAL HPI/OVERNIGHT EVENTS:  Overnight pt was complaining of 6/10 epigastric pain that radiates to the back. Pt was given 2.5 oxycodone oral x 2 and Maalox improvement of pain. Pt demonstrated diffuse mild tenderness to palpation over the abdomen per night team. Patient denies fevers, chills, nausea, vomiting, headache, changes to bowel movements and changes in urination. ROS otherwise negative.    VITAL SIGNS:  T(F): 98.3 (22 @ 04:35)  HR: 60 (22 @ 04:35)  BP: 130/63 (22 @ 04:35)  RR: 18 (22 @ 04:35)  SpO2: 99% (22 @ 04:35)  Wt(kg): --    PHYSICAL EXAM:    Constitutional: WDWN, NAD  HEENT: non-icteric sclera, no conjunctival pallor appreciated.   Respiratory: CTA b/l, good air entry b/l, no wheezing, no rhonchi, no rales, without accessory muscle use and no intercostal retractions  Cardiovascular: RRR, normal S1S2, no M/R/G  Gastrointestinal: soft, NTND, no masses palpable, BS normal  Extremities: Warm, well perfused, pulses equal bilateral upper and lower extremities, no edema, no clubbing. Capillary refill <2 sec  Neurological: AAOx3,  Skin: Normal temperature, warm, dry    MEDICATIONS  (STANDING):  ATENolol  Tablet 25 milliGRAM(s) Oral daily  melatonin 3 milliGRAM(s) Oral at bedtime  pantoprazole  Injectable 40 milliGRAM(s) IV Push every 12 hours  potassium phosphate IVPB 15 milliMole(s) IV Intermittent once  senna 2 Tablet(s) Oral at bedtime  simethicone 80 milliGRAM(s) Chew four times a day    MEDICATIONS  (PRN):  aluminum hydroxide/magnesium hydroxide/simethicone Suspension 30 milliLiter(s) Oral every 4 hours PRN Dyspepsia  zolpidem 5 milliGRAM(s) Oral at bedtime PRN Insomnia      Allergies    penicillin (Unknown)  tetracaine (Unknown)    Intolerances        LABS:                        7.5    10.93 )-----------( 383      ( 2022 07:07 )             23.2         137  |  101  |  42<H>  ----------------------------<  162<H>  3.9   |  21<L>  |  2.92<H>    Ca    9.0      2022 21:12  Phos  2.2       Mg     1.9         TPro  7.0  /  Alb  3.5  /  TBili  6.6<H>  /  DBili  x   /  AST  103<H>  /  ALT  151<H>  /  AlkPhos  714<H>        Urinalysis Basic - ( 2022 10:26 )    Color: Yellow / Appearance: Clear / S.012 / pH: x  Gluc: x / Ketone: Negative  / Bili: Small / Urobili: Negative   Blood: x / Protein: Trace / Nitrite: Negative   Leuk Esterase: Negative / RBC: 6 /hpf / WBC 3 /HPF   Sq Epi: x / Non Sq Epi: 1 /hpf / Bacteria: Negative        RADIOLOGY & ADDITIONAL TESTS:  Reviewed **************************************  Vikashmartinez Mendy, PGY-1  After 7PM, please contact night float at [phone] or [phone]  **************************************    INTERVAL HPI/OVERNIGHT EVENTS:  Overnight pt was complaining of 6/10 epigastric pain that radiates to the back. Pt was given 2.5 oxycodone oral x 2 and Maalox improvement of pain. Pt demonstrated diffuse mild tenderness to palpation over the abdomen per night team. Pt reported that the abdominal discomfort was making it uncomfortable for her to take deep breaths so she was put back on 2L NC overnight. Patient denies fevers, chills, nausea, vomiting, headache, changes to bowel movements and changes in urination. ROS otherwise negative.    VITAL SIGNS:  T(F): 98.3 (22 @ 04:35)  HR: 60 (22 @ 04:35)  BP: 130/63 (22 @ 04:35)  RR: 18 (22 @ 04:35)  SpO2: 99% (22 @ 04:35)  Wt(kg): --    PHYSICAL EXAM:    Constitutional: WDWN, NAD  HEENT: non-icteric sclera, no conjunctival pallor appreciated.   Respiratory: CTA b/l, good air entry b/l, no wheezing, no rhonchi, no rales, without accessory muscle use and no intercostal retractions  Cardiovascular: RRR, normal S1S2, no M/R/G  Gastrointestinal: soft, NTND, no masses palpable, BS normal  Extremities: Warm, well perfused, pulses equal bilateral upper and lower extremities, no edema, no clubbing. Capillary refill <2 sec  Neurological: AAOx3,  Skin: Normal temperature, warm, dry    MEDICATIONS  (STANDING):  ATENolol  Tablet 25 milliGRAM(s) Oral daily  melatonin 3 milliGRAM(s) Oral at bedtime  pantoprazole  Injectable 40 milliGRAM(s) IV Push every 12 hours  potassium phosphate IVPB 15 milliMole(s) IV Intermittent once  senna 2 Tablet(s) Oral at bedtime  simethicone 80 milliGRAM(s) Chew four times a day    MEDICATIONS  (PRN):  aluminum hydroxide/magnesium hydroxide/simethicone Suspension 30 milliLiter(s) Oral every 4 hours PRN Dyspepsia  zolpidem 5 milliGRAM(s) Oral at bedtime PRN Insomnia      Allergies    penicillin (Unknown)  tetracaine (Unknown)    Intolerances        LABS:                        7.5    10.93 )-----------( 383      ( 2022 07:07 )             23.2         137  |  101  |  42<H>  ----------------------------<  162<H>  3.9   |  21<L>  |  2.92<H>    Ca    9.0      2022 21:12  Phos  2.2       Mg     1.9         TPro  7.0  /  Alb  3.5  /  TBili  6.6<H>  /  DBili  x   /  AST  103<H>  /  ALT  151<H>  /  AlkPhos  714<H>        Urinalysis Basic - ( 2022 10:26 )    Color: Yellow / Appearance: Clear / S.012 / pH: x  Gluc: x / Ketone: Negative  / Bili: Small / Urobili: Negative   Blood: x / Protein: Trace / Nitrite: Negative   Leuk Esterase: Negative / RBC: 6 /hpf / WBC 3 /HPF   Sq Epi: x / Non Sq Epi: 1 /hpf / Bacteria: Negative        RADIOLOGY & ADDITIONAL TESTS:  Reviewed **************************************  Alex Brooke, PGY-1  After 7PM, please contact night float at #[phone] or [phone]  **************************************    INTERVAL HPI/OVERNIGHT EVENTS:  Overnight pt was complaining of 6/10 epigastric pain that radiates to the back. Pt was given 2.5 oxycodone oral x 2 and Maalox improvement of pain. Pt demonstrated diffuse mild tenderness to palpation over the abdomen per night team. Pt reported that the abdominal discomfort was making it uncomfortable for her to take deep breaths so she was put back on 2L NC overnight. Patient denies fevers, chills, nausea, vomiting, headache, changes to bowel movements and changes in urination. ROS otherwise negative.    VITAL SIGNS:  T(F): 98.3 (22 @ 04:35)  HR: 60 (22 @ 04:35)  BP: 130/63 (22 @ 04:35)  RR: 18 (22 @ 04:35)  SpO2: 99% (22 @ 04:35)  Wt(kg): --    PHYSICAL EXAM:    Constitutional: WDWN, NAD  HEENT: non-icteric sclera, no conjunctival pallor appreciated.   Respiratory: CTA b/l, no labored breathing.   Cardiovascular: RRR, normal S1S2, no M/R/G  Gastrointestinal: soft, NTND, no masses palpable,  Extremities: Warm, well perfused, no edema appreciated in the b/l lower extremities.   Neurological: AAOx3,  Skin: Normal temperature, warm, dry    MEDICATIONS  (STANDING):  ATENolol  Tablet 25 milliGRAM(s) Oral daily  melatonin 3 milliGRAM(s) Oral at bedtime  pantoprazole  Injectable 40 milliGRAM(s) IV Push every 12 hours  potassium phosphate IVPB 15 milliMole(s) IV Intermittent once  senna 2 Tablet(s) Oral at bedtime  simethicone 80 milliGRAM(s) Chew four times a day    MEDICATIONS  (PRN):  aluminum hydroxide/magnesium hydroxide/simethicone Suspension 30 milliLiter(s) Oral every 4 hours PRN Dyspepsia  zolpidem 5 milliGRAM(s) Oral at bedtime PRN Insomnia      Allergies    penicillin (Unknown)  tetracaine (Unknown)    Intolerances        LABS:                        7.5    10.93 )-----------( 383      ( 2022 07:07 )             23.2         137  |  101  |  42<H>  ----------------------------<  162<H>  3.9   |  21<L>  |  2.92<H>    Ca    9.0      2022 21:12  Phos  2.2       Mg     1.9         TPro  7.0  /  Alb  3.5  /  TBili  6.6<H>  /  DBili  x   /  AST  103<H>  /  ALT  151<H>  /  AlkPhos  714<H>        Urinalysis Basic - ( 2022 10:26 )    Color: Yellow / Appearance: Clear / S.012 / pH: x  Gluc: x / Ketone: Negative  / Bili: Small / Urobili: Negative   Blood: x / Protein: Trace / Nitrite: Negative   Leuk Esterase: Negative / RBC: 6 /hpf / WBC 3 /HPF   Sq Epi: x / Non Sq Epi: 1 /hpf / Bacteria: Negative        RADIOLOGY & ADDITIONAL TESTS:  Reviewed **************************************  Alex Brooke, PGY-1  After 7PM, please contact night float at #[phone] or [phone]  **************************************    INTERVAL HPI/OVERNIGHT EVENTS:  Overnight pt was complaining of 6/10 epigastric pain that radiates to the back. Pt was given 2.5 oxycodone oral x 2 and Maalox w/ improvement of pain. Pt demonstrated diffuse mild tenderness to palpation over the abdomen per night team. Pt reported that the abdominal discomfort was making it uncomfortable for her to take deep breaths so she was put back on 2L NC overnight. Patient denies fevers, chills, nausea, vomiting, headache, changes to bowel movements and changes in urination. ROS otherwise negative.    VITAL SIGNS:  T(F): 98.3 (22 @ 04:35)  HR: 60 (22 @ 04:35)  BP: 130/63 (22 @ 04:35)  RR: 18 (22 @ 04:35)  SpO2: 99% (22 @ 04:35)  Wt(kg): --    PHYSICAL EXAM:    Constitutional: WDWN, NAD  HEENT: non-icteric sclera, no conjunctival pallor appreciated.   Respiratory: CTA b/l, no labored breathing.   Cardiovascular: RRR, normal S1S2, no M/R/G  Gastrointestinal: soft, NTND, no masses palpable,  Extremities: Warm, well perfused, no edema appreciated in the b/l lower extremities.   Neurological: AAOx3,  Skin: Normal temperature, warm, dry    MEDICATIONS  (STANDING):  ATENolol  Tablet 25 milliGRAM(s) Oral daily  melatonin 3 milliGRAM(s) Oral at bedtime  pantoprazole  Injectable 40 milliGRAM(s) IV Push every 12 hours  potassium phosphate IVPB 15 milliMole(s) IV Intermittent once  senna 2 Tablet(s) Oral at bedtime  simethicone 80 milliGRAM(s) Chew four times a day    MEDICATIONS  (PRN):  aluminum hydroxide/magnesium hydroxide/simethicone Suspension 30 milliLiter(s) Oral every 4 hours PRN Dyspepsia  zolpidem 5 milliGRAM(s) Oral at bedtime PRN Insomnia      Allergies    penicillin (Unknown)  tetracaine (Unknown)    Intolerances        LABS:                        7.5    10.93 )-----------( 383      ( 2022 07:07 )             23.2         137  |  101  |  42<H>  ----------------------------<  162<H>  3.9   |  21<L>  |  2.92<H>    Ca    9.0      2022 21:12  Phos  2.2       Mg     1.9         TPro  7.0  /  Alb  3.5  /  TBili  6.6<H>  /  DBili  x   /  AST  103<H>  /  ALT  151<H>  /  AlkPhos  714<H>        Urinalysis Basic - ( 2022 10:26 )    Color: Yellow / Appearance: Clear / S.012 / pH: x  Gluc: x / Ketone: Negative  / Bili: Small / Urobili: Negative   Blood: x / Protein: Trace / Nitrite: Negative   Leuk Esterase: Negative / RBC: 6 /hpf / WBC 3 /HPF   Sq Epi: x / Non Sq Epi: 1 /hpf / Bacteria: Negative        RADIOLOGY & ADDITIONAL TESTS:  Reviewed

## 2022-07-24 NOTE — CONSULT NOTE ADULT - PROBLEM SELECTOR RECOMMENDATION 9
-unknown baseline creatinine function  - creatinine now downtrending to 2.46  -UA and Uprot/cr bland  - can obtain a renal sono to determine CKD status  - avoid fluids or diuretics at this time    If you have any questions, please feel free to contact me  Ayden Sheppard  Nephrology Fellow  [phone]; Prefer [employer] TEAMS  (After 5pm or on weekends please page the on-call fellow).    Patient was discussed with Dr. Larson who agrees with my A/P. Addendum to follow -unknown baseline creatinine function  - creatinine now downtrending to 2.46  - UA and Uprot/cr bland  - can obtain a renal sono to determine CKD status  - avoid fluids or diuretics at this time    If you have any questions, please feel free to contact me  Ayden Sheppard  Nephrology Fellow  [phone]; Prefer [employer] TEAMS  (After 5pm or on weekends please page the on-call fellow).    Patient was discussed with Dr. Larson who agrees with my A/P. Addendum to follow

## 2022-07-24 NOTE — CONSULT NOTE ADULT - ASSESSMENT
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 Unclear etiology, could be multifactorial from receiving contrast and diureUprot/cr ratio is normal, UA bland,tics concurrently, Had a UTI that was treated with creatinine now downtrending

## 2022-07-24 NOTE — CONSULT NOTE ADULT - ASSESSMENT
89yof with HTN and HLD presented to outpatient doctor with 1 week of itching, orange urine, pale stools, and intermittent RUQ abdominal pain sent in by PCP for concern for malignancy. Patient reports the abdominal pain was more like a soreness, intermittent, 3/10 in severity. 1 episode of NBNB vomiting, patient attributed to reflux. Patient did report dark stool about 3 weeks prior to admission, denies PO iron use. Does report to be anemic, but denies dizziness, weakness, lightheadedness. Denies any history of kidney disease. Does report decreased appetite over past 4 days, decreased hydration as well. Patient had colonoscopy she thinks 4-5y ago, she believes also had endoscopy then, reports everything was normal. Did receive appropriate screening mammograms. Of note she did have muscles in a restaurant 10d ago, which is 3d prior to symptom onset. Denies fevers, but endorses chills. Denies hematemesis, hematuria, dysuria, urinary frequency.   pt with hx of htn , ckd stage 3, with RUQ abdominal pain.  pt denies of any cardiac hx , with LBBB ?old, echo with normal EF and no WMA with mod pulmonary htn  transfuse one unit of prbc, Lasix one dose post transfusion  pt is clear cardiac wise for the ERCP with mod risk  will follow up  dvt prophylaxis  thank bibiana

## 2022-07-24 NOTE — PROGRESS NOTE ADULT - PROBLEM SELECTOR PLAN 6
DVT: SCDs for now given anemia  Diet: Regular, as tolerated   Dispo: Malignancy workup DVT: Heparin 5000 IU Subcutaneous Q8H  Diet: Regular, as tolerated   Dispo: Malignancy workup

## 2022-07-24 NOTE — PROGRESS NOTE ADULT - ASSESSMENT
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

## 2022-07-24 NOTE — PROGRESS NOTE ADULT - SUBJECTIVE AND OBJECTIVE BOX
Interval Events:   No acute events overnight.  Patient still on supplemental oxygen.  Patient asking to go home.    ROS:   12 point review of systems performed and negative except otherwise noted in HPI.    Hospital Medications:  aluminum hydroxide/magnesium hydroxide/simethicone Suspension 30 milliLiter(s) Oral every 4 hours PRN  ATENolol  Tablet 25 milliGRAM(s) Oral daily  calamine/zinc oxide Lotion 1 Application(s) Topical four times a day PRN  cholestyramine Powder (Sugar-Free) 4 Gram(s) Oral two times a day PRN  furosemide   Injectable 40 milliGRAM(s) IV Push once  heparin   Injectable 5000 Unit(s) SubCutaneous every 8 hours  melatonin 3 milliGRAM(s) Oral at bedtime  pantoprazole  Injectable 40 milliGRAM(s) IV Push every 12 hours  senna 2 Tablet(s) Oral at bedtime  simethicone 80 milliGRAM(s) Chew four times a day  zolpidem 5 milliGRAM(s) Oral at bedtime PRN      PHYSICAL EXAM:   Vital Signs:  Vital Signs Last 24 Hrs  T(C): 36.4 (24 Jul 2022 13:20), Max: 37.5 (23 Jul 2022 21:09)  T(F): 97.6 (24 Jul 2022 13:20), Max: 99.5 (23 Jul 2022 21:09)  HR: 55 (24 Jul 2022 13:20) (53 - 61)  BP: 149/76 (24 Jul 2022 13:20) (130/63 - 149/76)  BP(mean): --  RR: 18 (24 Jul 2022 13:20) (18 - 18)  SpO2: 96% (24 Jul 2022 13:20) (94% - 99%)    Parameters below as of 24 Jul 2022 13:20  Patient On (Oxygen Delivery Method): nasal cannula  O2 Flow (L/min): 2    Daily     Daily     GENERAL: no acute distress  NEURO: alert  HEENT: NCAT, no conjunctival pallor appreciated  CHEST: no respiratory distress, no accessory muscle use  CARDIAC: regular rate, +S1/S2  ABDOMEN: soft, nondistended, nontender, no rebound or guarding  EXTREMITIES: warm, well perfused  SKIN: no lesions noted    LABS: reviewed                        7.1    11.22 )-----------( 342      ( 24 Jul 2022 07:26 )             22.0     07-24    140  |  102  |  43<H>  ----------------------------<  172<H>  3.8   |  23  |  2.94<H>    Ca    9.2      24 Jul 2022 07:17  Phos  3.2     07-24  Mg     2.0     07-24    TPro  6.4  /  Alb  3.3  /  TBili  7.8<H>  /  DBili  x   /  AST  108<H>  /  ALT  139<H>  /  AlkPhos  665<H>  07-24    LIVER FUNCTIONS - ( 24 Jul 2022 07:17 )  Alb: 3.3 g/dL / Pro: 6.4 g/dL / ALK PHOS: 665 U/L / ALT: 139 U/L / AST: 108 U/L / GGT: x             Interval Diagnostic Studies: see sunrise for full report   Interval Events:   No acute events overnight.  Patient still on supplemental oxygen.  Patient asking to go home.    ROS:   12 point review of systems performed and negative except otherwise noted in HPI.    Hospital Medications:  aluminum hydroxide/magnesium hydroxide/simethicone Suspension 30 milliLiter(s) Oral every 4 hours PRN  ATENolol  Tablet 25 milliGRAM(s) Oral daily  calamine/zinc oxide Lotion 1 Application(s) Topical four times a day PRN  cholestyramine Powder (Sugar-Free) 4 Gram(s) Oral two times a day PRN  furosemide   Injectable 40 milliGRAM(s) IV Push once  heparin   Injectable 5000 Unit(s) SubCutaneous every 8 hours  melatonin 3 milliGRAM(s) Oral at bedtime  pantoprazole  Injectable 40 milliGRAM(s) IV Push every 12 hours  senna 2 Tablet(s) Oral at bedtime  simethicone 80 milliGRAM(s) Chew four times a day  zolpidem 5 milliGRAM(s) Oral at bedtime PRN      PHYSICAL EXAM:   Vital Signs:  Vital Signs Last 24 Hrs  T(C): 36.4 (24 Jul 2022 13:20), Max: 37.5 (23 Jul 2022 21:09)  T(F): 97.6 (24 Jul 2022 13:20), Max: 99.5 (23 Jul 2022 21:09)  HR: 55 (24 Jul 2022 13:20) (53 - 61)  BP: 149/76 (24 Jul 2022 13:20) (130/63 - 149/76)  BP(mean): --  RR: 18 (24 Jul 2022 13:20) (18 - 18)  SpO2: 96% (24 Jul 2022 13:20) (94% - 99%)    Parameters below as of 24 Jul 2022 13:20  Patient On (Oxygen Delivery Method): nasal cannula  O2 Flow (L/min): 2    Daily     Daily     GENERAL: no acute distress  NEURO: alert  HEENT: NCAT, no conjunctival pallor appreciated; +icterius   CHEST: no respiratory distress, no accessory muscle use  CARDIAC: regular rate, +S1/S2  ABDOMEN: soft, nondistended, nontender, no rebound or guarding  EXTREMITIES: warm, well perfused  SKIN: no lesions noted; grossly jaundiced    LABS: reviewed                        7.1    11.22 )-----------( 342      ( 24 Jul 2022 07:26 )             22.0     07-24    140  |  102  |  43<H>  ----------------------------<  172<H>  3.8   |  23  |  2.94<H>    Ca    9.2      24 Jul 2022 07:17  Phos  3.2     07-24  Mg     2.0     07-24    TPro  6.4  /  Alb  3.3  /  TBili  7.8<H>  /  DBili  x   /  AST  108<H>  /  ALT  139<H>  /  AlkPhos  665<H>  07-24    LIVER FUNCTIONS - ( 24 Jul 2022 07:17 )  Alb: 3.3 g/dL / Pro: 6.4 g/dL / ALK PHOS: 665 U/L / ALT: 139 U/L / AST: 108 U/L / GGT: x             Interval Diagnostic Studies: see sunrise for full report

## 2022-07-25 ENCOUNTER — RESULT REVIEW (OUTPATIENT)
Age: 87
End: 2022-07-25

## 2022-07-25 LAB
ALBUMIN SERPL ELPH-MCNC: 3.5 G/DL — SIGNIFICANT CHANGE UP (ref 3.3–5)
ALP SERPL-CCNC: 748 U/L — HIGH (ref 40–120)
ALT FLD-CCNC: 143 U/L — HIGH (ref 10–45)
ANION GAP SERPL CALC-SCNC: 15 MMOL/L — SIGNIFICANT CHANGE UP (ref 5–17)
AST SERPL-CCNC: 109 U/L — HIGH (ref 10–40)
BASOPHILS # BLD AUTO: 0.02 K/UL — SIGNIFICANT CHANGE UP (ref 0–0.2)
BASOPHILS NFR BLD AUTO: 0.2 % — SIGNIFICANT CHANGE UP (ref 0–2)
BILIRUB SERPL-MCNC: 10.4 MG/DL — HIGH (ref 0.2–1.2)
BUN SERPL-MCNC: 45 MG/DL — HIGH (ref 7–23)
CALCIUM SERPL-MCNC: 9.5 MG/DL — SIGNIFICANT CHANGE UP (ref 8.4–10.5)
CHLORIDE SERPL-SCNC: 100 MMOL/L — SIGNIFICANT CHANGE UP (ref 96–108)
CO2 SERPL-SCNC: 25 MMOL/L — SIGNIFICANT CHANGE UP (ref 22–31)
CREAT SERPL-MCNC: 2.42 MG/DL — HIGH (ref 0.5–1.3)
CULTURE RESULTS: SIGNIFICANT CHANGE UP
CULTURE RESULTS: SIGNIFICANT CHANGE UP
EGFR: 19 ML/MIN/1.73M2 — LOW
EOSINOPHIL # BLD AUTO: 0.02 K/UL — SIGNIFICANT CHANGE UP (ref 0–0.5)
EOSINOPHIL NFR BLD AUTO: 0.2 % — SIGNIFICANT CHANGE UP (ref 0–6)
FERRITIN SERPL-MCNC: 103 NG/ML — SIGNIFICANT CHANGE UP (ref 15–150)
FOLATE SERPL-MCNC: >20 NG/ML — SIGNIFICANT CHANGE UP
GLUCOSE SERPL-MCNC: 152 MG/DL — HIGH (ref 70–99)
HAPTOGLOB SERPL-MCNC: 272 MG/DL — HIGH (ref 34–200)
HCT VFR BLD CALC: 27.1 % — LOW (ref 34.5–45)
HGB BLD-MCNC: 8.5 G/DL — LOW (ref 11.5–15.5)
IMM GRANULOCYTES NFR BLD AUTO: 0.7 % — SIGNIFICANT CHANGE UP (ref 0–1.5)
LDH SERPL L TO P-CCNC: 302 U/L — HIGH (ref 50–242)
LYMPHOCYTES # BLD AUTO: 0.5 K/UL — LOW (ref 1–3.3)
LYMPHOCYTES # BLD AUTO: 4.5 % — LOW (ref 13–44)
MAGNESIUM SERPL-MCNC: 2 MG/DL — SIGNIFICANT CHANGE UP (ref 1.6–2.6)
MCHC RBC-ENTMCNC: 28 PG — SIGNIFICANT CHANGE UP (ref 27–34)
MCHC RBC-ENTMCNC: 31.4 GM/DL — LOW (ref 32–36)
MCV RBC AUTO: 89.1 FL — SIGNIFICANT CHANGE UP (ref 80–100)
MONOCYTES # BLD AUTO: 0.82 K/UL — SIGNIFICANT CHANGE UP (ref 0–0.9)
MONOCYTES NFR BLD AUTO: 7.3 % — SIGNIFICANT CHANGE UP (ref 2–14)
NEUTROPHILS # BLD AUTO: 9.78 K/UL — HIGH (ref 1.8–7.4)
NEUTROPHILS NFR BLD AUTO: 87.1 % — HIGH (ref 43–77)
NRBC # BLD: 0 /100 WBCS — SIGNIFICANT CHANGE UP (ref 0–0)
PHOSPHATE SERPL-MCNC: 3.1 MG/DL — SIGNIFICANT CHANGE UP (ref 2.5–4.5)
PLATELET # BLD AUTO: 349 K/UL — SIGNIFICANT CHANGE UP (ref 150–400)
POTASSIUM SERPL-MCNC: 3.3 MMOL/L — LOW (ref 3.5–5.3)
POTASSIUM SERPL-SCNC: 3.3 MMOL/L — LOW (ref 3.5–5.3)
PROT SERPL-MCNC: 6.9 G/DL — SIGNIFICANT CHANGE UP (ref 6–8.3)
RAPID RVP RESULT: SIGNIFICANT CHANGE UP
RBC # BLD: 3.04 M/UL — LOW (ref 3.8–5.2)
RBC # BLD: 3.04 M/UL — LOW (ref 3.8–5.2)
RBC # FLD: 17.7 % — HIGH (ref 10.3–14.5)
RETICS #: 101.5 K/UL — SIGNIFICANT CHANGE UP (ref 25–125)
RETICS/RBC NFR: 3.3 % — HIGH (ref 0.5–2.5)
SARS-COV-2 RNA SPEC QL NAA+PROBE: SIGNIFICANT CHANGE UP
SARS-COV-2 RNA SPEC QL NAA+PROBE: SIGNIFICANT CHANGE UP
SODIUM SERPL-SCNC: 140 MMOL/L — SIGNIFICANT CHANGE UP (ref 135–145)
SPECIMEN SOURCE: SIGNIFICANT CHANGE UP
SPECIMEN SOURCE: SIGNIFICANT CHANGE UP
VIT B12 SERPL-MCNC: 1156 PG/ML — SIGNIFICANT CHANGE UP (ref 232–1245)
WBC # BLD: 11.22 K/UL — HIGH (ref 3.8–10.5)
WBC # FLD AUTO: 11.22 K/UL — HIGH (ref 3.8–10.5)

## 2022-07-25 PROCEDURE — 88305 TISSUE EXAM BY PATHOLOGIST: CPT | Mod: 26

## 2022-07-25 PROCEDURE — 99233 SBSQ HOSP IP/OBS HIGH 50: CPT | Mod: GC

## 2022-07-25 PROCEDURE — 43239 EGD BIOPSY SINGLE/MULTIPLE: CPT | Mod: GC

## 2022-07-25 PROCEDURE — 76775 US EXAM ABDO BACK WALL LIM: CPT | Mod: 26

## 2022-07-25 PROCEDURE — 99223 1ST HOSP IP/OBS HIGH 75: CPT

## 2022-07-25 PROCEDURE — 93010 ELECTROCARDIOGRAM REPORT: CPT

## 2022-07-25 PROCEDURE — 99232 SBSQ HOSP IP/OBS MODERATE 35: CPT | Mod: GC

## 2022-07-25 DEVICE — CATH BLLN EXTRACT DIST GUIDE WIRE 15MM 3LUM: Type: IMPLANTABLE DEVICE | Status: FUNCTIONAL

## 2022-07-25 DEVICE — GWIRE JAGTOME REVOLUTION RX 260CM/0.025IN: Type: IMPLANTABLE DEVICE | Status: FUNCTIONAL

## 2022-07-25 DEVICE — IMPLANTABLE DEVICE: Type: IMPLANTABLE DEVICE | Status: FUNCTIONAL

## 2022-07-25 RX ORDER — POTASSIUM CHLORIDE 20 MEQ
40 PACKET (EA) ORAL ONCE
Refills: 0 | Status: COMPLETED | OUTPATIENT
Start: 2022-07-25 | End: 2022-07-25

## 2022-07-25 RX ORDER — SODIUM CHLORIDE 9 MG/ML
500 INJECTION, SOLUTION INTRAVENOUS
Refills: 0 | Status: DISCONTINUED | OUTPATIENT
Start: 2022-07-25 | End: 2022-07-28

## 2022-07-25 RX ADMIN — PANTOPRAZOLE SODIUM 40 MILLIGRAM(S): 20 TABLET, DELAYED RELEASE ORAL at 05:16

## 2022-07-25 RX ADMIN — SIMETHICONE 80 MILLIGRAM(S): 80 TABLET, CHEWABLE ORAL at 18:30

## 2022-07-25 RX ADMIN — SENNA PLUS 2 TABLET(S): 8.6 TABLET ORAL at 21:58

## 2022-07-25 RX ADMIN — SIMETHICONE 80 MILLIGRAM(S): 80 TABLET, CHEWABLE ORAL at 05:16

## 2022-07-25 RX ADMIN — Medication 40 MILLIEQUIVALENT(S): at 10:35

## 2022-07-25 RX ADMIN — ATENOLOL 25 MILLIGRAM(S): 25 TABLET ORAL at 05:16

## 2022-07-25 RX ADMIN — Medication 3 MILLIGRAM(S): at 21:58

## 2022-07-25 RX ADMIN — HEPARIN SODIUM 5000 UNIT(S): 5000 INJECTION INTRAVENOUS; SUBCUTANEOUS at 21:58

## 2022-07-25 RX ADMIN — PANTOPRAZOLE SODIUM 40 MILLIGRAM(S): 20 TABLET, DELAYED RELEASE ORAL at 18:30

## 2022-07-25 RX ADMIN — SIMETHICONE 80 MILLIGRAM(S): 80 TABLET, CHEWABLE ORAL at 11:19

## 2022-07-25 NOTE — PROGRESS NOTE ADULT - PROBLEM SELECTOR PLAN 1
Pruritis, pale stools, jaundice, dark urine, intermittent RUQ pain  Poor appetite past few days, denies weight loss.   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   - CA 19-9 Level of 56 (7/21)   - Bcx NGTD, Ucx contaminated. Pruritis, pale stools, jaundice, dark urine, intermittent RUQ pain  Poor appetite past few days, denies weight loss.   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

## 2022-07-25 NOTE — PROGRESS NOTE ADULT - SUBJECTIVE AND OBJECTIVE BOX
CARDIOLOGY     PROGRESS  NOTE   ________________________________________________    CHIEF COMPLAINT:Patient is a 89y old  Female who presents with a chief complaint of Itching, orange urine, pale stools, RUQ abdominal pain (25 Jul 2022 07:30)  no complain.  	  REVIEW OF SYSTEMS:  CONSTITUTIONAL: No fever, weight loss, or fatigue  EYES: No eye pain, visual disturbances, or discharge  ENT:  No difficulty hearing, tinnitus, vertigo; No sinus or throat pain  NECK: No pain or stiffness  RESPIRATORY: No cough, wheezing, chills or hemoptysis; No Shortness of Breath  CARDIOVASCULAR: No chest pain, palpitations, passing out, dizziness, or leg swelling  GASTROINTESTINAL: No abdominal or epigastric pain. No nausea, vomiting, or hematemesis; No diarrhea or constipation. No melena or hematochezia.  GENITOURINARY: No dysuria, frequency, hematuria, or incontinence  NEUROLOGICAL: No headaches, memory loss, loss of strength, numbness, or tremors  SKIN: No itching, burning, rashes, or lesions   LYMPH Nodes: No enlarged glands  ENDOCRINE: No heat or cold intolerance; No hair loss  MUSCULOSKELETAL: No joint pain or swelling; No muscle, back, or extremity pain  PSYCHIATRIC: No depression, anxiety, mood swings, or difficulty sleeping  HEME/LYMPH: No easy bruising, or bleeding gums  ALLERGY AND IMMUNOLOGIC: No hives or eczema	    [ ] All others negative	  [ ] Unable to obtain    PHYSICAL EXAM:  T(C): 36.7 (07-25-22 @ 05:04), Max: 36.7 (07-24-22 @ 21:30)  HR: 53 (07-25-22 @ 05:15) (50 - 60)  BP: 137/71 (07-25-22 @ 05:04) (130/66 - 157/81)  RR: 18 (07-25-22 @ 05:04) (18 - 18)  SpO2: 96% (07-25-22 @ 05:04) (96% - 97%)  Wt(kg): --  I&O's Summary      Appearance: Normal	  HEENT:   Normal oral mucosa, PERRL, EOMI	  Lymphatic: No lymphadenopathy  Cardiovascular: Normal S1 S2, No JVD, + murmurs, No edema  Respiratory: rhonchi  Gastrointestinal:  Soft, Non-tender, + BS	  Skin: No rashes, No ecchymoses, No cyanosis	  Neurologic: Non-focal  Extremities: Normal range of motion, No clubbing, cyanosis or edema  Vascular: Peripheral pulses palpable 2+ bilaterally    MEDICATIONS  (STANDING):  ATENolol  Tablet 25 milliGRAM(s) Oral daily  heparin   Injectable 5000 Unit(s) SubCutaneous every 8 hours  melatonin 3 milliGRAM(s) Oral at bedtime  pantoprazole  Injectable 40 milliGRAM(s) IV Push every 12 hours  senna 2 Tablet(s) Oral at bedtime  simethicone 80 milliGRAM(s) Chew four times a day      TELEMETRY: 	    ECG:  	  RADIOLOGY:  OTHER: 	  	  LABS:	 	    CARDIAC MARKERS:                                8.5    11.22 )-----------( 349      ( 25 Jul 2022 07:36 )             27.1     07-25    140  |  100  |  45<H>  ----------------------------<  152<H>  3.3<L>   |  25  |  2.42<H>    Ca    9.5      25 Jul 2022 07:29  Phos  3.1     07-25  Mg     2.0     07-25    TPro  6.9  /  Alb  3.5  /  TBili  10.4<H>  /  DBili  x   /  AST  109<H>  /  ALT  143<H>  /  AlkPhos  748<H>  07-25    proBNP:   Lipid Profile:   HgA1c:   TSH:     < from: Transthoracic Echocardiogram (07.22.22 @ 12:10) >  Mitral Valve: Mitral annular calcification. Mild mitral  regurgitation.  Aortic Valve/Aorta: Calcified aortic valve with normal  opening. Mild aortic regurgitation.  Normal aortic root size.  Left Atrium: Moderately dilated left atrium.  Left Ventricle: Normal left ventricular internal dimensions  and wall thickness.  Normal left ventricular systolic function. No segmental  wall motion abnormalities.  Left ventricular filling pressure is elevated.  Right Heart: Normal right atrium. Normal right ventricular  size and function.  Normal tricuspid valve. Minimal tricuspid regurgitation.  Normal pulmonic valve. Mild pulmonic regurgitation.  Pericardium/Pleura: Normal pericardium with no pericardial  effusion.  Hemodynamic: Estimated right atrial pressure is normal.  Mild-moderate pulmonary hypertension. Estimated PASP 50  mmHg.  ------------------------------------------------------------------------  Conclusions:  Normal left ventricular systolic function. No segmental  wall motion abnormalities.  Mild-moderate pulmonary hypertension.      Assessment and plan  ---------------------------  89yof with HTN and HLD presented to outpatient doctor with 1 week of itching, orange urine, pale stools, and intermittent RUQ abdominal pain sent in by PCP for concern for malignancy. Patient reports the abdominal pain was more like a soreness, intermittent, 3/10 in severity. 1 episode of NBNB vomiting, patient attributed to reflux. Patient did report dark stool about 3 weeks prior to admission, denies PO iron use. Does report to be anemic, but denies dizziness, weakness, lightheadedness. Denies any history of kidney disease. Does report decreased appetite over past 4 days, decreased hydration as well. Patient had colonoscopy she thinks 4-5y ago, she believes also had endoscopy then, reports everything was normal. Did receive appropriate screening mammograms. Of note she did have muscles in a restaurant 10d ago, which is 3d prior to symptom onset. Denies fevers, but endorses chills. Denies hematemesis, hematuria, dysuria, urinary frequency.   pt with hx of htn , ckd stage 3, with RUQ abdominal pain.  pt denies of any cardiac hx , with LBBB ?old, echo with normal EF and no WMA with mod pulmonary htn  transfuse one unit of prbc, Lasix one dose post transfusion  pt is clear cardiac wise for the ERCP with mod risk  will follow up  dvt prophylaxis  pulmonary HTN ?etiology  replete K  will fu post procedure

## 2022-07-25 NOTE — PROGRESS NOTE ADULT - PROBLEM SELECTOR PLAN 3
Hgb of 7.5 (7/24)   - transfused 1 unit pRBC on 7/24 per Cardiology recs for preop optimization   - f/u post transfusion CBC  Hgb of 11 as of March, 2022  Hb on arrival 7.9, MCV 96  No obvious signs of bleeding, hemodynamically stable, FOBT negative.  Reported melena 3 weeks ago, no PO iron use.  - empiric protonix 40 IV BID for now, plan to transition to PO on 7/25.   - monitor Hb, transfuse to Hb > 7  - reticulocyte and ferritin levels in the AM.   - B12 folate pending   - FOBT negative Hgb of 7.5 (7/24)   - transfused 1 unit pRBC on 7/24 per Cardiology recs for preop optimization   - f/u post transfusion CBC  Hgb of 11 as of March, 2022  Hb on arrival 7.9, MCV 96  No obvious signs of bleeding, hemodynamically stable, FOBT negative.  Reported melena 3 weeks ago, no PO iron use.  - empiric protonix 40 IV BID for now, plan to transition to PO on 7/25.   - monitor Hb, transfuse to Hb >7  - reticulocyte and ferritin levels in AM.   - B12 folate pending   - FOBT negative

## 2022-07-25 NOTE — PROGRESS NOTE ADULT - SUBJECTIVE AND OBJECTIVE BOX
[facility] DIVISION OF KIDNEY DISEASES AND HYPERTENSION -- FOLLOW UP NOTE  --------------------------------------------------------------------------------  Chief Complaint:    24 hour events/subjective:  Patient seen and examined at the bedside, requesting to go down for her procedure (EUS/ERCP)  Cr: 2.94 -> 2.42  Urine Out put    PAST HISTORY  --------------------------------------------------------------------------------  No significant changes to PMH, PSH, FHx, SHx, unless otherwise noted    ALLERGIES & MEDICATIONS  --------------------------------------------------------------------------------  Allergies    penicillin (Unknown)  tetracaine (Unknown)    Intolerances      Standing Inpatient Medications  ATENolol  Tablet 25 milliGRAM(s) Oral daily  heparin   Injectable 5000 Unit(s) SubCutaneous every 8 hours  melatonin 3 milliGRAM(s) Oral at bedtime  pantoprazole  Injectable 40 milliGRAM(s) IV Push every 12 hours  senna 2 Tablet(s) Oral at bedtime  simethicone 80 milliGRAM(s) Chew four times a day    PRN Inpatient Medications  aluminum hydroxide/magnesium hydroxide/simethicone Suspension 30 milliLiter(s) Oral every 4 hours PRN  calamine/zinc oxide Lotion 1 Application(s) Topical four times a day PRN  cholestyramine Powder (Sugar-Free) 4 Gram(s) Oral two times a day PRN  zolpidem 5 milliGRAM(s) Oral at bedtime PRN      REVIEW OF SYSTEMS  --------------------------------------------------------------------------------  Gen: No fevers/chills  Skin: No rashes  Head/Eyes/Ears: Hard of hearing   Respiratory: No dyspnea, cough  CV: No chest pain  GI: No abdominal pain, diarrhea  : No dysuria, hematuria  MSK: No  edema  Heme: No easy bruising or bleeding  Psych: No significant depression      All other systems were reviewed and are negative, except as noted.    VITALS/PHYSICAL EXAM  --------------------------------------------------------------------------------  T(C): 36.7 (07-25-22 @ 05:04), Max: 36.7 (07-24-22 @ 21:30)  HR: 53 (07-25-22 @ 05:15) (50 - 60)  BP: 137/71 (07-25-22 @ 05:04) (130/66 - 157/81)  RR: 18 (07-25-22 @ 05:04) (18 - 18)  SpO2: 96% (07-25-22 @ 05:04) (96% - 97%)  Wt(kg): --          Physical Exam:  	Gen: NAD  	HEENT: heard of hearting   	Pulm: CTA B/L  	CV: S1S2  	Abd: Soft, +BS   	Ext: No LE edema B/L  	Neuro: Awake  	Skin: Warm and dry  	Vascular access:      LABS/STUDIES  --------------------------------------------------------------------------------              8.5    11.22 >-----------<  349      [07-25-22 @ 07:36]              27.1     140  |  100  |  45  ----------------------------<  152      [07-25-22 @ 07:29]  3.3   |  25  |  2.42        Ca     9.5     [07-25-22 @ 07:29]      Mg     2.0     [07-25-22 @ 07:29]      Phos  3.1     [07-25-22 @ 07:29]    TPro  6.9  /  Alb  3.5  /  TBili  10.4  /  DBili  x   /  AST  109  /  ALT  143  /  AlkPhos  748  [07-25-22 @ 07:29]              [07-25-22 @ 07:29]    Creatinine Trend:  SCr 2.42 [07-25 @ 07:29]  SCr 2.94 [07-24 @ 07:17]  SCr 2.92 [07-23 @ 21:12]  SCr 3.27 [07-23 @ 07:07]  SCr 3.37 [07-22 @ 00:29]    Urinalysis - [07-23-22 @ 10:26]      Color Yellow / Appearance Clear / SG 1.012 / pH 5.0      Gluc Negative / Ketone Negative  / Bili Small / Urobili Negative       Blood Trace / Protein Trace / Leuk Est Negative / Nitrite Negative      RBC 6 / WBC 3 / Hyaline 2 / Gran  / Sq Epi  / Non Sq Epi 1 / Bacteria Negative    Urine Creatinine 75      [07-23-22 @ 10:26]  Urine Protein 17      [07-23-22 @ 10:26]  Urine Sodium 57      [07-23-22 @ 10:26]  Urine Urea Nitrogen 378      [07-23-22 @ 10:26]  Urine Potassium 37      [07-23-22 @ 10:26]  Urine Osmolality 179      [07-20-22 @ 18:53]    Iron 41, TIBC 312, %sat 13      [07-20-22 @ 06:58]    HBsAg Nonreact      [07-19-22 @ 18:34]  HCV 0.11, Nonreact      [07-19-22 @ 18:34]

## 2022-07-25 NOTE — PRE PROCEDURE NOTE - PRE PROCEDURE EVALUATION
Attending Physician:     Dr. Steele                     Procedure:   ERCP    Indication for Procedure:   Concern for Malignancy  ________________________________________________________  PAST MEDICAL & SURGICAL HISTORY:    HLD (hyperlipidemia)  GERD (gastroesophageal reflux disease)  HTN (hypertension)    H/O:  x2      ALLERGIES:  penicillin (Unknown)  tetracaine (Unknown)    HOME MEDICATIONS:  atenolol 25 mg oral tablet: 1 tab(s) orally once a day  hydroCHLOROthiazide 25 mg oral tablet: 1 tab(s) orally once a day  Lipitor 20 mg oral tablet: 1 tab(s) orally once a day  losartan 100 mg oral tablet: 1 tab(s) orally once a day  Melatonin 5 mg oral tablet: 1 tab(s) orally once a day (at bedtime)  omeprazole 20 mg oral delayed release capsule: 1 cap(s) orally once a day  zolpidem 5 mg oral tablet: 1 tab(s) orally once a day (at bedtime)    AICD/PPM: [ ] yes   [X ] no    PERTINENT LAB DATA:                        8.5    11.22 )-----------( 349      ( 2022 07:36 )             27.1         140  |  100  |  45<H>  ----------------------------<  152<H>  3.3<L>   |  25  |  2.42<H>    Ca    9.5      2022 07:29  Phos  3.1     -  Mg     2.0       TPro  6.9  /  Alb  3.5  /  TBili  10.4<H>  /  DBili  x   /  AST  109<H>  /  ALT  143<H>  /  AlkPhos  748<H>      PHYSICAL EXAMINATION:    T(C): 36.8  HR: 55  BP: 131/70  RR: 19  SpO2: 95%    Constitutional: NAD    Neck:  No JVD  Respiratory: CTAB/L  Cardiovascular: S1 and S2  Gastrointestinal: BS+, soft, NT/ND  Extremities: No peripheral edema  Neurological: A/O x 4      COMMENTS:    The patient is a suitable candidate for the planned procedure unless box checked [ ]  No, explain:

## 2022-07-25 NOTE — PACU DISCHARGE NOTE - THE ANESTHESIA ORDERS USED IN THE PACU ORDER SET WILL BE DISCONTINUED UPON TRANSFER OF THIS PATIENT
Problem: BIRTH - VAGINAL/ SECTION  Goal: Fetal and maternal status remain reassuring during the birth process  Description: INTERVENTIONS:  - Monitor vital signs  - Monitor fetal heart rate  - Monitor uterine activity  - Monitor labor progression Statement Selected

## 2022-07-25 NOTE — CONSULT NOTE ADULT - CONSULT REASON
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

## 2022-07-25 NOTE — PROGRESS NOTE ADULT - SUBJECTIVE AND OBJECTIVE BOX
***************************************************************  Brooke Bruce MD (PGY-1)  Internal Medicine  Pager: [phone]  ***************************************************************    PROGRESS NOTE:     Patient is a 89y old  Female who presents with a chief complaint of Itching, orange urine, pale stools, RUQ abdominal pain (2022 12:07)      SUBJECTIVE / OVERNIGHT EVENTS: Patient seen and examined at bedside. No acute overnight events. This morning, the patient is comfortable and doing well. No acute complaints. Denies fevers, chills, N/V/D, chest pain, SOB, abdominal pain.    MEDICATIONS  (STANDING):  ATENolol  Tablet 25 milliGRAM(s) Oral daily  heparin   Injectable 5000 Unit(s) SubCutaneous every 8 hours  melatonin 3 milliGRAM(s) Oral at bedtime  pantoprazole  Injectable 40 milliGRAM(s) IV Push every 12 hours  senna 2 Tablet(s) Oral at bedtime  simethicone 80 milliGRAM(s) Chew four times a day    MEDICATIONS  (PRN):  aluminum hydroxide/magnesium hydroxide/simethicone Suspension 30 milliLiter(s) Oral every 4 hours PRN Dyspepsia  calamine/zinc oxide Lotion 1 Application(s) Topical four times a day PRN Itching  cholestyramine Powder (Sugar-Free) 4 Gram(s) Oral two times a day PRN itching  zolpidem 5 milliGRAM(s) Oral at bedtime PRN Insomnia      CAPILLARY BLOOD GLUCOSE        I&O's Summary      PHYSICAL EXAM:  Vital Signs Last 24 Hrs  T(C): 36.7 (2022 05:04), Max: 36.7 (2022 21:30)  T(F): 98 (2022 05:04), Max: 98 (2022 21:30)  HR: 53 (2022 05:15) (50 - 60)  BP: 137/71 (2022 05:04) (130/66 - 157/81)  BP(mean): --  RR: 18 (2022 05:04) (18 - 18)  SpO2: 96% (2022 05:04) (96% - 97%)    Parameters below as of 2022 05:04  Patient On (Oxygen Delivery Method): nasal cannula        GENERAL: NAD, lying in bed comfortably  HEAD: Atraumatic, normocephalic  EYES: EOMI, PERRLA, conjunctiva and sclera clear  ENT: Moist mucous membranes  NECK: Supple, no JVD  HEART: S1, S2, Regular rate and rhythm, no murmurs, rubs, or gallops  LUNGS: Unlabored respirations, clear to auscultation bilaterally, no crackles, wheezing, or rhonchi  ABDOMEN: Soft, nontender, nondistended, +BS  EXTREMITIES: 2+ peripheral pulses bilaterally. No clubbing, cyanosis, or edema  NERVOUS SYSTEM:  A&Ox3, no focal deficits   SKIN: No rashes or lesions    LABS:                        8.3    11.19 )-----------( 350      ( 2022 17:58 )             25.2     07-24    140  |  102  |  43<H>  ----------------------------<  172<H>  3.8   |  23  |  2.94<H>    Ca    9.2      2022 07:17  Phos  3.2     -24  Mg     2.0     -24    TPro  6.4  /  Alb  3.3  /  TBili  7.8<H>  /  DBili  x   /  AST  108<H>  /  ALT  139<H>  /  AlkPhos  665<H>  07-24          Urinalysis Basic - ( 2022 10:26 )    Color: Yellow / Appearance: Clear / S.012 / pH: x  Gluc: x / Ketone: Negative  / Bili: Small / Urobili: Negative   Blood: x / Protein: Trace / Nitrite: Negative   Leuk Esterase: Negative / RBC: 6 /hpf / WBC 3 /HPF   Sq Epi: x / Non Sq Epi: 1 /hpf / Bacteria: Negative          RADIOLOGY & ADDITIONAL TESTS:  Results Reviewed:   Imaging Personally Reviewed:  Electrocardiogram Personally Reviewed:    COORDINATION OF CARE:  Care Discussed with Consultants/Other Providers [Y/N]:  Prior or Outpatient Records Reviewed [Y/N]:   ***************************************************************  Brooke Bruce MD (PGY-1)  Internal Medicine  Pager: [phone]  ***************************************************************    PROGRESS NOTE:     Patient is a 89y old  Female who presents with a chief complaint of Itching, orange urine, pale stools, RUQ abdominal pain (2022 12:07)      SUBJECTIVE / OVERNIGHT EVENTS: NAEO. Pt c/o diffuse abdominal discomfort with SOB. Patient notes she had BM last week and denies fevers, chills, nausea, vomiting.    MEDICATIONS  (STANDING):  ATENolol  Tablet 25 milliGRAM(s) Oral daily  heparin   Injectable 5000 Unit(s) SubCutaneous every 8 hours  melatonin 3 milliGRAM(s) Oral at bedtime  pantoprazole  Injectable 40 milliGRAM(s) IV Push every 12 hours  senna 2 Tablet(s) Oral at bedtime  simethicone 80 milliGRAM(s) Chew four times a day    MEDICATIONS  (PRN):  aluminum hydroxide/magnesium hydroxide/simethicone Suspension 30 milliLiter(s) Oral every 4 hours PRN Dyspepsia  calamine/zinc oxide Lotion 1 Application(s) Topical four times a day PRN Itching  cholestyramine Powder (Sugar-Free) 4 Gram(s) Oral two times a day PRN itching  zolpidem 5 milliGRAM(s) Oral at bedtime PRN Insomnia      CAPILLARY BLOOD GLUCOSE        I&O's Summary      PHYSICAL EXAM:  Vital Signs Last 24 Hrs  T(C): 36.7 (2022 05:04), Max: 36.7 (2022 21:30)  T(F): 98 (2022 05:04), Max: 98 (2022 21:30)  HR: 53 (2022 05:15) (50 - 60)  BP: 137/71 (2022 05:04) (130/66 - 157/81)  BP(mean): --  RR: 18 (2022 05:04) (18 - 18)  SpO2: 96% (2022 05:04) (96% - 97%)    Parameters below as of 2022 05:04  Patient On (Oxygen Delivery Method): nasal cannula        Constitutional: Moderate distress  HEENT: non-icteric sclera, no conjunctival pallor appreciated.   Respiratory: CTA b/l, no labored breathing  Cardiovascular: RRR, normal S1S2, no M/R/G  Gastrointestinal: soft, NTND, no masses palpable  Extremities: Warm, well perfused, no edema appreciated in the b/l lower extremities.   Neurological: AAOx3  Skin: Normal temperature, warm, dry    LABS:                        8.3    11.19 )-----------( 350      ( 2022 17:58 )             25.2     07-24    140  |  102  |  43<H>  ----------------------------<  172<H>  3.8   |  23  |  2.94<H>    Ca    9.2      2022 07:17  Phos  3.2     -  Mg     2.0     -24    TPro  6.4  /  Alb  3.3  /  TBili  7.8<H>  /  DBili  x   /  AST  108<H>  /  ALT  139<H>  /  AlkPhos  665<H>  07-24          Urinalysis Basic - ( 2022 10:26 )    Color: Yellow / Appearance: Clear / S.012 / pH: x  Gluc: x / Ketone: Negative  / Bili: Small / Urobili: Negative   Blood: x / Protein: Trace / Nitrite: Negative   Leuk Esterase: Negative / RBC: 6 /hpf / WBC 3 /HPF   Sq Epi: x / Non Sq Epi: 1 /hpf / Bacteria: Negative          RADIOLOGY & ADDITIONAL TESTS:  Results Reviewed:   Imaging Personally Reviewed:  Electrocardiogram Personally Reviewed:    COORDINATION OF CARE:  Care Discussed with Consultants/Other Providers [Y/N]:  Prior or Outpatient Records Reviewed [Y/N]:

## 2022-07-25 NOTE — PROGRESS NOTE ADULT - PROBLEM SELECTOR PLAN 2
- Creatinine 2.9 (7/24) this AM, current downtrend but still elevated from from admission level of 2.016  Previous creatinine of Cr 1.58 (2018) patient denies Hx CKD however.   Of note has 5cm b/l renal cysts seen on CTAP.   - less likely contrast nephropathy due to <48 hour time window since receiving last contrast.   - avoid nephrotoxins  - hydrochlorothiazide 25 mg daily and losartan 100 mg daily currently held given CAMRON on CKD.  - FENA of 1.8% (7/23) indicating intrinsic renal injury  - renal US per renal recommendations. - Creatinine 2.9 (7/24) this AM, current downtrend but still elevated from admission level of 2.016  Previous creatinine of Cr 1.58 (2018) patient denies Hx CKD however. Of note has 5cm b/l renal cysts seen on CTAP.   - less likely contrast nephropathy due to <48 hour time window since receiving last contrast.   - avoid nephrotoxins  - hydrochlorothiazide 25 mg daily and losartan 100 mg daily currently held given CAMRON on CKD  - FENA of 1.8% (7/23) indicating intrinsic renal injury  - renal US per renal recommendations

## 2022-07-25 NOTE — PROGRESS NOTE ADULT - ASSESSMENT
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 Unclear etiology, could be multifactorial from receiving contrast and diuretics   Uprot/cr ratio is normal with UA significant for Large LE (sterile pyuria)       Acute kidney injury superimposed on CKD.   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 Unclear etiology,   Pt. with CAMRON vs CAMRON on CKD in the setting of obstructive jaundice, use of diuretics, IV contrast and decreased PO intake. UA with few RBCs (improving) and spot urine TP/Cr 0.2.  Pt. non oliguric, and Scr has been improving.   Noted to have bilateral densities, ?cysts on bilateral kidneys on CT abdomen.     Acute kidney injury superimposed on CKD.   []Unknown baseline creatinine function  [] Creatinine now downtrending to 2.42 from peak of 3.29   [] UA: no hematuria  and Uprot/cr: 0.2   [] Can consider renal ultrasound to establish baseline size and characteristic   [] Please continue to monitor renal function panel as well as urine out put  [] Please avoid nephrotoxic medications   [] Patient euvolemic without need for fluids or diuresis

## 2022-07-25 NOTE — PROGRESS NOTE ADULT - ASSESSMENT
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

## 2022-07-25 NOTE — CONSULT NOTE ADULT - SUBJECTIVE AND OBJECTIVE BOX
General Surgery Consult  Consulting surgical team: Surgical oncology  Consulting attending: Hai Kendrick MD    HPI:  89F with HTN and HLD presented to outpatient doctor with 1 week of itching, orange urine, pale stools, and intermittent RUQ abdominal pain sent in by PCP for concern for malignancy. Patient reports the abdominal pain was more like a soreness, intermittent, 3/10 in severity. 1 episode of NBNB vomiting, patient attributed to reflux. Patient did report dark stool about 3 weeks prior to admission, denies PO iron use. Does report to be anemic, but denies dizziness, weakness, lightheadedness. Denies any history of kidney disease. Does report decreased appetite over past 4 days, decreased hydration as well. Patient had colonoscopy she thinks 4-5y ago, she believes also had endoscopy then, reports everything was normal. Did receive appropriate screening mammograms. Of note she did have muscles in a restaurant 10d ago, which is 3d prior to symptom onset. Denies fevers, but endorses chills. Denies hematemesis, hematuria, dysuria, urinary frequency.     Patient presented with elevated LFTs, Tbili and Alk Phos  Admitted to medicine for further evaluation and managment  GI consulted and MRCP performed on 22 shows Biliary duct dilation with abrupt cut off near the ampulla, where there   is an associated 2.4 cm periampullary lesion. Mild prominence of the pancreatic duct to 4 mm in the head. 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   Distended gallbladder with sludge. Mild nonspecific edematous gallbladder   wall thickening.    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 Biopsied. ERCP revealed diffuse biliary ductal dilation s/p placement of a 10 mm x 6   cm fully covered self expanding biliary stent.    Patient CEA WNL, CA- 19.9 56,  WNL,     Surgical oncology consulted for evaluation of duodenal mass    PAST MEDICAL HISTORY:  HLD (hyperlipidemia)    GERD (gastroesophageal reflux disease)    HTN (hypertension)        PAST SURGICAL HISTORY:  H/O:         MEDICATIONS:  aluminum hydroxide/magnesium hydroxide/simethicone Suspension 30 milliLiter(s) Oral every 4 hours PRN  ATENolol  Tablet 25 milliGRAM(s) Oral daily  calamine/zinc oxide Lotion 1 Application(s) Topical four times a day PRN  cholestyramine Powder (Sugar-Free) 4 Gram(s) Oral two times a day PRN  heparin   Injectable 5000 Unit(s) SubCutaneous every 8 hours  lactated ringers. 500 milliLiter(s) IV Continuous <Continuous>  melatonin 3 milliGRAM(s) Oral at bedtime  pantoprazole  Injectable 40 milliGRAM(s) IV Push every 12 hours  senna 2 Tablet(s) Oral at bedtime  simethicone 80 milliGRAM(s) Chew four times a day  zolpidem 5 milliGRAM(s) Oral at bedtime PRN      ALLERGIES:  penicillin (Unknown)  tetracaine (Unknown)      VITALS & I/Os:  Vital Signs Last 24 Hrs  T(C): 36.3 (2022 18:29), Max: 36.8 (2022 12:24)  T(F): 97.3 (2022 18:29), Max: 98.2 (2022 12:24)  HR: 48 (2022 18:29) (46 - 60)  BP: 168/70 (2022 18:29) (130/66 - 168/70)  BP(mean): --  RR: 18 (2022 18:29) (12 - 21)  SpO2: 95% (2022 18:29) (92% - 96%)    Parameters below as of 2022 17:37  Patient On (Oxygen Delivery Method): nasal cannula  O2 Flow (L/min): 2      I&O's Summary      PHYSICAL EXAM:  General: No acute distress  Respiratory: Nonlabored  Cardiovascular: RRR  Abdominal: Soft, nondistended, nontender. No rebound or guarding. No organomegaly, no palpable mass.  Extremities: Warm    LABS:                        8.5    11.22 )-----------( 349      ( 2022 07:36 )             27.1     07-25    140  |  100  |  45<H>  ----------------------------<  152<H>  3.3<L>   |  25  |  2.42<H>    Ca    9.5      2022 07:29  Phos  3.1     07-25  Mg     2.0     07-25    TPro  6.9  /  Alb  3.5  /  TBili  10.4<H>  /  DBili  x   /  AST  109<H>  /  ALT  143<H>  /  AlkPhos  748<H>      Lactate:                  IMAGING:

## 2022-07-25 NOTE — CONSULT NOTE ADULT - ASSESSMENT
89F PMHx of HTN, HLD presents withe one week of intermittment RUQ pain and obstructive jaundice. Inpatient workup with MRCP and ERCP significant for possible malignant duodenal mass. Ca 19-9 elevated to 56. Surgical oncology consulted for surgical evaluation of duodenal mass.     Plan:  Surg onc to follow under Dr. Hai Kendrick  Please obtain CT Chest non-contrast to complete malignancy workup  f/u ERCP biopsy results  begin medical and cardiac optimization for possible OR  OR planning for possible [facility] pending biopsy results  Will discuss further with primary team and GI   Thank you the interesting consult. Surg onc to continue to follow    Plan discussed with and approved by Dr. Aroldo David MD PGY-2  Red Team Surgery   [id number]

## 2022-07-26 PROBLEM — I10 ESSENTIAL (PRIMARY) HYPERTENSION: Chronic | Status: ACTIVE | Noted: 2022-07-19

## 2022-07-26 PROBLEM — K21.9 GASTRO-ESOPHAGEAL REFLUX DISEASE WITHOUT ESOPHAGITIS: Chronic | Status: ACTIVE | Noted: 2022-07-19

## 2022-07-26 PROBLEM — E78.5 HYPERLIPIDEMIA, UNSPECIFIED: Chronic | Status: ACTIVE | Noted: 2022-07-19

## 2022-07-26 LAB
ALBUMIN SERPL ELPH-MCNC: 3.1 G/DL — LOW (ref 3.3–5)
ALP SERPL-CCNC: 722 U/L — HIGH (ref 40–120)
ALT FLD-CCNC: 120 U/L — HIGH (ref 10–45)
ANION GAP SERPL CALC-SCNC: 15 MMOL/L — SIGNIFICANT CHANGE UP (ref 5–17)
AST SERPL-CCNC: 81 U/L — HIGH (ref 10–40)
BASOPHILS # BLD AUTO: 0.02 K/UL — SIGNIFICANT CHANGE UP (ref 0–0.2)
BASOPHILS NFR BLD AUTO: 0.2 % — SIGNIFICANT CHANGE UP (ref 0–2)
BILIRUB DIRECT SERPL-MCNC: 6.1 MG/DL — HIGH (ref 0–0.3)
BILIRUB INDIRECT FLD-MCNC: 1.9 MG/DL — HIGH (ref 0.2–1)
BILIRUB SERPL-MCNC: 8 MG/DL — HIGH (ref 0.2–1.2)
BILIRUB SERPL-MCNC: 8.3 MG/DL — HIGH (ref 0.2–1.2)
BUN SERPL-MCNC: 54 MG/DL — HIGH (ref 7–23)
CALCIUM SERPL-MCNC: 9.3 MG/DL — SIGNIFICANT CHANGE UP (ref 8.4–10.5)
CHLORIDE SERPL-SCNC: 102 MMOL/L — SIGNIFICANT CHANGE UP (ref 96–108)
CO2 SERPL-SCNC: 24 MMOL/L — SIGNIFICANT CHANGE UP (ref 22–31)
CREAT SERPL-MCNC: 2.51 MG/DL — HIGH (ref 0.5–1.3)
EGFR: 18 ML/MIN/1.73M2 — LOW
EOSINOPHIL # BLD AUTO: 0.03 K/UL — SIGNIFICANT CHANGE UP (ref 0–0.5)
EOSINOPHIL NFR BLD AUTO: 0.4 % — SIGNIFICANT CHANGE UP (ref 0–6)
GLUCOSE SERPL-MCNC: 157 MG/DL — HIGH (ref 70–99)
HCT VFR BLD CALC: 25.8 % — LOW (ref 34.5–45)
HGB BLD-MCNC: 8.1 G/DL — LOW (ref 11.5–15.5)
IMM GRANULOCYTES NFR BLD AUTO: 0.6 % — SIGNIFICANT CHANGE UP (ref 0–1.5)
LYMPHOCYTES # BLD AUTO: 0.56 K/UL — LOW (ref 1–3.3)
LYMPHOCYTES # BLD AUTO: 6.9 % — LOW (ref 13–44)
MAGNESIUM SERPL-MCNC: 2.1 MG/DL — SIGNIFICANT CHANGE UP (ref 1.6–2.6)
MCHC RBC-ENTMCNC: 28.4 PG — SIGNIFICANT CHANGE UP (ref 27–34)
MCHC RBC-ENTMCNC: 31.4 GM/DL — LOW (ref 32–36)
MCV RBC AUTO: 90.5 FL — SIGNIFICANT CHANGE UP (ref 80–100)
MONOCYTES # BLD AUTO: 0.56 K/UL — SIGNIFICANT CHANGE UP (ref 0–0.9)
MONOCYTES NFR BLD AUTO: 6.9 % — SIGNIFICANT CHANGE UP (ref 2–14)
NEUTROPHILS # BLD AUTO: 6.9 K/UL — SIGNIFICANT CHANGE UP (ref 1.8–7.4)
NEUTROPHILS NFR BLD AUTO: 85 % — HIGH (ref 43–77)
NRBC # BLD: 0 /100 WBCS — SIGNIFICANT CHANGE UP (ref 0–0)
PHOSPHATE SERPL-MCNC: 3.6 MG/DL — SIGNIFICANT CHANGE UP (ref 2.5–4.5)
PLATELET # BLD AUTO: 321 K/UL — SIGNIFICANT CHANGE UP (ref 150–400)
POTASSIUM SERPL-MCNC: 3.5 MMOL/L — SIGNIFICANT CHANGE UP (ref 3.5–5.3)
POTASSIUM SERPL-SCNC: 3.5 MMOL/L — SIGNIFICANT CHANGE UP (ref 3.5–5.3)
PROT SERPL-MCNC: 6.3 G/DL — SIGNIFICANT CHANGE UP (ref 6–8.3)
RBC # BLD: 2.85 M/UL — LOW (ref 3.8–5.2)
RBC # FLD: 17.9 % — HIGH (ref 10.3–14.5)
SODIUM SERPL-SCNC: 141 MMOL/L — SIGNIFICANT CHANGE UP (ref 135–145)
WBC # BLD: 8.12 K/UL — SIGNIFICANT CHANGE UP (ref 3.8–10.5)
WBC # FLD AUTO: 8.12 K/UL — SIGNIFICANT CHANGE UP (ref 3.8–10.5)

## 2022-07-26 PROCEDURE — 99232 SBSQ HOSP IP/OBS MODERATE 35: CPT | Mod: GC

## 2022-07-26 PROCEDURE — 99223 1ST HOSP IP/OBS HIGH 75: CPT | Mod: GC

## 2022-07-26 PROCEDURE — 71250 CT THORAX DX C-: CPT | Mod: 26

## 2022-07-26 PROCEDURE — 99232 SBSQ HOSP IP/OBS MODERATE 35: CPT

## 2022-07-26 RX ORDER — CHOLESTYRAMINE 4 G/9G
4 POWDER, FOR SUSPENSION ORAL
Qty: 0 | Refills: 0 | DISCHARGE
Start: 2022-07-26

## 2022-07-26 RX ORDER — SIMETHICONE 80 MG/1
1 TABLET, CHEWABLE ORAL
Qty: 120 | Refills: 0
Start: 2022-07-26 | End: 2022-08-24

## 2022-07-26 RX ORDER — PANTOPRAZOLE SODIUM 20 MG/1
40 TABLET, DELAYED RELEASE ORAL
Refills: 0 | Status: DISCONTINUED | OUTPATIENT
Start: 2022-07-26 | End: 2022-07-28

## 2022-07-26 RX ORDER — CHOLESTYRAMINE 4 G/9G
4 POWDER, FOR SUSPENSION ORAL
Qty: 480 | Refills: 0
Start: 2022-07-26 | End: 2022-08-24

## 2022-07-26 RX ADMIN — ZOLPIDEM TARTRATE 5 MILLIGRAM(S): 10 TABLET ORAL at 01:20

## 2022-07-26 RX ADMIN — HEPARIN SODIUM 5000 UNIT(S): 5000 INJECTION INTRAVENOUS; SUBCUTANEOUS at 05:12

## 2022-07-26 RX ADMIN — CHOLESTYRAMINE 4 GRAM(S): 4 POWDER, FOR SUSPENSION ORAL at 12:03

## 2022-07-26 RX ADMIN — SIMETHICONE 80 MILLIGRAM(S): 80 TABLET, CHEWABLE ORAL at 23:14

## 2022-07-26 RX ADMIN — SENNA PLUS 2 TABLET(S): 8.6 TABLET ORAL at 21:41

## 2022-07-26 RX ADMIN — SIMETHICONE 80 MILLIGRAM(S): 80 TABLET, CHEWABLE ORAL at 05:12

## 2022-07-26 RX ADMIN — SIMETHICONE 80 MILLIGRAM(S): 80 TABLET, CHEWABLE ORAL at 00:24

## 2022-07-26 RX ADMIN — Medication 3 MILLIGRAM(S): at 21:40

## 2022-07-26 RX ADMIN — HEPARIN SODIUM 5000 UNIT(S): 5000 INJECTION INTRAVENOUS; SUBCUTANEOUS at 13:48

## 2022-07-26 RX ADMIN — CHOLESTYRAMINE 4 GRAM(S): 4 POWDER, FOR SUSPENSION ORAL at 03:01

## 2022-07-26 RX ADMIN — ZOLPIDEM TARTRATE 5 MILLIGRAM(S): 10 TABLET ORAL at 23:14

## 2022-07-26 RX ADMIN — ATENOLOL 25 MILLIGRAM(S): 25 TABLET ORAL at 05:12

## 2022-07-26 RX ADMIN — PANTOPRAZOLE SODIUM 40 MILLIGRAM(S): 20 TABLET, DELAYED RELEASE ORAL at 05:13

## 2022-07-26 RX ADMIN — HEPARIN SODIUM 5000 UNIT(S): 5000 INJECTION INTRAVENOUS; SUBCUTANEOUS at 21:40

## 2022-07-26 NOTE — PROGRESS NOTE ADULT - SUBJECTIVE AND OBJECTIVE BOX
***************************************************************  Brooke Bruce MD (PGY-1)  Internal Medicine  Pager: [phone]  ***************************************************************    PROGRESS NOTE:     Patient is a 89y old  Female who presents with a chief complaint of Itching, orange urine, pale stools, RUQ abdominal pain (25 Jul 2022 20:18)      SUBJECTIVE / OVERNIGHT EVENTS: Patient seen and examined at bedside. No acute overnight events. This morning, the patient is comfortable and doing well. No acute complaints. Denies fevers, chills, N/V/D, chest pain, SOB, abdominal pain.    MEDICATIONS  (STANDING):  ATENolol  Tablet 25 milliGRAM(s) Oral daily  heparin   Injectable 5000 Unit(s) SubCutaneous every 8 hours  lactated ringers. 500 milliLiter(s) (30 mL/Hr) IV Continuous <Continuous>  melatonin 3 milliGRAM(s) Oral at bedtime  pantoprazole  Injectable 40 milliGRAM(s) IV Push every 12 hours  senna 2 Tablet(s) Oral at bedtime  simethicone 80 milliGRAM(s) Chew four times a day    MEDICATIONS  (PRN):  aluminum hydroxide/magnesium hydroxide/simethicone Suspension 30 milliLiter(s) Oral every 4 hours PRN Dyspepsia  calamine/zinc oxide Lotion 1 Application(s) Topical four times a day PRN Itching  cholestyramine Powder (Sugar-Free) 4 Gram(s) Oral two times a day PRN itching  zolpidem 5 milliGRAM(s) Oral at bedtime PRN Insomnia      CAPILLARY BLOOD GLUCOSE        I&O's Summary      PHYSICAL EXAM:  Vital Signs Last 24 Hrs  T(C): 36.4 (26 Jul 2022 03:24), Max: 36.8 (25 Jul 2022 12:24)  T(F): 97.6 (26 Jul 2022 03:24), Max: 98.2 (25 Jul 2022 12:24)  HR: 56 (26 Jul 2022 03:24) (46 - 60)  BP: 157/76 (26 Jul 2022 03:24) (125/72 - 168/70)  BP(mean): --  RR: 21 (26 Jul 2022 03:24) (12 - 21)  SpO2: 94% (26 Jul 2022 03:24) (92% - 96%)    Parameters below as of 26 Jul 2022 03:24  Patient On (Oxygen Delivery Method): nasal cannula  O2 Flow (L/min): 2      GENERAL: NAD, lying in bed comfortably  HEAD: Atraumatic, normocephalic  EYES: EOMI, PERRLA, conjunctiva and sclera clear  ENT: Moist mucous membranes  NECK: Supple, no JVD  HEART: S1, S2, Regular rate and rhythm, no murmurs, rubs, or gallops  LUNGS: Unlabored respirations, clear to auscultation bilaterally, no crackles, wheezing, or rhonchi  ABDOMEN: Soft, nontender, nondistended, +BS  EXTREMITIES: 2+ peripheral pulses bilaterally. No clubbing, cyanosis, or edema  NERVOUS SYSTEM:  A&Ox3, no focal deficits   SKIN: No rashes or lesions    LABS:                        8.5    11.22 )-----------( 349      ( 25 Jul 2022 07:36 )             27.1     07-25    140  |  100  |  45<H>  ----------------------------<  152<H>  3.3<L>   |  25  |  2.42<H>    Ca    9.5      25 Jul 2022 07:29  Phos  3.1     07-25  Mg     2.0     07-25    TPro  6.9  /  Alb  3.5  /  TBili  10.4<H>  /  DBili  x   /  AST  109<H>  /  ALT  143<H>  /  AlkPhos  748<H>  07-25                RADIOLOGY & ADDITIONAL TESTS:  Results Reviewed:   Imaging Personally Reviewed:  Electrocardiogram Personally Reviewed:    COORDINATION OF CARE:  Care Discussed with Consultants/Other Providers [Y/N]:  Prior or Outpatient Records Reviewed [Y/N]:   ***************************************************************  Brooke Bruce MD (PGY-1)  Internal Medicine  Pager: [phone]  ***************************************************************    PROGRESS NOTE:     Patient is a 89y old  Female who presents with a chief complaint of Itching, orange urine, pale stools, RUQ abdominal pain (25 Jul 2022 20:18)      SUBJECTIVE / OVERNIGHT EVENTS: Patient seen and examined at bedside. Per nursing, pt c/o cough and wheeze last night and was satting 94% on 2LNC. This morning, the patient is comfortable. No acute complaints. Denies fevers, chills, N/V/D, chest pain, SOB, abdominal pain.    MEDICATIONS  (STANDING):  ATENolol  Tablet 25 milliGRAM(s) Oral daily  heparin   Injectable 5000 Unit(s) SubCutaneous every 8 hours  lactated ringers. 500 milliLiter(s) (30 mL/Hr) IV Continuous <Continuous>  melatonin 3 milliGRAM(s) Oral at bedtime  pantoprazole  Injectable 40 milliGRAM(s) IV Push every 12 hours  senna 2 Tablet(s) Oral at bedtime  simethicone 80 milliGRAM(s) Chew four times a day    MEDICATIONS  (PRN):  aluminum hydroxide/magnesium hydroxide/simethicone Suspension 30 milliLiter(s) Oral every 4 hours PRN Dyspepsia  calamine/zinc oxide Lotion 1 Application(s) Topical four times a day PRN Itching  cholestyramine Powder (Sugar-Free) 4 Gram(s) Oral two times a day PRN itching  zolpidem 5 milliGRAM(s) Oral at bedtime PRN Insomnia      CAPILLARY BLOOD GLUCOSE        I&O's Summary      PHYSICAL EXAM:  Vital Signs Last 24 Hrs  T(C): 36.4 (26 Jul 2022 03:24), Max: 36.8 (25 Jul 2022 12:24)  T(F): 97.6 (26 Jul 2022 03:24), Max: 98.2 (25 Jul 2022 12:24)  HR: 56 (26 Jul 2022 03:24) (46 - 60)  BP: 157/76 (26 Jul 2022 03:24) (125/72 - 168/70)  BP(mean): --  RR: 21 (26 Jul 2022 03:24) (12 - 21)  SpO2: 94% (26 Jul 2022 03:24) (92% - 96%)    Parameters below as of 26 Jul 2022 03:24  Patient On (Oxygen Delivery Method): nasal cannula  O2 Flow (L/min): 2      GENERAL: NAD, lying in bed comfortably  HEAD: Atraumatic, normocephalic  EYES: EOMI, PERRLA, conjunctiva and sclera clear  ENT: Moist mucous membranes  NECK: Supple, no JVD  HEART: S1, S2, Regular rate and rhythm, no murmurs, rubs, or gallops  LUNGS: Unlabored respirations, clear to auscultation bilaterally, no crackles, wheezing, or rhonchi  ABDOMEN: Soft, nontender, nondistended, +BS  EXTREMITIES: 2+ peripheral pulses bilaterally. No clubbing, cyanosis, or edema  NERVOUS SYSTEM:  A&Ox3, no focal deficits   SKIN: No rashes or lesions    LABS:                        8.5    11.22 )-----------( 349      ( 25 Jul 2022 07:36 )             27.1     07-25    140  |  100  |  45<H>  ----------------------------<  152<H>  3.3<L>   |  25  |  2.42<H>    Ca    9.5      25 Jul 2022 07:29  Phos  3.1     07-25  Mg     2.0     07-25    TPro  6.9  /  Alb  3.5  /  TBili  10.4<H>  /  DBili  x   /  AST  109<H>  /  ALT  143<H>  /  AlkPhos  748<H>  07-25                RADIOLOGY & ADDITIONAL TESTS:  Results Reviewed:   Imaging Personally Reviewed:  Electrocardiogram Personally Reviewed:    COORDINATION OF CARE:  Care Discussed with Consultants/Other Providers [Y/N]:  Prior or Outpatient Records Reviewed [Y/N]:   ***************************************************************  Brooke Bruce MD (PGY-1)  Internal Medicine  Pager: [phone]  ***************************************************************    PROGRESS NOTE:     Patient is a 89y old  Female who presents with a chief complaint of Itching, orange urine, pale stools, RUQ abdominal pain (25 Jul 2022 20:18)      SUBJECTIVE / OVERNIGHT EVENTS: Patient seen and examined at bedside. Per nursing, pt c/o cough and wheeze last night and was satting 94% on 2LNC. This morning, the patient is slightly anxious. No acute complaints. Denies fevers, chills, N/V, HA, chest pain, and abdominal pain.    MEDICATIONS  (STANDING):  ATENolol  Tablet 25 milliGRAM(s) Oral daily  heparin   Injectable 5000 Unit(s) SubCutaneous every 8 hours  lactated ringers. 500 milliLiter(s) (30 mL/Hr) IV Continuous <Continuous>  melatonin 3 milliGRAM(s) Oral at bedtime  pantoprazole  Injectable 40 milliGRAM(s) IV Push every 12 hours  senna 2 Tablet(s) Oral at bedtime  simethicone 80 milliGRAM(s) Chew four times a day    MEDICATIONS  (PRN):  aluminum hydroxide/magnesium hydroxide/simethicone Suspension 30 milliLiter(s) Oral every 4 hours PRN Dyspepsia  calamine/zinc oxide Lotion 1 Application(s) Topical four times a day PRN Itching  cholestyramine Powder (Sugar-Free) 4 Gram(s) Oral two times a day PRN itching  zolpidem 5 milliGRAM(s) Oral at bedtime PRN Insomnia      CAPILLARY BLOOD GLUCOSE        I&O's Summary      PHYSICAL EXAM:  Vital Signs Last 24 Hrs  T(C): 36.4 (26 Jul 2022 03:24), Max: 36.8 (25 Jul 2022 12:24)  T(F): 97.6 (26 Jul 2022 03:24), Max: 98.2 (25 Jul 2022 12:24)  HR: 56 (26 Jul 2022 03:24) (46 - 60)  BP: 157/76 (26 Jul 2022 03:24) (125/72 - 168/70)  BP(mean): --  RR: 21 (26 Jul 2022 03:24) (12 - 21)  SpO2: 94% (26 Jul 2022 03:24) (92% - 96%)    Parameters below as of 26 Jul 2022 03:24  Patient On (Oxygen Delivery Method): nasal cannula  O2 Flow (L/min): 2        Constitutional: NAD  HEENT: non-icteric sclera, no conjunctival pallor appreciated  Respiratory: CTA b/l, no labored breathing  Cardiovascular: RRR, normal S1S2  Gastrointestinal: soft, NTND, no masses palpable  Extremities: Warm, well perfused, no edema appreciated in the b/l lower extremities.   Neurological: AAOx3  Skin: Normal temperature, warm, dry  LABS:                        8.5    11.22 )-----------( 349      ( 25 Jul 2022 07:36 )             27.1     07-25    140  |  100  |  45<H>  ----------------------------<  152<H>  3.3<L>   |  25  |  2.42<H>    Ca    9.5      25 Jul 2022 07:29  Phos  3.1     07-25  Mg     2.0     07-25    TPro  6.9  /  Alb  3.5  /  TBili  10.4<H>  /  DBili  x   /  AST  109<H>  /  ALT  143<H>  /  AlkPhos  748<H>  07-25                RADIOLOGY & ADDITIONAL TESTS:  Results Reviewed:   Imaging Personally Reviewed:  Electrocardiogram Personally Reviewed:    COORDINATION OF CARE:  Care Discussed with Consultants/Other Providers [Y/N]:  Prior or Outpatient Records Reviewed [Y/N]:

## 2022-07-26 NOTE — PROGRESS NOTE ADULT - SUBJECTIVE AND OBJECTIVE BOX
Interval Events:   - s/p ERCP, see report  - This AM with no significant change, still with pruritus    Allergies:  penicillin (Unknown)  tetracaine (Unknown)        Hospital Medications:  aluminum hydroxide/magnesium hydroxide/simethicone Suspension 30 milliLiter(s) Oral every 4 hours PRN  ATENolol  Tablet 25 milliGRAM(s) Oral daily  calamine/zinc oxide Lotion 1 Application(s) Topical four times a day PRN  cholestyramine Powder (Sugar-Free) 4 Gram(s) Oral two times a day PRN  heparin   Injectable 5000 Unit(s) SubCutaneous every 8 hours  lactated ringers. 500 milliLiter(s) IV Continuous <Continuous>  melatonin 3 milliGRAM(s) Oral at bedtime  pantoprazole  Injectable 40 milliGRAM(s) IV Push every 12 hours  senna 2 Tablet(s) Oral at bedtime  simethicone 80 milliGRAM(s) Chew four times a day  zolpidem 5 milliGRAM(s) Oral at bedtime PRN      PMHX/PSHX:  HLD (hyperlipidemia)    GERD (gastroesophageal reflux disease)    HTN (hypertension)    H/O:         Family history:  Family history of diabetes mellitus (DM) (Mother)    FH: liver cancer (Child)        ROS: As per HPI, otherwise 14-point ROS reviewed and negative.      PHYSICAL EXAM:   Vital Signs:  Vital Signs Last 24 Hrs  T(C): 36.4 (2022 03:24), Max: 36.8 (2022 12:24)  T(F): 97.6 (2022 03:24), Max: 98.2 (2022 12:24)  HR: 56 (2022 03:24) (46 - 60)  BP: 157/76 (2022 03:24) (125/72 - 168/70)  BP(mean): --  RR: 21 (2022 03:24) (12 - 21)  SpO2: 94% (2022 03:24) (92% - 96%)    Parameters below as of 2022 03:24  Patient On (Oxygen Delivery Method): nasal cannula  O2 Flow (L/min): 2    Daily Height in cm: 162.56 (2022 15:15)    Daily       GENERAL: no acute distress  NEURO: alert  HEENT: NCAT, no conjunctival pallor appreciated; +icterius   CHEST: no respiratory distress, no accessory muscle use  CARDIAC: regular rate, +S1/S2  ABDOMEN: soft, nondistended, nontender, no rebound or guarding  EXTREMITIES: warm, well perfused  SKIN: no lesions noted; grossly jaundiced    LABS:                        8.5    11.22 )-----------( 349      ( 2022 07:36 )             27.1           140  |  100  |  45<H>  ----------------------------<  152<H>  3.3<L>   |  25  |  2.42<H>    Ca    9.5      2022 07:29  Phos  3.1       Mg     2.0         TPro  6.9  /  Alb  3.5  /  TBili  10.4<H>  /  DBili  x   /  AST  109<H>  /  ALT  143<H>  /  AlkPhos  748<H>      LIVER FUNCTIONS - ( 2022 07:29 )  Alb: 3.5 g/dL / Pro: 6.9 g/dL / ALK PHOS: 748 U/L / ALT: 143 U/L / AST: 109 U/L / GGT: x                                       8.5    11.22 )-----------( 349      ( 2022 07:36 )             27.1                         8.3    11.19 )-----------( 350      ( 2022 17:58 )             25.2                         7.1    11.22 )-----------( 342      ( 2022 07:26 )             22.0       Imaging:

## 2022-07-26 NOTE — PROGRESS NOTE ADULT - SUBJECTIVE AND OBJECTIVE BOX
[facility] DIVISION OF KIDNEY DISEASES AND HYPERTENSION -- FOLLOW UP NOTE  --------------------------------------------------------------------------------  Chief Complaint:    24 hour events/subjective:  ERCP showing potential malignancy  crt stable      PAST HISTORY  --------------------------------------------------------------------------------  No significant changes to PMH, PSH, FHx, SHx, unless otherwise noted    ALLERGIES & MEDICATIONS  --------------------------------------------------------------------------------  Allergies    penicillin (Unknown)  tetracaine (Unknown)    Intolerances      Standing Inpatient Medications  ATENolol  Tablet 25 milliGRAM(s) Oral daily  heparin   Injectable 5000 Unit(s) SubCutaneous every 8 hours  lactated ringers. 500 milliLiter(s) IV Continuous <Continuous>  melatonin 3 milliGRAM(s) Oral at bedtime  pantoprazole    Tablet 40 milliGRAM(s) Oral before breakfast  senna 2 Tablet(s) Oral at bedtime  simethicone 80 milliGRAM(s) Chew four times a day    PRN Inpatient Medications  aluminum hydroxide/magnesium hydroxide/simethicone Suspension 30 milliLiter(s) Oral every 4 hours PRN  calamine/zinc oxide Lotion 1 Application(s) Topical four times a day PRN  cholestyramine Powder (Sugar-Free) 4 Gram(s) Oral two times a day PRN  zolpidem 5 milliGRAM(s) Oral at bedtime PRN      REVIEW OF SYSTEMS  --------------------------------------------------------------------------------  Gen: No weight changes, fatigue, fevers/chills, weakness  Skin: No rashes  Head/Eyes/Ears/Mouth: No headache; Normal hearing; Normal vision w/o blurriness; No sinus pain/discomfort, sore throat  Respiratory: No dyspnea, cough, wheezing, hemoptysis  CV: No chest pain, PND, orthopnea  GI: No abdominal pain, diarrhea, constipation, nausea, vomiting, melena, hematochezia  : No increased frequency, dysuria, hematuria, nocturia  MSK: No joint pain/swelling; no back pain; no edema  Neuro: No dizziness/lightheadedness, weakness, seizures, numbness, tingling  Heme: No easy bruising or bleeding  Endo: No heat/cold intolerance  Psych: No significant nervousness, anxiety, stress, depression    All other systems were reviewed and are negative, except as noted.    VITALS/PHYSICAL EXAM  --------------------------------------------------------------------------------  T(C): 36.4 (07-26-22 @ 03:24), Max: 36.7 (07-25-22 @ 14:48)  HR: 56 (07-26-22 @ 03:24) (48 - 60)  BP: 157/76 (07-26-22 @ 03:24) (125/72 - 168/70)  RR: 21 (07-26-22 @ 03:24) (12 - 21)  SpO2: 94% (07-26-22 @ 03:24) (92% - 96%)  Wt(kg): --  Height (cm): 162.6 (07-25-22 @ 15:15)  Weight (kg): 74.9 (07-25-22 @ 15:15)  BMI (kg/m2): 28.3 (07-25-22 @ 15:15)  BSA (m2): 1.8 (07-25-22 @ 15:15)      Physical Exam:  	Gen: NAD, well-appearing  	HEENT: PERRL, supple neck, clear oropharynx  	Pulm: CTA B/L  	CV: RRR, S1S2; no rub  	Back: No spinal or CVA tenderness; no sacral edema  	Abd: +BS, soft, nontender/nondistended  	: No suprapubic tenderness  	UE: Warm, FROM, no clubbing, intact strength; no edema; no asterixis  	LE: Warm, FROM, no clubbing, intact strength; no edema  	Neuro: No focal deficits, intact gait  	Psych: Normal affect and mood  	Skin: Warm, without rashes  	Vascular access:    LABS/STUDIES  --------------------------------------------------------------------------------              8.1    8.12  >-----------<  321      [07-26-22 @ 11:22]              25.8     141  |  102  |  54  ----------------------------<  157      [07-26-22 @ 11:22]  3.5   |  24  |  2.51        Ca     9.3     [07-26-22 @ 11:22]      Mg     2.1     [07-26-22 @ 11:22]      Phos  3.6     [07-26-22 @ 11:22]    TPro  6.3  /  Alb  3.1  /  TBili  8.3  /  DBili  x   /  AST  81  /  ALT  120  /  AlkPhos  722  [07-26-22 @ 11:22]              [07-25-22 @ 07:29]    Creatinine Trend:  SCr 2.51 [07-26 @ 11:22]  SCr 2.42 [07-25 @ 07:29]  SCr 2.94 [07-24 @ 07:17]  SCr 2.92 [07-23 @ 21:12]  SCr 3.27 [07-23 @ 07:07]    Urinalysis - [07-23-22 @ 10:26]      Color Yellow / Appearance Clear / SG 1.012 / pH 5.0      Gluc Negative / Ketone Negative  / Bili Small / Urobili Negative       Blood Trace / Protein Trace / Leuk Est Negative / Nitrite Negative      RBC 6 / WBC 3 / Hyaline 2 / Gran  / Sq Epi  / Non Sq Epi 1 / Bacteria Negative    Urine Creatinine 75      [07-23-22 @ 10:26]  Urine Protein 17      [07-23-22 @ 10:26]  Urine Sodium 57      [07-23-22 @ 10:26]  Urine Urea Nitrogen 378      [07-23-22 @ 10:26]  Urine Potassium 37      [07-23-22 @ 10:26]  Urine Osmolality 179      [07-20-22 @ 18:53]    Iron 41, TIBC 312, %sat 13      [07-20-22 @ 06:58]  Ferritin 103      [07-25-22 @ 07:38]    HBsAg Nonreact      [07-19-22 @ 18:34]  HCV 0.11, Nonreact      [07-19-22 @ 18:34]

## 2022-07-26 NOTE — PROGRESS NOTE ADULT - SUBJECTIVE AND OBJECTIVE BOX
CARDIOLOGY     PROGRESS  NOTE   ________________________________________________    CHIEF COMPLAINT:Patient is a 89y old  Female who presents with a chief complaint of Itching, orange urine, pale stools, RUQ abdominal pain (26 Jul 2022 07:15)  doing well post procedure.  	  REVIEW OF SYSTEMS:  CONSTITUTIONAL: No fever, weight loss, or fatigue  EYES: No eye pain, visual disturbances, or discharge  ENT:  No difficulty hearing, tinnitus, vertigo; No sinus or throat pain  NECK: No pain or stiffness  RESPIRATORY: No cough, wheezing, chills or hemoptysis; No Shortness of Breath  CARDIOVASCULAR: No chest pain, palpitations, passing out, dizziness, or leg swelling  GASTROINTESTINAL: No abdominal or epigastric pain. No nausea, vomiting, or hematemesis; No diarrhea or constipation. No melena or hematochezia.  GENITOURINARY: No dysuria, frequency, hematuria, or incontinence  NEUROLOGICAL: No headaches, memory loss, loss of strength, numbness, or tremors  SKIN: No itching, burning, rashes, or lesions   LYMPH Nodes: No enlarged glands  ENDOCRINE: No heat or cold intolerance; No hair loss  MUSCULOSKELETAL: No joint pain or swelling; No muscle, back, or extremity pain  PSYCHIATRIC: No depression, anxiety, mood swings, or difficulty sleeping  HEME/LYMPH: No easy bruising, or bleeding gums  ALLERGY AND IMMUNOLOGIC: No hives or eczema	    [ ] All others negative	  [ ] Unable to obtain    PHYSICAL EXAM:  T(C): 36.4 (07-26-22 @ 03:24), Max: 36.8 (07-25-22 @ 12:24)  HR: 56 (07-26-22 @ 03:24) (46 - 60)  BP: 157/76 (07-26-22 @ 03:24) (125/72 - 168/70)  RR: 21 (07-26-22 @ 03:24) (12 - 21)  SpO2: 94% (07-26-22 @ 03:24) (92% - 96%)  Wt(kg): --  I&O's Summary      Appearance: Normal/ jaundice	  HEENT:   Normal oral mucosa, PERRL, EOMI	  Lymphatic: No lymphadenopathy  Cardiovascular: Normal S1 S2, No JVD, No murmurs, No edema  Respiratory: Lungs clear to auscultation	  Psychiatry: A & O x 3, Mood & affect appropriate  Gastrointestinal:  Soft, Non-tender, + BS	  Skin: No rashes, No ecchymoses, No cyanosis	  Neurologic: Non-focal  Extremities: Normal range of motion, No clubbing, cyanosis or edema  Vascular: Peripheral pulses palpable 2+ bilaterally    MEDICATIONS  (STANDING):  ATENolol  Tablet 25 milliGRAM(s) Oral daily  heparin   Injectable 5000 Unit(s) SubCutaneous every 8 hours  lactated ringers. 500 milliLiter(s) (30 mL/Hr) IV Continuous <Continuous>  melatonin 3 milliGRAM(s) Oral at bedtime  pantoprazole  Injectable 40 milliGRAM(s) IV Push every 12 hours  senna 2 Tablet(s) Oral at bedtime  simethicone 80 milliGRAM(s) Chew four times a day      TELEMETRY: 	    ECG:  	  RADIOLOGY:  OTHER: 	  	  LABS:	 	    CARDIAC MARKERS:                                8.5    11.22 )-----------( 349      ( 25 Jul 2022 07:36 )             27.1     07-25    140  |  100  |  45<H>  ----------------------------<  152<H>  3.3<L>   |  25  |  2.42<H>    Ca    9.5      25 Jul 2022 07:29  Phos  3.1     07-25  Mg     2.0     07-25    TPro  6.9  /  Alb  3.5  /  TBili  10.4<H>  /  DBili  x   /  AST  109<H>  /  ALT  143<H>  /  AlkPhos  748<H>  07-25    proBNP:   Lipid Profile:   HgA1c:   TSH:   < from: ERCP (07.25.22 @ 15:02) >  Impression:          - 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 Biopsied.                       - ERCP revealed diffuse biliary ductal dilation s/p placement of a 10 mm x 6                        cm fully covered self expanding biliary stent      Assessment and plan  ---------------------------  89yof with HTN and HLD presented to outpatient doctor with 1 week of itching, orange urine, pale stools, and intermittent RUQ abdominal pain sent in by PCP for concern for malignancy. Patient reports the abdominal pain was more like a soreness, intermittent, 3/10 in severity. 1 episode of NBNB vomiting, patient attributed to reflux. Patient did report dark stool about 3 weeks prior to admission, denies PO iron use. Does report to be anemic, but denies dizziness, weakness, lightheadedness. Denies any history of kidney disease. Does report decreased appetite over past 4 days, decreased hydration as well. Patient had colonoscopy she thinks 4-5y ago, she believes also had endoscopy then, reports everything was normal. Did receive appropriate screening mammograms. Of note she did have muscles in a restaurant 10d ago, which is 3d prior to symptom onset. Denies fevers, but endorses chills. Denies hematemesis, hematuria, dysuria, urinary frequency.   pt with hx of htn , ckd stage 3, with RUQ abdominal pain.  pt denies of any cardiac hx , with LBBB ?old, echo with normal EF and no WMA with mod pulmonary htn  transfuse one unit of prbc, Lasix one dose post transfusion  pt is clear cardiac wise for the ERCP with mod risk  will follow up  dvt prophylaxis  pulmonary HTN ?etiology  replete K  will fu post procedure  ercp noted  will add norvasc if hypertensive

## 2022-07-26 NOTE — PROGRESS NOTE ADULT - ASSESSMENT
89F PMHx of HTN, HLD presents withe one week of intermittment RUQ pain and obstructive jaundice. Inpatient workup with MRCP and ERCP significant for possible malignant duodenal mass. Ca 19-9 elevated to 56. Surgical oncology consulted for surgical evaluation of duodenal mass.     Plan:    - CT Chest non-contrast to complete malignancy workup  - f/u ERCP biopsy results  - Medical and cardiac optimization for possible OR  - Possible Whipple pending biopsy results  - Surg onc to continue to follow    Red Team Surgery   [id number]

## 2022-07-26 NOTE — CONSULT NOTE ADULT - REASON FOR ADMISSION
Itching, orange urine, pale stools, RUQ abdominal pain

## 2022-07-26 NOTE — PROGRESS NOTE ADULT - PROBLEM SELECTOR PLAN 2
- Creatinine 2.9 (7/24) this AM, current downtrend but still elevated from admission level of 2.016  Previous creatinine of Cr 1.58 (2018) patient denies Hx CKD however. Of note has 5cm b/l renal cysts seen on CTAP.   - less likely contrast nephropathy due to <48 hour time window since receiving last contrast.   - avoid nephrotoxins  - hydrochlorothiazide 25 mg daily and losartan 100 mg daily currently held given CAMRON on CKD  - FENA of 1.8% (7/23) indicating intrinsic renal injury  - renal US per renal recommendations - Creatinine 2.9 (7/24) this AM, current downtrend but still elevated from admission level of 2.016  Previous creatinine of Cr 1.58 (2018) patient denies Hx CKD however. Of note has 5cm b/l renal cysts seen on CTAP.   - less likely contrast nephropathy due to <48 hour time window since receiving last contrast.   - avoid nephrotoxins  - hydrochlorothiazide 25 mg daily and losartan 100 mg daily currently held given CAMRON on CKD  - FENA of 1.8% (7/23) indicating intrinsic renal injury  - renal US per renal recommendations demonstrating b/l cysts without hydronephrosis - Creatinine 2.9 (7/24) this AM, current downtrend but still elevated from admission level of 2.016  Previous creatinine of Cr 1.58 (2018) patient denies Hx CKD however. Of note has 5cm b/l renal cysts seen on CTAP.   - less likely contrast nephropathy due to <48 hour time window since receiving last contrast.   - avoid nephrotoxins  - hydrochlorothiazide 25 mg daily and losartan 100 mg daily currently held given CAMRON on CKD  - FENA of 1.8% (7/23) indicating intrinsic renal injury  - renal US demonstrating b/l cysts without hydronephrosis  - Neph: continue to monitor renal function panel and urine out put, order EDEL, dsDNA, anti smith, RF, ANCAs, anti GBM, c3,c4,RPR, immunoglobulin free light chains, Serum immunofixation to complete renal workup, dose all meds for GFR changes

## 2022-07-26 NOTE — PROVIDER CONTACT NOTE (OTHER) - SITUATION
Patient HR 46
pt complaining of abdominal pain radiating to back
pt has no relief with 2.5 mg oxycodone given for abdominal pain.
pt presenting with cough and wheeze

## 2022-07-26 NOTE — PROVIDER CONTACT NOTE (OTHER) - ASSESSMENT
pt on 2Lnc, SpO2 94%
Patient HR 46, vitals otherwise stable. Patient denies chest discomfort, lightheadedness and confusion
pt complaining of abdominal pain radiating to back, pt feeling anxious, moaning and groaning in pain
pt moaning and groaning, complaining of 6/10 pain

## 2022-07-26 NOTE — CONSULT NOTE ADULT - SUBJECTIVE AND OBJECTIVE BOX
Oncology Consult Note    HPI as per admitting team:   89yof with HTN and HLD presented to outpatient doctor with 1 week of itching, orange urine, pale stools, and intermittent RUQ abdominal pain sent in by PCP for concern for malignancy. Patient reports the abdominal pain was more like a soreness, intermittent, 3/10 in severity. 1 episode of NBNB vomiting, patient attributed to reflux. Patient did report dark stool about 3 weeks prior to admission, denies PO iron use. Does report to be anemic, but denies dizziness, weakness, lightheadedness. Denies any history of kidney disease. Does report decreased appetite over past 4 days, decreased hydration as well. Patient had colonoscopy she thinks 4-5y ago, she believes also had endoscopy then, reports everything was normal. Did receive appropriate screening mammograms. Of note she did have muscles in a restaurant 10d ago, which is 3d prior to symptom onset. Denies fevers, but endorses chills. Denies hematemesis, hematuria, dysuria, urinary frequency.     ED Course: Afebrile, vitals wnl and stable.   Received 1L bolus as well as protonox 40 IV x1.  (2022 23:58)        REVIEW OF SYSTEMS:    CONSTITUTIONAL: No weakness, fevers or chills  EYES/ENT: No visual changes;  No vertigo or throat pain   NECK: No pain or stiffness  RESPIRATORY: No cough, wheezing, hemoptysis; No shortness of breath  CARDIOVASCULAR: No chest pain or palpitations  GASTROINTESTINAL: No abdominal or epigastric pain. No nausea, vomiting, or hematemesis; No diarrhea or constipation. No melena or hematochezia.  GENITOURINARY: No dysuria, frequency or hematuria  NEUROLOGICAL: No numbness or weakness  SKIN: No itching, burning, rashes, or lesions   All other review of systems is negative unless indicated above.    PAST MEDICAL & SURGICAL HISTORY:  HLD (hyperlipidemia)      GERD (gastroesophageal reflux disease)      HTN (hypertension)      H/O:   x2          FAMILY HISTORY:  Family history of diabetes mellitus (DM) (Mother)    FH: liver cancer (Child)        SOCIAL HISTORY:     Allergies    penicillin (Unknown)  tetracaine (Unknown)    Intolerances        MEDICATIONS  (STANDING):  ATENolol  Tablet 25 milliGRAM(s) Oral daily  heparin   Injectable 5000 Unit(s) SubCutaneous every 8 hours  lactated ringers. 500 milliLiter(s) (30 mL/Hr) IV Continuous <Continuous>  melatonin 3 milliGRAM(s) Oral at bedtime  pantoprazole  Injectable 40 milliGRAM(s) IV Push every 12 hours  senna 2 Tablet(s) Oral at bedtime  simethicone 80 milliGRAM(s) Chew four times a day    MEDICATIONS  (PRN):  aluminum hydroxide/magnesium hydroxide/simethicone Suspension 30 milliLiter(s) Oral every 4 hours PRN Dyspepsia  calamine/zinc oxide Lotion 1 Application(s) Topical four times a day PRN Itching  cholestyramine Powder (Sugar-Free) 4 Gram(s) Oral two times a day PRN itching  zolpidem 5 milliGRAM(s) Oral at bedtime PRN Insomnia      OBJECTIVE   Height (cm): 162.6 ( @ 15:15)  Weight (kg): 74.9 ( @ 15:15)  BMI (kg/m2): 28.3 ( @ 15:15)  BSA (m2): 1.8 ( @ 15:15)    T(F): 97.6 (22 @ 03:24), Max: 98.2 (22 @ 12:24)  HR: 56 (22 @ 03:24)  BP: 157/76 (22 @ 03:24)  RR: 21 (22 @ 03:24)  SpO2: 94% (22 @ 03:24)  Wt(kg): --    PHYSICAL EXAM   GENERAL: NAD, well-developed  HEAD:  Atraumatic, Normocephalic  EYES: EOMI, PERRLA, conjunctiva and sclera clear  NECK: Supple, No JVD  CHEST/LUNG: Clear to auscultation bilaterally; No wheeze  HEART: Regular rate and rhythm; No murmurs, rubs, or gallops  ABDOMEN: Soft, Nontender, Nondistended; Bowel sounds present  EXTREMITIES:  2+ Peripheral Pulses, No clubbing, cyanosis, or edema  NEUROLOGY: non-focal  SKIN: No rashes or lesions                          8.5    11.22 )-----------( 349      ( 2022 07:36 )             27.1           140  |  100  |  45<H>  ----------------------------<  152<H>  3.3<L>   |  25  |  2.42<H>    Ca    9.5      2022 07:29  Phos  3.1       Mg     2.0         TPro  6.9  /  Alb  3.5  /  TBili  10.4<H>  /  DBili  x   /  AST  109<H>  /  ALT  143<H>  /  AlkPhos  748<H>             Oncology Consult Note    HPI as per admitting team:   89yof with HTN and HLD presented to outpatient doctor with 1 week of itching, orange urine, pale stools, and intermittent RUQ abdominal pain sent in by PCP for concern for malignancy. Patient reports the abdominal pain was more like a soreness, intermittent, 3/10 in severity. 1 episode of NBNB vomiting, patient attributed to reflux. Patient did report dark stool about 3 weeks prior to admission, denies PO iron use. Does report to be anemic, but denies dizziness, weakness, lightheadedness. Denies any history of kidney disease. Does report decreased appetite over past 4 days, decreased hydration as well. Patient had colonoscopy she thinks 4-5y ago, she believes also had endoscopy then, reports everything was normal. Did receive appropriate screening mammograms. Of note she did have muscles in a restaurant 10d ago, which is 3d prior to symptom onset. Denies fevers, but endorses chills. Denies hematemesis, hematuria, dysuria, urinary frequency.     ED Course: Afebrile, vitals wnl and stable.   Received 1L bolus as well as protonox 40 IV x1.  (2022 23:58)    Onc Hx:  CT abd/pelvis w/ IV con 22 showed diffuse intrahepatic and extra hepatic biliary ductal dilatation with common bile duct measuring 1.3 cm with abrupt narrowing near the ampulla. Biliary obstruction possibly secondary to ampullary mass, biliary stricture or small stone. No focal hypoenhancing pancreatic lesion or dilatation of the main pancreatic duct.  MRCP 22 - Biliary duct dilation with abrupt cut off near the ampulla, where there is an associated 2.4 cm periampullary lesion. Mild prominence of the pancreatic duct to 4 mm in the head. 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 Distended gallbladder with sludge. Mild nonspecific edematous gallbladder wall thickening. Right adnexal cyst measuring 1.7 cm. 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 Extends into distal CBD but no invasion of pancreatic duct.      REVIEW OF SYSTEMS:  CONSTITUTIONAL: No weakness, fevers or chills  EYES/ENT: No visual changes;  No vertigo or throat pain   NECK: No pain or stiffness  RESPIRATORY: No cough, wheezing, hemoptysis; No shortness of breath  CARDIOVASCULAR: No chest pain or palpitations  GASTROINTESTINAL: No abdominal or epigastric pain. No nausea, vomiting, or hematemesis; No diarrhea or constipation. No melena or hematochezia.  GENITOURINARY: No dysuria, frequency or hematuria  NEUROLOGICAL: No numbness or weakness  SKIN: No itching, burning, rashes, or lesions   All other review of systems is negative unless indicated above.    PAST MEDICAL & SURGICAL HISTORY:  HLD (hyperlipidemia)      GERD (gastroesophageal reflux disease)      HTN (hypertension)      H/O:   x2      FAMILY HISTORY:  Family history of diabetes mellitus (DM) (Mother)  FH: liver cancer (Child)    SOCIAL HISTORY:     Allergies  penicillin (Unknown)  tetracaine (Unknown)      MEDICATIONS  (STANDING):  ATENolol  Tablet 25 milliGRAM(s) Oral daily  heparin   Injectable 5000 Unit(s) SubCutaneous every 8 hours  lactated ringers. 500 milliLiter(s) (30 mL/Hr) IV Continuous <Continuous>  melatonin 3 milliGRAM(s) Oral at bedtime  pantoprazole  Injectable 40 milliGRAM(s) IV Push every 12 hours  senna 2 Tablet(s) Oral at bedtime  simethicone 80 milliGRAM(s) Chew four times a day    MEDICATIONS  (PRN):  aluminum hydroxide/magnesium hydroxide/simethicone Suspension 30 milliLiter(s) Oral every 4 hours PRN Dyspepsia  calamine/zinc oxide Lotion 1 Application(s) Topical four times a day PRN Itching  cholestyramine Powder (Sugar-Free) 4 Gram(s) Oral two times a day PRN itching  zolpidem 5 milliGRAM(s) Oral at bedtime PRN Insomnia      OBJECTIVE   Height (cm): 162.6 ( @ 15:15)  Weight (kg): 74.9 ( @ 15:15)  BMI (kg/m2): 28.3 ( @ 15:15)  BSA (m2): 1.8 ( @ 15:15)    T(F): 97.6 (22 @ 03:24), Max: 98.2 (22 @ 12:24)  HR: 56 (22 @ 03:24)  BP: 157/76 (22 @ 03:24)  RR: 21 (22 @ 03:24)  SpO2: 94% (22 @ 03:24)  Wt(kg): --    PHYSICAL EXAM   GENERAL: NAD, well-developed  HEAD:  Atraumatic, Normocephalic  EYES: EOMI, PERRLA, conjunctiva and sclera clear  NECK: Supple, No JVD  CHEST/LUNG: Clear to auscultation bilaterally; No wheeze  HEART: Regular rate and rhythm; No murmurs, rubs, or gallops  ABDOMEN: Soft, Nontender, Nondistended; Bowel sounds present  EXTREMITIES:  2+ Peripheral Pulses, No clubbing, cyanosis, or edema  NEUROLOGY: non-focal  SKIN: No rashes or lesions                          8.5    11. )-----------( 349      ( 2022 07:36 )             27.1           140  |  100  |  45<H>  ----------------------------<  152<H>  3.3<L>   |  25  |  2.42<H>    Ca    9.5      2022 07:29  Phos  3.1       Mg     2.0         TPro  6.9  /  Alb  3.5  /  TBili  10.4<H>  /  DBili  x   /  AST  109<H>  /  ALT  143<H>  /  AlkPhos  748<H>      RADIOLOGY & ADDITIONAL TESTING:  < from: CT Abdomen and Pelvis w/ IV Cont (22 @ 17:24) >  FINDINGS:  LOWER CHEST: Within normal limits.    LIVER: Within normal limits.  BILE DUCTS: Diffuse intrahepatic and extra hepatic biliary ductal   dilatation with common bile duct measuring 1.3 cm with abrupt narrowing   near the ampulla.  GALLBLADDER: Distended gallbladder with sludge. No gallbladder wall   thickening or pericholecystic edema.  SPLEEN: Within normal limits.  PANCREAS: No focal hypoenhancing lesion. Normal caliber main pancreatic   duct.  ADRENALS: Within normal limits.  KIDNEYS/URETERS: No hydronephrosis. Bilateral renal cysts and hypodense   renal foci too small to characterize.    BLADDER: Minimally distended.  REPRODUCTIVE ORGANS: Uterus and adnexa within normal limits.    BOWEL: No bowel obstruction. Colonic diverticulosis. Appendix is normal.  PERITONEUM: No ascites.  VESSELS: Atherosclerotic changes.  RETROPERITONEUM/LYMPH NODES: No lymphadenopathy.  ABDOMINAL WALL: Tiny fat-containing umbilical hernia.  BONES: Degenerative changes. Grade 1 anterolisthesis of L5 on S1.    IMPRESSION:  Diffuse intrahepatic and extra hepatic biliary ductal dilatation with   common bile duct measuring 1.3 cm with abrupt narrowing near the ampulla.   Biliary obstruction possibly secondary to ampullary mass, biliary   stricture or small stone. No focal hypoenhancing pancreatic lesion or   dilatation of the main pancreatic duct. Further evaluation can be   obtained with MRCP with IV contrast.    --- End of Report ---     RAMANDEEP ONEIL MD; Resident Radiology  This document has been electronically signed.  JENNIFER LEWIS MD; Attending Radiologist  This document has been electronically signed. 2022  5:55PM    < end of copied text >   Oncology Consult Note    HPI as per admitting team:   89yof with HTN and HLD presented to outpatient doctor with 1 week of itching, orange urine, pale stools, and intermittent RUQ abdominal pain sent in by PCP for concern for malignancy. Patient reports the abdominal pain was more like a soreness, intermittent, 3/10 in severity. 1 episode of NBNB vomiting, patient attributed to reflux. Patient did report dark stool about 3 weeks prior to admission, denies PO iron use. Does report to be anemic, but denies dizziness, weakness, lightheadedness. Denies any history of kidney disease. Does report decreased appetite over past 4 days, decreased hydration as well. Patient had colonoscopy she thinks 4-5y ago, she believes also had endoscopy then, reports everything was normal. Did receive appropriate screening mammograms. Of note she did have muscles in a restaurant 10d ago, which is 3d prior to symptom onset. Denies fevers, but endorses chills. Denies hematemesis, hematuria, dysuria, urinary frequency.     ED Course: Afebrile, vitals wnl and stable.   Received 1L bolus as well as protonox 40 IV x1.  (2022 23:58)    Onc Hx:  CT abd/pelvis w/ IV con 22 showed diffuse intrahepatic and extra hepatic biliary ductal dilatation with common bile duct measuring 1.3 cm with abrupt narrowing near the ampulla. Biliary obstruction possibly secondary to ampullary mass, biliary stricture or small stone. No focal hypoenhancing pancreatic lesion or dilatation of the main pancreatic duct.  MRCP 22 - Biliary duct dilation with abrupt cut off near the ampulla, where there is an associated 2.4 cm periampullary lesion. Mild prominence of the pancreatic duct to 4 mm in the head. 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 Distended gallbladder with sludge. Mild nonspecific edematous gallbladder wall thickening. Right adnexal cyst measuring 1.7 cm. 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 Extends into distal CBD but no invasion of pancreatic duct.      REVIEW OF SYSTEMS:  CONSTITUTIONAL: No weakness, fevers or chills  EYES/ENT: No visual changes;  No vertigo or throat pain   NECK: No pain or stiffness  RESPIRATORY: No cough, wheezing, hemoptysis; No shortness of breath  CARDIOVASCULAR: No chest pain or palpitations  GASTROINTESTINAL: Currently no abdominal or epigastric pain. No nausea, vomiting, or hematemesis; No diarrhea or constipation. No melena or hematochezia.  GENITOURINARY: No dysuria, frequency or hematuria  NEUROLOGICAL: No numbness or weakness  SKIN: +Jaundice and pruritis  All other review of systems is negative unless indicated above.    PAST MEDICAL & SURGICAL HISTORY:  HLD (hyperlipidemia)      GERD (gastroesophageal reflux disease)      HTN (hypertension)      H/O:   x2      FAMILY HISTORY:  Family history of diabetes mellitus (DM) (Mother)  FH: liver cancer (Child)    SOCIAL HISTORY:     Allergies  penicillin (Unknown)  tetracaine (Unknown)      MEDICATIONS  (STANDING):  ATENolol  Tablet 25 milliGRAM(s) Oral daily  heparin   Injectable 5000 Unit(s) SubCutaneous every 8 hours  lactated ringers. 500 milliLiter(s) (30 mL/Hr) IV Continuous <Continuous>  melatonin 3 milliGRAM(s) Oral at bedtime  pantoprazole  Injectable 40 milliGRAM(s) IV Push every 12 hours  senna 2 Tablet(s) Oral at bedtime  simethicone 80 milliGRAM(s) Chew four times a day    MEDICATIONS  (PRN):  aluminum hydroxide/magnesium hydroxide/simethicone Suspension 30 milliLiter(s) Oral every 4 hours PRN Dyspepsia  calamine/zinc oxide Lotion 1 Application(s) Topical four times a day PRN Itching  cholestyramine Powder (Sugar-Free) 4 Gram(s) Oral two times a day PRN itching  zolpidem 5 milliGRAM(s) Oral at bedtime PRN Insomnia      OBJECTIVE   Height (cm): 162.6 ( @ 15:15)  Weight (kg): 74.9 ( @ 15:15)  BMI (kg/m2): 28.3 ( @ 15:15)  BSA (m2): 1.8 ( @ 15:15)    T(F): 97.6 (22 @ 03:24), Max: 98.2 (22 @ 12:24)  HR: 56 (22 @ 03:24)  BP: 157/76 (22 @ 03:24)  RR: 21 (22 @ 03:24)  SpO2: 94% (22 @ 03:24)  Wt(kg): --    PHYSICAL EXAM   GENERAL: NAD, well-developed  HEAD:  Atraumatic, Normocephalic  EYES: EOMI, PERRLA, conjunctiva and sclera clear  NECK: Supple, No JVD  CHEST/LUNG: Clear to auscultation bilaterally; No wheeze  HEART: Regular rate and rhythm; No murmurs, rubs, or gallops  ABDOMEN: Soft, Nontender, Nondistended; Bowel sounds present  EXTREMITIES:  2+ Peripheral Pulses, No clubbing, cyanosis, or edema  NEUROLOGY: non-focal  SKIN: Jaundice                          8.5    . )-----------( 349      ( 2022 07:36 )             27.1           140  |  100  |  45<H>  ----------------------------<  152<H>  3.3<L>   |  25  |  2.42<H>    Ca    9.5      2022 07:29  Phos  3.1       Mg     2.0         TPro  6.9  /  Alb  3.5  /  TBili  10.4<H>  /  DBili  x   /  AST  109<H>  /  ALT  143<H>  /  AlkPhos  748<H>      RADIOLOGY & ADDITIONAL TESTING:  < from: CT Abdomen and Pelvis w/ IV Cont (22 @ 17:24) >  FINDINGS:  LOWER CHEST: Within normal limits.    LIVER: Within normal limits.  BILE DUCTS: Diffuse intrahepatic and extra hepatic biliary ductal   dilatation with common bile duct measuring 1.3 cm with abrupt narrowing   near the ampulla.  GALLBLADDER: Distended gallbladder with sludge. No gallbladder wall   thickening or pericholecystic edema.  SPLEEN: Within normal limits.  PANCREAS: No focal hypoenhancing lesion. Normal caliber main pancreatic   duct.  ADRENALS: Within normal limits.  KIDNEYS/URETERS: No hydronephrosis. Bilateral renal cysts and hypodense   renal foci too small to characterize.    BLADDER: Minimally distended.  REPRODUCTIVE ORGANS: Uterus and adnexa within normal limits.    BOWEL: No bowel obstruction. Colonic diverticulosis. Appendix is normal.  PERITONEUM: No ascites.  VESSELS: Atherosclerotic changes.  RETROPERITONEUM/LYMPH NODES: No lymphadenopathy.  ABDOMINAL WALL: Tiny fat-containing umbilical hernia.  BONES: Degenerative changes. Grade 1 anterolisthesis of L5 on S1.    IMPRESSION:  Diffuse intrahepatic and extra hepatic biliary ductal dilatation with   common bile duct measuring 1.3 cm with abrupt narrowing near the ampulla.   Biliary obstruction possibly secondary to ampullary mass, biliary   stricture or small stone. No focal hypoenhancing pancreatic lesion or   dilatation of the main pancreatic duct. Further evaluation can be   obtained with MRCP with IV contrast.    --- End of Report ---     RAMANDEEP ONEIL MD; Resident Radiology  This document has been electronically signed.  JENNIFER LEWIS MD; Attending Radiologist  This document has been electronically signed. 2022  5:55PM    < end of copied text >

## 2022-07-26 NOTE — PROGRESS NOTE ADULT - ASSESSMENT
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

## 2022-07-26 NOTE — PROGRESS NOTE ADULT - ASSESSMENT
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 Unclear etiology,   Pt. with CAMRON vs CAMRON on CKD in the setting of obstructive jaundice, use of diuretics, IV contrast and decreased PO intake. UA with few RBCs (improving) and spot urine TP/Cr 0.2.  Pt. non oliguric, and Scr has been improving.   Renal sonogram showing chronic disease with cysts noted    Acute kidney injury superimposed on CKD.   Unknown baseline creatinine function  Creatinine now downtrending to 2 range from peak of 3.29   UA: no hematuria  and Uprot/cr: 0.2   Please continue to monitor renal function panel as well as urine out put  Please avoid nephrotoxic medications   Patient euvolemic without need for fluids or diuresis   If needs contrast for imaging for cancer, can give pre and post fluids  Please send tests for EDEL, dsDNA, anti smith, RF, ANCAs, anti GBM, c3,c4,RPR, immunoglobulin free light chains, Serum immunofixation to complete renal workup  Dose all meds for GFR changes  Will need close monitoring as planning potential Whipple's procedure eventually, pending bx findings  Will need close outpatient nephrology f.u if discharged    Keshav Stein MD  Pager   Office     Contact me directly via [employer] Teams     (After 5 pm or on weekends please page the on-call fellow/attending, can check [email or web address] for schedule. Login is eduardo [email or web address], schedule under [facility] medicine, psych, derm)

## 2022-07-26 NOTE — PROVIDER CONTACT NOTE (OTHER) - BACKGROUND
Dx: obstructive jaundice, anemia, CAMRON PMH: HLD,HTN,GERD
dx: obstruction of bile duct
pt admitted for bile duct obstruction
Dx= Obstructive Jaundice
pt admitted for ruq pain

## 2022-07-26 NOTE — PROVIDER CONTACT NOTE (OTHER) - ACTION/TREATMENT ORDERED:
MD to order another dose of 2.5 mg
MD made aware, pCXR done, IVF D/Cd, with O2 @L NC, closely monitored, safety maintained.
MD to come assess pt
Obtain EKG. Continue to monitor .
will come to unit to assess pt

## 2022-07-26 NOTE — CONSULT NOTE ADULT - ASSESSMENT
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    ***Note is incomplete. Will discuss plan with attending.     #Duodenal/Ampullary Mass  Admitted for obstructive jaundice and malignancy workup. Initial CT abd/pelvis w/ IV con showing biliary obstruction due to ampullary mass, confirmed on MRCP and now s/p ERCP/EUS 7/25 pending biopsy results for pathologic staging.  - Still awaiting biopsy results to confirm that this mass is malignant  - If malignant, primary duodenal malignancy is relatively rare. Would favor ampullary malignancy (treated as pancreatic) or cholangiocarcinoma or pancreatic malignancy.  - No obvious signs of metastasis on CT abd/pelvis w/ IV con 7/19/22  - Recommend CT chest non-con to complete staging. Would prefer IV contrast but CT chest non-con is sufficient for staging  - Surg onc was consulted; appreciate recs    ***Note is incomplete. Will discuss plan with attending.     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    ***Note is incomplete. Will discuss plan with attending.     #Duodenal/Ampullary Mass  Admitted for obstructive jaundice and malignancy workup. Initial CT abd/pelvis w/ IV con showing biliary obstruction due to ampullary mass, confirmed on MRCP and now s/p ERCP/EUS 7/25 pending biopsy results for identification and pathologic staging.  - Still awaiting biopsy results to confirm that this mass is malignant  - If malignant, primary duodenal malignancy is relatively rare. Would favor ampullary malignancy (also rare, treated as pancreatic) or cholangiocarcinoma or pancreatic malignancy.  - No obvious signs of metastasis on CT abd/pelvis w/ IV con 7/19/22  - Recommend CT chest non-con to complete staging. Would prefer IV contrast but CT chest non-con is sufficient for staging  - CA 19-9 elevated to 56 and CEA wnl 1.4  - T. bili was steadily increasing up to 10.4 until ERCP on 7/25; now starting to downtrend (8.3 today). Will need to continue monitoring; if T. bili worsens then may re-evaluation of stent patency or consider external biliary drain  - Surg onc was consulted; appreciate recs    ***Note is incomplete. Will discuss plan with attending.     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    ***Note is incomplete. Will discuss plan with attending.     #Duodenal/Ampullary Mass  Admitted for obstructive jaundice and malignancy workup. Initial CT abd/pelvis w/ IV con showing biliary obstruction due to ampullary mass, confirmed on MRCP and now s/p ERCP/EUS 7/25 pending biopsy results for identification and pathologic staging.  - Still awaiting biopsy results to confirm that this mass is malignant  - If malignant, primary duodenal malignancy is relatively rare. Would favor ampullary malignancy (also rare, treated as pancreatic) or cholangiocarcinoma or pancreatic malignancy.  - No obvious signs of metastasis on CT abd/pelvis w/ IV con 7/19/22  - Recommend CT chest non-con to complete staging. Would prefer IV contrast but CT chest non-con is sufficient for staging  - CA 19-9 elevated to 56 and CEA wnl 1.4  - T. bili was steadily increasing up to 10.4 until ERCP on 7/25; now starting to downtrend (8.3 today). Will need to continue monitoring; if T. bili worsens then may re-evaluation of stent patency or consider external biliary drain  - Surg onc was consulted; appreciate recs  - Will follow up on biopsy results. Once pathology report is available, will refer to [facility]  - Oncology will continue to follow. Please page with questions    ***Note is incomplete. Will discuss plan with attending.     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    ***Note is incomplete. Will discuss plan with attending.     #Duodenal/Ampullary Mass  Admitted for obstructive jaundice and malignancy workup. Initial CT abd/pelvis w/ IV con showing biliary obstruction due to ampullary mass, confirmed on MRCP and now s/p ERCP/EUS 7/25 pending biopsy results for identification and pathologic staging.  - Still awaiting biopsy results to confirm that this mass is malignant  - If malignant, primary duodenal malignancy is relatively rare. Would favor ampullary malignancy (also rare, treated as pancreatic) or cholangiocarcinoma or pancreatic malignancy.  - No obvious signs of metastasis on CT abd/pelvis w/ IV con 7/19/22  - Recommend CT chest non-con to complete staging. Would prefer IV contrast but CT chest non-con is sufficient for staging  - CA 19-9 elevated to 56 and CEA wnl 1.4  - T. bili was steadily increasing up to 10.4 until ERCP on 7/25; now starting to downtrend (8.3 today). Will need to continue monitoring; if T. bili worsens then may need re-evaluation of stent patency or consider external biliary drain  - Surg onc was consulted; appreciate recs  - Will follow up on biopsy results. Once pathology report is available, will refer to the [facility]  - Oncology will continue to follow. Please page with questions    ***Note is incomplete. Will discuss plan with attending.     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    #Duodenal/Ampullary Mass  Admitted for obstructive jaundice and malignancy workup. Initial CT abd/pelvis w/ IV con showing biliary obstruction due to ampullary mass, confirmed on MRCP and now s/p ERCP/EUS 7/25 pending biopsy results for identification and pathologic staging.  - Still awaiting biopsy results to confirm that this mass is malignant  - If malignant, primary duodenal malignancy is relatively rare. Would favor ampullary malignancy (also rare, treated as pancreatic) or cholangiocarcinoma or pancreatic malignancy.  - No obvious signs of metastasis on CT abd/pelvis w/ IV con 7/19/22  - Recommend CT chest non-con to complete staging. Would prefer IV contrast but CT chest non-con is sufficient for staging  - CA 19-9 elevated to 56 and CEA wnl 1.4  - T. bili was steadily increasing up to 10.4 until ERCP on 7/25; now starting to downtrend (8.3 today). Will need to continue monitoring; if T. bili worsens then may need re-evaluation of stent patency (may require additional stenting), last resort would be external biliary drain  - Surg onc was consulted; appreciate recs  - Will follow up on biopsy results. Already contacted [facility] to plan for follow up  - Oncology will continue to follow. 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    #Duodenal/Ampullary Mass  Admitted for obstructive jaundice and malignancy workup. Initial CT abd/pelvis w/ IV con showing biliary obstruction due to ampullary mass, confirmed on MRCP and now s/p ERCP/EUS 7/25 pending biopsy results for identification and pathologic staging.  - Still awaiting biopsy results to confirm that this mass is malignant  - If malignant, primary duodenal malignancy is relatively rare. Would favor ampullary malignancy (also rare, treated as pancreatic) or cholangiocarcinoma or pancreatic malignancy.  - No obvious signs of metastasis on CT abd/pelvis w/ IV con 7/19/22  - Recommend CT chest non-con to complete staging. Would prefer IV contrast but CT chest non-con is sufficient for staging  - CA 19-9 elevated to 56 and CEA wnl 1.4  - T. bili was steadily increasing up to 10.4 until ERCP on 7/25; now starting to downtrend (8.3 today). Will need to continue monitoring; if T. bili worsens then may need re-evaluation of stent patency (may require additional stenting), last resort would be external biliary drain  - Discussed with surg onc; likely outpatient surgery even if pt is a candidate for Whipple  - Will follow up on biopsy results. Already contacted [facility] to plan for follow up  - Oncology will continue to follow. Please page with questions    Raul Chapman MD  Hematology/Oncology Fellow PGY-4  Pager: [phone]   After 5pm and on weekends please page on-call fellow

## 2022-07-26 NOTE — PROGRESS NOTE ADULT - PROBLEM SELECTOR PLAN 1
Pruritis, pale stools, jaundice, dark urine, intermittent RUQ pain  Poor appetite past few days, denies weight loss.   Elevated ALP, AST, ALT, T-bili-- cholestatic pattern  CT: Biliary obstruction, ddx ampullary mass, cholangiocarcinoma, pancreatic   - MRCP: 2 cm ampullary mass  - GI consulted for evaluation, EUS/ERCP pending   - hepatitis panel negative  - CA 19-9 Level of 56 (7/21)   - Bcx NGTD, Ucx contaminated Pruritis, pale stools, jaundice, dark urine, intermittent RUQ pain  Poor appetite past few days, denies weight loss.   Elevated ALP, AST, ALT, T-bili-- cholestatic pattern  CT: Biliary obstruction, ddx ampullary mass, cholangiocarcinoma, pancreatic   - MRCP: 2 cm ampullary mass  - GI consulted for evaluation, EUS/ERCP pending   - hepatitis panel negative  - CA 19-9 Level of 56 (7/21)   - Bcx NGTD, Ucx contaminated    - Surg onc c/s: MRCP and ERCP significant for possible malignant duodenal mass. ERCP revealed diffuse biliary ductal dilation s/p placement of stent. Ca 19-9 of 56. Order CT Chest non-contrast and f/u ERCP bx results for possible Whipple Pruritis, pale stools, jaundice, dark urine, intermittent RUQ pain  Poor appetite past few days, denies weight loss.   Elevated ALP, AST, ALT, T-bili-- cholestatic pattern  CT: Biliary obstruction, ddx ampullary mass, cholangiocarcinoma, pancreatic   - MRCP: 2 cm ampullary mass  - GI consulted for evaluation, EUS/ERCP pending   - hepatitis panel negative  - CA 19-9 Level of 56 (7/21)   - Bcx NGTD, Ucx contaminated    - Surg onc c/s: MRCP and ERCP significant for possible malignant duodenal mass. ERCP revealed diffuse biliary ductal dilation s/p placement of stent. Ca 19-9 of 56. Order CT Chest non-contrast and f/u ERCP bx results for possible Whipple  - med onc: can f/u outpt for path report, check T bili 7/27 for downtrend (if plateaus/increases GI will need to reeval of stent or possible 2nd stent) Pruritis, pale stools, jaundice, dark urine, intermittent RUQ pain  Poor appetite past few days, denies weight loss.   Elevated ALP, AST, ALT, T-bili-- cholestatic pattern  CT: Biliary obstruction, ddx ampullary mass, cholangiocarcinoma, pancreatic   - MRCP: 2 cm ampullary mass  - GI consulted for evaluation, EUS/ERCP pending   - hepatitis panel negative  - CA 19-9 Level of 56 (7/21)   - Bcx NGTD, Ucx contaminated    - Surg onc c/s 7/26: MRCP and ERCP significant for possible malignant duodenal mass. ERCP revealed diffuse biliary ductal dilation s/p placement of stent. Ca 19-9 of 56. Ordered CT Chest non-contrast and f/u ERCP bx results, outpt surgery for whipple if candidate  - med onc 7/26: can f/u outpt for path report, check T bili 7/27 for downtrend (if plateaus/increases GI will need to reeval of stent or possible 2nd stent) Pruritis, pale stools, jaundice, dark urine, intermittent RUQ pain  Poor appetite past few days, denies weight loss.   Elevated ALP, AST, ALT, T-bili-- cholestatic pattern  CT: Biliary obstruction, ddx ampullary mass, cholangiocarcinoma, pancreatic   - MRCP: 2 cm ampullary mass  - GI consulted for evaluation, EUS/ERCP pending   - hepatitis panel negative  - CA 19-9 Level of 56 (7/21)   - Bcx NGTD, Ucx contaminated    - Surg onc c/s 7/26: MRCP and ERCP significant for possible malignant duodenal mass. ERCP revealed diffuse biliary ductal dilation s/p placement of stent. Ca 19-9 of 56. Ordered CT Chest non-contrast and f/u ERCP bx results, outpt surgery for whipple if candidate  - med onc 7/26: can f/u outpt for path report, check T bili 7/27 for downtrend (if plateaus/increases GI will need to reeval stent or 2nd stent)  - hem/onc 7/26: T. bili steadily increasing up to 10.4 until ERCP on 7/25; now starting to downtrend (8.3 today); if T. bili worsens may need re-evaluation of stent patency/additional stenting) Pruritis, pale stools, jaundice, dark urine, intermittent RUQ pain  Poor appetite past few days, denies weight loss.   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 ERCP revealed diffuse biliary ductal dilation s/p placement of stent. Ca 19-9 of 56. Ordered CT Chest non-contrast and f/u ERCP bx results, outpt surgery for whipple if candidate  - med onc 7/26: can f/u outpt for path report, check T bili 7/27 for downtrend (if plateaus/increases GI will need to reeval stent or 2nd stent)  - hem/onc 7/26: T. bili increasing up to 10.4 until ERCP on 7/25; now down 8.3; if T. bili worsens may need re-evaluation of stent/additional stent)

## 2022-07-26 NOTE — PROGRESS NOTE ADULT - PROBLEM SELECTOR PLAN 3
Hgb of 7.5 (7/24)   - transfused 1 unit pRBC on 7/24 per Cardiology recs for preop optimization   - f/u post transfusion CBC  Hgb of 11 as of March, 2022  Hb on arrival 7.9, MCV 96  No obvious signs of bleeding, hemodynamically stable, FOBT negative.  Reported melena 3 weeks ago, no PO iron use.  - empiric protonix 40 IV BID for now, plan to transition to PO on 7/25.   - monitor Hb, transfuse to Hb >7  - reticulocyte and ferritin levels in AM.   - B12 folate pending   - FOBT negative Hgb of 7.5 (7/24)   - transfused 1 unit pRBC on 7/24 per Cardiology recs for preop optimization   - f/u post transfusion CBC  Hgb 11 as of March, 2022, Hb on arrival 7.9, MCV 96  No obvious signs of bleeding, hemodynamically stable, FOBT negative.  Reported melena 3 weeks ago, no PO iron use.  - empiric protonix 40 IV BID for now, plan to transition to PO on 7/25.   - monitor Hb, transfuse to Hb >7  - reticulocyte and ferritin levels in AM.   - B12 folate pending   - FOBT negative Hgb of 7.5 (7/24)   - transfused 1 unit pRBC on 7/24 per Cardiology recs for preop optimization   - f/u post transfusion CBC  Hgb 11 as of March, 2022, Hb on arrival 7.9, MCV 96  No obvious signs of bleeding, hemodynamically stable, FOBT negative.  Reported melena 3 weeks ago, no PO iron use.  - empiric protonix 40 IV BID for now, plan to transition to PO on 7/25.   - monitor Hb, transfuse to Hb >7  - reticulocyte and ferritin levels in AM.   - B12 folate wnl  - FOBT negative

## 2022-07-26 NOTE — PROGRESS NOTE ADULT - ASSESSMENT
88yo F with PMH of HTN and HLD who presents with pruritus x 1 week and RUQ pain, found to have biliary dilatation concerning for obstruction.    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 S/p ERCP with stent placement, biopsies pending.  # Dyspnea and acute hypoxic respiratory failure - likely in the setting of hypervolemia, resolved  # Anemia - Normocytic. No overt blood loss.  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 Patient is a very active 88yo  - f/u pathology  - Pruritus is expected to improve within a few days  - Cholestyramine for pruritus: 4 g BID standing  - Diet as tolerated    Please note that the recommendations are not final until attested by an attending.    Thank you for involving us in the care of this patient. Please reach out if any further questions.    Eleazar Mark, PGY-6  Gastroenterology/Hepatology Fellow    Available on [employer] Teams  After 5PM/Weekends, please contact the on-call GI fellow: [phone]

## 2022-07-26 NOTE — PROGRESS NOTE ADULT - REASON FOR ADMISSION
Itching, orange urine, pale stools, RUQ abdominal pain Itching, orange urine, pale stools, RUQ abdominal pain.  duodenal/ampullary mass with obstructive jaundice.

## 2022-07-26 NOTE — PROVIDER CONTACT NOTE (OTHER) - REASON
Patient HR 46
Pt still c/o SOB on exertion, with wheezing.
pt has no relief with 2.5 mg oxycodone given
pt with cough and wheeze
pt complaining of abdominal pain radiating to back

## 2022-07-26 NOTE — PROGRESS NOTE ADULT - SUBJECTIVE AND OBJECTIVE BOX
GENERAL SURGERY PROGRESS NOTE    SUBJECTIVE  Patient seen and examined. No acute events overnight.         OBJECTIVE    PHYSICAL EXAM  General: Appears well, NAD  CHEST: breathing comfortably  CV: appears well perfused  Abdomen: soft, nontender, nondistended, no rebound or guarding  Extremities: Grossly symmetric    T(C): 36.4 (07-26-22 @ 03:24), Max: 36.8 (07-25-22 @ 12:24)  HR: 56 (07-26-22 @ 03:24) (48 - 60)  BP: 157/76 (07-26-22 @ 03:24) (125/72 - 168/70)  RR: 21 (07-26-22 @ 03:24) (12 - 21)  SpO2: 94% (07-26-22 @ 03:24) (92% - 96%)      MEDICATIONS  aluminum hydroxide/magnesium hydroxide/simethicone Suspension 30 milliLiter(s) Oral every 4 hours PRN  ATENolol  Tablet 25 milliGRAM(s) Oral daily  calamine/zinc oxide Lotion 1 Application(s) Topical four times a day PRN  cholestyramine Powder (Sugar-Free) 4 Gram(s) Oral two times a day PRN  heparin   Injectable 5000 Unit(s) SubCutaneous every 8 hours  lactated ringers. 500 milliLiter(s) IV Continuous <Continuous>  melatonin 3 milliGRAM(s) Oral at bedtime  pantoprazole  Injectable 40 milliGRAM(s) IV Push every 12 hours  senna 2 Tablet(s) Oral at bedtime  simethicone 80 milliGRAM(s) Chew four times a day  zolpidem 5 milliGRAM(s) Oral at bedtime PRN      LABS                        8.5    11.22 )-----------( 349      ( 25 Jul 2022 07:36 )             27.1     07-25    140  |  100  |  45<H>  ----------------------------<  152<H>  3.3<L>   |  25  |  2.42<H>    Ca    9.5      25 Jul 2022 07:29  Phos  3.1     07-25  Mg     2.0     07-25    TPro  6.9  /  Alb  3.5  /  TBili  10.4<H>  /  DBili  x   /  AST  109<H>  /  ALT  143<H>  /  AlkPhos  748<H>  07-25          RADIOLOGY & ADDITIONAL STUDIES

## 2022-07-26 NOTE — CONSULT NOTE ADULT - ATTENDING COMMENTS
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  CT reviewed - mass is resectable and there is no evidence of intra-abdominal metastases.  CT chest to complete staging.  Await pathology.  Will follow closely.
As above    Impression:    #1.  RUQ abd pain  #2.  New onset jaundice with pruritis  #3.  Biliary dilation with sharp cutoff in area of ampulla    Recommendations:    #1.  Await MRI/MRCP results.  #2.  Follow CBC/LFTs  #3.  NPO after MN (may have meds with small sips of water) for EGD/endoscopic ultrasound/ERCP on 7/21/22 in the afternoon.    Risks/benefits/alternatives discussed with patient.
Pt. with CAMRON vs CAMRON on CKD in the setting of obstructive jaundice, use of diuretics, IV contrast and decreased PO intake. UA with few RBCs (improving) and spot urine TP/Cr 0.2.  Pt. non oliguric, and Scr has been improving.   Noted to have bilateral densities, ?cysts on bilateral kidneys on CT abdomen. Recommend to check renal US.   Hold diuretics.   Monitor BMP. Strict I/Os. Avoid nephrotoxins.     Zainab Larson MD  Office : [phone]  Contact me on Microsoft Teams.
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 Discussed findings on CT with pt and son. CT chest is pending for staging. Path is still pending. Plan for monitoring for Tbili decrease, improvement in CAMRON, then d/c home. Will add pt for PMDC discussion/apt next tuesday. She is not sure about proceeding with surgery or chemotherapy. She asked appropriate questions re her prognosis which cannot be answered given no path available.

## 2022-07-27 LAB
ALBUMIN SERPL ELPH-MCNC: 2.9 G/DL — LOW (ref 3.3–5)
ALP SERPL-CCNC: 610 U/L — HIGH (ref 40–120)
ALT FLD-CCNC: 90 U/L — HIGH (ref 10–45)
ANION GAP SERPL CALC-SCNC: 14 MMOL/L — SIGNIFICANT CHANGE UP (ref 5–17)
AST SERPL-CCNC: 44 U/L — HIGH (ref 10–40)
BASOPHILS # BLD AUTO: 0.02 K/UL — SIGNIFICANT CHANGE UP (ref 0–0.2)
BASOPHILS NFR BLD AUTO: 0.3 % — SIGNIFICANT CHANGE UP (ref 0–2)
BILIRUB DIRECT SERPL-MCNC: 3.4 MG/DL — HIGH (ref 0–0.3)
BILIRUB INDIRECT FLD-MCNC: 1.5 MG/DL — HIGH (ref 0.2–1)
BILIRUB SERPL-MCNC: 4.9 MG/DL — HIGH (ref 0.2–1.2)
BILIRUB SERPL-MCNC: 4.9 MG/DL — HIGH (ref 0.2–1.2)
BUN SERPL-MCNC: 50 MG/DL — HIGH (ref 7–23)
CALCIUM SERPL-MCNC: 8.7 MG/DL — SIGNIFICANT CHANGE UP (ref 8.4–10.5)
CHLORIDE SERPL-SCNC: 106 MMOL/L — SIGNIFICANT CHANGE UP (ref 96–108)
CO2 SERPL-SCNC: 21 MMOL/L — LOW (ref 22–31)
CREAT SERPL-MCNC: 1.86 MG/DL — HIGH (ref 0.5–1.3)
EGFR: 26 ML/MIN/1.73M2 — LOW
EOSINOPHIL # BLD AUTO: 0.04 K/UL — SIGNIFICANT CHANGE UP (ref 0–0.5)
EOSINOPHIL NFR BLD AUTO: 0.6 % — SIGNIFICANT CHANGE UP (ref 0–6)
GLUCOSE SERPL-MCNC: 210 MG/DL — HIGH (ref 70–99)
HCT VFR BLD CALC: 25.8 % — LOW (ref 34.5–45)
HGB BLD-MCNC: 8.1 G/DL — LOW (ref 11.5–15.5)
IMM GRANULOCYTES NFR BLD AUTO: 0.9 % — SIGNIFICANT CHANGE UP (ref 0–1.5)
LYMPHOCYTES # BLD AUTO: 0.73 K/UL — LOW (ref 1–3.3)
LYMPHOCYTES # BLD AUTO: 10.6 % — LOW (ref 13–44)
MAGNESIUM SERPL-MCNC: 1.9 MG/DL — SIGNIFICANT CHANGE UP (ref 1.6–2.6)
MCHC RBC-ENTMCNC: 28.4 PG — SIGNIFICANT CHANGE UP (ref 27–34)
MCHC RBC-ENTMCNC: 31.4 GM/DL — LOW (ref 32–36)
MCV RBC AUTO: 90.5 FL — SIGNIFICANT CHANGE UP (ref 80–100)
MONOCYTES # BLD AUTO: 0.54 K/UL — SIGNIFICANT CHANGE UP (ref 0–0.9)
MONOCYTES NFR BLD AUTO: 7.8 % — SIGNIFICANT CHANGE UP (ref 2–14)
NEUTROPHILS # BLD AUTO: 5.51 K/UL — SIGNIFICANT CHANGE UP (ref 1.8–7.4)
NEUTROPHILS NFR BLD AUTO: 79.8 % — HIGH (ref 43–77)
NRBC # BLD: 0 /100 WBCS — SIGNIFICANT CHANGE UP (ref 0–0)
PHOSPHATE SERPL-MCNC: 3 MG/DL — SIGNIFICANT CHANGE UP (ref 2.5–4.5)
PLATELET # BLD AUTO: 294 K/UL — SIGNIFICANT CHANGE UP (ref 150–400)
POTASSIUM SERPL-MCNC: 3.8 MMOL/L — SIGNIFICANT CHANGE UP (ref 3.5–5.3)
POTASSIUM SERPL-SCNC: 3.8 MMOL/L — SIGNIFICANT CHANGE UP (ref 3.5–5.3)
PROT SERPL-MCNC: 6.2 G/DL — SIGNIFICANT CHANGE UP (ref 6–8.3)
RBC # BLD: 2.85 M/UL — LOW (ref 3.8–5.2)
RBC # FLD: 18 % — HIGH (ref 10.3–14.5)
SODIUM SERPL-SCNC: 141 MMOL/L — SIGNIFICANT CHANGE UP (ref 135–145)
SURGICAL PATHOLOGY STUDY: SIGNIFICANT CHANGE UP
WBC # BLD: 6.9 K/UL — SIGNIFICANT CHANGE UP (ref 3.8–10.5)
WBC # FLD AUTO: 6.9 K/UL — SIGNIFICANT CHANGE UP (ref 3.8–10.5)

## 2022-07-27 PROCEDURE — 93970 EXTREMITY STUDY: CPT | Mod: 26

## 2022-07-27 PROCEDURE — 99232 SBSQ HOSP IP/OBS MODERATE 35: CPT | Mod: GC

## 2022-07-27 PROCEDURE — 99231 SBSQ HOSP IP/OBS SF/LOW 25: CPT

## 2022-07-27 RX ADMIN — CALAMINE AND ZINC OXIDE AND PHENOL 1 APPLICATION(S): 160; 10 LOTION TOPICAL at 01:40

## 2022-07-27 RX ADMIN — CHOLESTYRAMINE 4 GRAM(S): 4 POWDER, FOR SUSPENSION ORAL at 12:57

## 2022-07-27 RX ADMIN — SIMETHICONE 80 MILLIGRAM(S): 80 TABLET, CHEWABLE ORAL at 06:00

## 2022-07-27 RX ADMIN — PANTOPRAZOLE SODIUM 40 MILLIGRAM(S): 20 TABLET, DELAYED RELEASE ORAL at 06:03

## 2022-07-27 RX ADMIN — HEPARIN SODIUM 5000 UNIT(S): 5000 INJECTION INTRAVENOUS; SUBCUTANEOUS at 06:03

## 2022-07-27 RX ADMIN — SENNA PLUS 2 TABLET(S): 8.6 TABLET ORAL at 21:18

## 2022-07-27 RX ADMIN — ATENOLOL 25 MILLIGRAM(S): 25 TABLET ORAL at 05:59

## 2022-07-27 RX ADMIN — Medication 3 MILLIGRAM(S): at 21:18

## 2022-07-27 RX ADMIN — HEPARIN SODIUM 5000 UNIT(S): 5000 INJECTION INTRAVENOUS; SUBCUTANEOUS at 14:27

## 2022-07-27 RX ADMIN — CHOLESTYRAMINE 4 GRAM(S): 4 POWDER, FOR SUSPENSION ORAL at 01:38

## 2022-07-27 RX ADMIN — HEPARIN SODIUM 5000 UNIT(S): 5000 INJECTION INTRAVENOUS; SUBCUTANEOUS at 21:19

## 2022-07-27 NOTE — PROGRESS NOTE ADULT - ASSESSMENT
89F PMHx of HTN, HLD presents withe one week of intermittment RUQ pain and obstructive jaundice. Inpatient workup with MRCP and ERCP significant for possible malignant duodenal mass. Ca 19-9 elevated to 56. Surgical oncology consulted for surgical evaluation of duodenal mass.     Plan:    - CT Chest negative for mets  - f/u ERCP biopsy results  - Medical and cardiac optimization for possible OR  - Possible Whipple pending biopsy results  - Surg onc to continue to follow    Red Team Surgery   [id number]

## 2022-07-27 NOTE — PROGRESS NOTE ADULT - SUBJECTIVE AND OBJECTIVE BOX
[facility] DIVISION OF KIDNEY DISEASES AND HYPERTENSION -- FOLLOW UP NOTE  --------------------------------------------------------------------------------  Chief Complaint:    24 hour events/subjective:        PAST HISTORY  --------------------------------------------------------------------------------  No significant changes to PMH, PSH, FHx, SHx, unless otherwise noted    ALLERGIES & MEDICATIONS  --------------------------------------------------------------------------------  Allergies    penicillin (Unknown)  tetracaine (Unknown)    Intolerances      Standing Inpatient Medications  heparin   Injectable 5000 Unit(s) SubCutaneous every 8 hours  lactated ringers. 500 milliLiter(s) IV Continuous <Continuous>  melatonin 3 milliGRAM(s) Oral at bedtime  pantoprazole    Tablet 40 milliGRAM(s) Oral before breakfast  senna 2 Tablet(s) Oral at bedtime  simethicone 80 milliGRAM(s) Chew four times a day    PRN Inpatient Medications  aluminum hydroxide/magnesium hydroxide/simethicone Suspension 30 milliLiter(s) Oral every 4 hours PRN  calamine/zinc oxide Lotion 1 Application(s) Topical four times a day PRN  cholestyramine Powder (Sugar-Free) 4 Gram(s) Oral two times a day PRN  zolpidem 5 milliGRAM(s) Oral at bedtime PRN      REVIEW OF SYSTEMS  --------------------------------------------------------------------------------  Gen: No weight changes, fatigue, fevers/chills, weakness  Skin: No rashes  Head/Eyes/Ears/Mouth: No headache; Normal hearing; Normal vision w/o blurriness; No sinus pain/discomfort, sore throat  Respiratory: No dyspnea, cough, wheezing, hemoptysis  CV: No chest pain, PND, orthopnea  GI: No abdominal pain, diarrhea, constipation, nausea, vomiting, melena, hematochezia  : No increased frequency, dysuria, hematuria, nocturia  MSK: No joint pain/swelling; no back pain; no edema  Neuro: No dizziness/lightheadedness, weakness, seizures, numbness, tingling  Heme: No easy bruising or bleeding  Endo: No heat/cold intolerance  Psych: No significant nervousness, anxiety, stress, depression    All other systems were reviewed and are negative, except as noted.    VITALS/PHYSICAL EXAM  --------------------------------------------------------------------------------  T(C): 36.7 (07-27-22 @ 04:02), Max: 36.7 (07-27-22 @ 04:02)  HR: 56 (07-27-22 @ 04:02) (47 - 56)  BP: 145/79 (07-27-22 @ 04:02) (114/70 - 145/79)  RR: 18 (07-27-22 @ 04:02) (18 - 18)  SpO2: 94% (07-27-22 @ 04:02) (94% - 95%)  Wt(kg): --  Height (cm): 162.6 (07-25-22 @ 15:15)  Weight (kg): 74.9 (07-25-22 @ 15:15)  BMI (kg/m2): 28.3 (07-25-22 @ 15:15)  BSA (m2): 1.8 (07-25-22 @ 15:15)      07-26-22 @ 07:01  -  07-27-22 @ 07:00  --------------------------------------------------------  IN: 600 mL / OUT: 0 mL / NET: 600 mL      LABS/STUDIES  --------------------------------------------------------------------------------              8.1    6.90  >-----------<  294      [07-27-22 @ 11:04]              25.8     141  |  106  |  50  ----------------------------<  210      [07-27-22 @ 11:04]  3.8   |  21  |  1.86        Ca     8.7     [07-27-22 @ 11:04]      Mg     1.9     [07-27-22 @ 11:04]      Phos  3.0     [07-27-22 @ 11:04]    TPro  6.2  /  Alb  2.9  /  TBili  4.9  /  DBili  3.4  /  AST  44  /  ALT  90  /  AlkPhos  610  [07-27-22 @ 11:04]          Creatinine Trend:  SCr 1.86 [07-27 @ 11:04]  SCr 2.51 [07-26 @ 11:22]  SCr 2.42 [07-25 @ 07:29]  SCr 2.94 [07-24 @ 07:17]  SCr 2.92 [07-23 @ 21:12]    Urinalysis - [07-23-22 @ 10:26]      Color Yellow / Appearance Clear / SG 1.012 / pH 5.0      Gluc Negative / Ketone Negative  / Bili Small / Urobili Negative       Blood Trace / Protein Trace / Leuk Est Negative / Nitrite Negative      RBC 6 / WBC 3 / Hyaline 2 / Gran  / Sq Epi  / Non Sq Epi 1 / Bacteria Negative    Urine Creatinine 75      [07-23-22 @ 10:26]  Urine Protein 17      [07-23-22 @ 10:26]  Urine Sodium 57      [07-23-22 @ 10:26]  Urine Urea Nitrogen 378      [07-23-22 @ 10:26]  Urine Potassium 37      [07-23-22 @ 10:26]  Urine Osmolality 179      [07-20-22 @ 18:53]    Iron 41, TIBC 312, %sat 13      [07-20-22 @ 06:58]  Ferritin 103      [07-25-22 @ 07:38]    HBsAg Nonreact      [07-19-22 @ 18:34]  HCV 0.11, Nonreact      [07-19-22 @ 18:34]

## 2022-07-27 NOTE — PROGRESS NOTE ADULT - PROBLEM SELECTOR PLAN 3
Hgb of 7.5 (7/24)   - transfused 1 unit pRBC on 7/24 per Cardiology recs for preop optimization   - f/u post transfusion CBC  Hgb 11 as of March, 2022, Hb on arrival 7.9, MCV 96  No obvious signs of bleeding, hemodynamically stable, FOBT negative.  Reported melena 3 weeks ago, no PO iron use.  - empiric protonix 40 IV BID for now, plan to transition to PO on 7/25.   - monitor Hb, transfuse to Hb >7  - reticulocyte and ferritin levels in AM.   - B12 folate wnl  - FOBT negative

## 2022-07-27 NOTE — PHYSICAL THERAPY INITIAL EVALUATION ADULT - ADDITIONAL COMMENTS
Patient lives alone in pvt house  3 steps to enter. Can function on one level inside. Patient ambulated without AD independent.

## 2022-07-27 NOTE — PROGRESS NOTE ADULT - PROBLEM SELECTOR PLAN 2
- Creatinine 2.9 (7/24) this AM, current downtrend but still elevated from admission level of 2.016  Previous creatinine of Cr 1.58 (2018) patient denies Hx CKD however. Of note has 5cm b/l renal cysts seen on CTAP.   - less likely contrast nephropathy due to <48 hour time window since receiving last contrast.   - avoid nephrotoxins  - hydrochlorothiazide 25 mg daily and losartan 100 mg daily currently held given CAMRON on CKD  - FENA of 1.8% (7/23) indicating intrinsic renal injury  - renal US demonstrating b/l cysts without hydronephrosis  - Neph: continue to monitor renal function panel and urine out put, order EDEL, dsDNA, anti smith, RF, ANCAs, anti GBM, c3,c4,RPR, immunoglobulin free light chains, Serum immunofixation to complete renal workup, dose all meds for GFR changes

## 2022-07-27 NOTE — PROGRESS NOTE ADULT - ASSESSMENT
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 CT chest negative for metastasis. US LE neg for DVT.

## 2022-07-27 NOTE — DIETITIAN INITIAL EVALUATION ADULT - ORAL INTAKE PTA/DIET HISTORY
PTA per pt  -Intake: good intake; consumes x2 small meals and x1 bigger meals/day; x1 Ensure/day   -Chewing/Swallowing: denies difficulty   -Allergies/Intolerances: none   -Vitamins/Supplements: multivitamin, vitamin D

## 2022-07-27 NOTE — DIETITIAN INITIAL EVALUATION ADULT - NS FNS DIET ORDER
Diet, Regular:   DASH/TLC {Sodium & Cholesterol Restricted} (DASH) (07-22-22 @ 16:17) [Active]

## 2022-07-27 NOTE — PROGRESS NOTE ADULT - SUBJECTIVE AND OBJECTIVE BOX
CARDIOLOGY     PROGRESS  NOTE   ________________________________________________    CHIEF COMPLAINT:Patient is a 89y old  Female who presents with a chief complaint of Itching, orange urine, pale stools, RUQ abdominal pain (26 Jul 2022 13:10)  no complain.  	  REVIEW OF SYSTEMS:  CONSTITUTIONAL: No fever, weight loss, or fatigue  EYES: No eye pain, visual disturbances, or discharge  ENT:  No difficulty hearing, tinnitus, vertigo; No sinus or throat pain  NECK: No pain or stiffness  RESPIRATORY: No cough, wheezing, chills or hemoptysis; No Shortness of Breath  CARDIOVASCULAR: No chest pain, palpitations, passing out, dizziness, or leg swelling  GASTROINTESTINAL: No abdominal or epigastric pain. No nausea, vomiting, or hematemesis; No diarrhea or constipation. No melena or hematochezia.  GENITOURINARY: No dysuria, frequency, hematuria, or incontinence  NEUROLOGICAL: No headaches, memory loss, loss of strength, numbness, or tremors  SKIN: No itching, burning, rashes, or lesions   LYMPH Nodes: No enlarged glands  ENDOCRINE: No heat or cold intolerance; No hair loss  MUSCULOSKELETAL: No joint pain or swelling; No muscle, back, or extremity pain  PSYCHIATRIC: No depression, anxiety, mood swings, or difficulty sleeping  HEME/LYMPH: No easy bruising, or bleeding gums  ALLERGY AND IMMUNOLOGIC: No hives or eczema	    [ ] All others negative	  [ ] Unable to obtain    PHYSICAL EXAM:  T(C): 36.7 (07-27-22 @ 04:02), Max: 36.7 (07-27-22 @ 04:02)  HR: 56 (07-27-22 @ 04:02) (47 - 58)  BP: 145/79 (07-27-22 @ 04:02) (114/70 - 145/79)  RR: 18 (07-27-22 @ 04:02) (18 - 19)  SpO2: 94% (07-27-22 @ 04:02) (94% - 97%)  Wt(kg): --  I&O's Summary    26 Jul 2022 07:01  -  27 Jul 2022 07:00  --------------------------------------------------------  IN: 600 mL / OUT: 0 mL / NET: 600 mL        Appearance: Normal	  HEENT:   Normal oral mucosa, PERRL, EOMI	  Lymphatic: No lymphadenopathy  Cardiovascular: Normal S1 S2, No JVD, +murmurs, No edema  Respiratory: Lungs clear to auscultation	  Psychiatry: A & O x 3, Mood & affect appropriate  Gastrointestinal:  Soft, Non-tender, + BS	  Skin: No rashes, No ecchymoses, No cyanosis	  Neurologic: Non-focal  Extremities: Normal range of motion, No clubbing, cyanosis or edema  Vascular: Peripheral pulses palpable 2+ bilaterally    MEDICATIONS  (STANDING):  ATENolol  Tablet 25 milliGRAM(s) Oral daily  heparin   Injectable 5000 Unit(s) SubCutaneous every 8 hours  lactated ringers. 500 milliLiter(s) (30 mL/Hr) IV Continuous <Continuous>  melatonin 3 milliGRAM(s) Oral at bedtime  pantoprazole    Tablet 40 milliGRAM(s) Oral before breakfast  senna 2 Tablet(s) Oral at bedtime  simethicone 80 milliGRAM(s) Chew four times a day      TELEMETRY: 	    ECG:  	  RADIOLOGY:  OTHER: 	  	  LABS:	 	    CARDIAC MARKERS:                                8.1    8.12  )-----------( 321      ( 26 Jul 2022 11:22 )             25.8     07-26    141  |  102  |  54<H>  ----------------------------<  157<H>  3.5   |  24  |  2.51<H>    Ca    9.3      26 Jul 2022 11:22  Phos  3.6     07-26  Mg     2.1     07-26    TPro  x   /  Alb  x   /  TBili  8.0<H>  /  DBili  6.1<H>  /  AST  x   /  ALT  x   /  AlkPhos  x   07-26    proBNP:   Lipid Profile:   HgA1c:   TSH:     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 S/p ERCP with stent placement, biopsies pending.  # Dyspnea and acute hypoxic respiratory failure - likely in the setting of hypervolemia, resolved  # Anemia - Normocytic. No overt blood loss.  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 Patient is a very active 89yo  - f/u pathology  - Pruritus is expected to improve within a few days  - Cholestyramine for pruritus: 4 g BID standing  - Diet as tolerated  < from: Transthoracic Echocardiogram (07.22.22 @ 12:10) >  Mitral Valve: Mitral annular calcification. Mild mitral  regurgitation.  Aortic Valve/Aorta: Calcified aortic valve with normal  opening. Mild aortic regurgitation.  Normal aortic root size.  Left Atrium: Moderately dilated left atrium.  Left Ventricle: Normal left ventricular internal dimensions  and wall thickness.  Normal left ventricular systolic function. No segmental  wall motion abnormalities.  Left ventricular filling pressure is elevated.  Right Heart: Normal right atrium. Normal right ventricular  size and function.  Normal tricuspid valve. Minimal tricuspid regurgitation.  Normal pulmonic valve. Mild pulmonic regurgitation.  Pericardium/Pleura: Normal pericardium with no pericardial  effusion.  Hemodynamic: Estimated right atrial pressure is normal.  Mild-moderate pulmonary hypertension. Estimated PASP 50  mmHg.  ------------------------------------------------------------------------  Conclusions:  Normal left ventricular systolic function. No segmental  wall motion abnormalities.  Mild-moderate pulmonary hypertension.        Assessment and plan  ---------------------------  89yof with HTN and HLD presented to outpatient doctor with 1 week of itching, orange urine, pale stools, and intermittent RUQ abdominal pain sent in by PCP for concern for malignancy. Patient reports the abdominal pain was more like a soreness, intermittent, 3/10 in severity. 1 episode of NBNB vomiting, patient attributed to reflux. Patient did report dark stool about 3 weeks prior to admission, denies PO iron use. Does report to be anemic, but denies dizziness, weakness, lightheadedness. Denies any history of kidney disease. Does report decreased appetite over past 4 days, decreased hydration as well. Patient had colonoscopy she thinks 4-5y ago, she believes also had endoscopy then, reports everything was normal. Did receive appropriate screening mammograms. Of note she did have muscles in a restaurant 10d ago, which is 3d prior to symptom onset. Denies fevers, but endorses chills. Denies hematemesis, hematuria, dysuria, urinary frequency.   pt with hx of htn , ckd stage 3, with RUQ abdominal pain.  pt denies of any cardiac hx , with LBBB ?old, echo with normal EF and no WMA with mod pulmonary htn  transfuse one unit of prbc, Lasix one dose post transfusion  fu lkft post ERCP  pulmonary HTN ?etiology, check le venous doppler  ercp noted  ddc atenolol add norvasc 5 mg daily  dvt proiphylaxis

## 2022-07-27 NOTE — PROGRESS NOTE ADULT - SUBJECTIVE AND OBJECTIVE BOX
***************************************************************  Brooke Bruce MD (PGY-1)  Internal Medicine  Pager: [phone]  ***************************************************************    PROGRESS NOTE:     Patient is a 89y old  Female who presents with a chief complaint of Itching, orange urine, pale stools, RUQ abdominal pain (27 Jul 2022 07:13)      SUBJECTIVE / OVERNIGHT EVENTS: Patient seen and examined at bedside. No acute overnight events. This morning, the patient is comfortable and doing well. No acute complaints. Denies fevers, chills, N/V/D, chest pain, SOB, abdominal pain.    MEDICATIONS  (STANDING):  heparin   Injectable 5000 Unit(s) SubCutaneous every 8 hours  lactated ringers. 500 milliLiter(s) (30 mL/Hr) IV Continuous <Continuous>  melatonin 3 milliGRAM(s) Oral at bedtime  pantoprazole    Tablet 40 milliGRAM(s) Oral before breakfast  senna 2 Tablet(s) Oral at bedtime  simethicone 80 milliGRAM(s) Chew four times a day    MEDICATIONS  (PRN):  aluminum hydroxide/magnesium hydroxide/simethicone Suspension 30 milliLiter(s) Oral every 4 hours PRN Dyspepsia  calamine/zinc oxide Lotion 1 Application(s) Topical four times a day PRN Itching  cholestyramine Powder (Sugar-Free) 4 Gram(s) Oral two times a day PRN itching  zolpidem 5 milliGRAM(s) Oral at bedtime PRN Insomnia      CAPILLARY BLOOD GLUCOSE        I&O's Summary    26 Jul 2022 07:01  -  27 Jul 2022 07:00  --------------------------------------------------------  IN: 600 mL / OUT: 0 mL / NET: 600 mL        PHYSICAL EXAM:  Vital Signs Last 24 Hrs  T(C): 36.7 (27 Jul 2022 04:02), Max: 36.7 (27 Jul 2022 04:02)  T(F): 98 (27 Jul 2022 04:02), Max: 98 (27 Jul 2022 04:02)  HR: 56 (27 Jul 2022 04:02) (47 - 58)  BP: 145/79 (27 Jul 2022 04:02) (114/70 - 145/79)  BP(mean): --  RR: 18 (27 Jul 2022 04:02) (18 - 19)  SpO2: 94% (27 Jul 2022 04:02) (94% - 97%)    Parameters below as of 27 Jul 2022 04:02  Patient On (Oxygen Delivery Method): room air        GENERAL: NAD, lying in bed comfortably  HEAD: Atraumatic, normocephalic  EYES: EOMI, PERRLA, conjunctiva and sclera clear  ENT: Moist mucous membranes  NECK: Supple, no JVD  HEART: S1, S2, Regular rate and rhythm, no murmurs, rubs, or gallops  LUNGS: Unlabored respirations, clear to auscultation bilaterally, no crackles, wheezing, or rhonchi  ABDOMEN: Soft, nontender, nondistended, +BS  EXTREMITIES: 2+ peripheral pulses bilaterally. No clubbing, cyanosis, or edema  NERVOUS SYSTEM:  A&Ox3, no focal deficits   SKIN: No rashes or lesions    LABS:                        8.1    8.12  )-----------( 321      ( 26 Jul 2022 11:22 )             25.8     07-26    141  |  102  |  54<H>  ----------------------------<  157<H>  3.5   |  24  |  2.51<H>    Ca    9.3      26 Jul 2022 11:22  Phos  3.6     07-26  Mg     2.1     07-26    TPro  x   /  Alb  x   /  TBili  8.0<H>  /  DBili  6.1<H>  /  AST  x   /  ALT  x   /  AlkPhos  x   07-26                RADIOLOGY & ADDITIONAL TESTS:  Results Reviewed:   Imaging Personally Reviewed:  Electrocardiogram Personally Reviewed:    COORDINATION OF CARE:  Care Discussed with Consultants/Other Providers [Y/N]:  Prior or Outpatient Records Reviewed [Y/N]:   ***************************************************************  Brooke Bruce MD (PGY-1)  Internal Medicine  Pager: [phone]  ***************************************************************    PROGRESS NOTE:     Patient is a 89y old  Female who presents with a chief complaint of Itching, orange urine, pale stools, RUQ abdominal pain (27 Jul 2022 07:13)      SUBJECTIVE / OVERNIGHT EVENTS: Patient seen and examined at bedside. No acute overnight events. This morning, the patient reports itching but denies fevers, chills, N/V/D, chest pain, SOB, abdominal pain.    MEDICATIONS  (STANDING):  heparin   Injectable 5000 Unit(s) SubCutaneous every 8 hours  lactated ringers. 500 milliLiter(s) (30 mL/Hr) IV Continuous <Continuous>  melatonin 3 milliGRAM(s) Oral at bedtime  pantoprazole    Tablet 40 milliGRAM(s) Oral before breakfast  senna 2 Tablet(s) Oral at bedtime  simethicone 80 milliGRAM(s) Chew four times a day    MEDICATIONS  (PRN):  aluminum hydroxide/magnesium hydroxide/simethicone Suspension 30 milliLiter(s) Oral every 4 hours PRN Dyspepsia  calamine/zinc oxide Lotion 1 Application(s) Topical four times a day PRN Itching  cholestyramine Powder (Sugar-Free) 4 Gram(s) Oral two times a day PRN itching  zolpidem 5 milliGRAM(s) Oral at bedtime PRN Insomnia      CAPILLARY BLOOD GLUCOSE        I&O's Summary    26 Jul 2022 07:01  -  27 Jul 2022 07:00  --------------------------------------------------------  IN: 600 mL / OUT: 0 mL / NET: 600 mL        PHYSICAL EXAM:  Vital Signs Last 24 Hrs  T(C): 36.7 (27 Jul 2022 04:02), Max: 36.7 (27 Jul 2022 04:02)  T(F): 98 (27 Jul 2022 04:02), Max: 98 (27 Jul 2022 04:02)  HR: 56 (27 Jul 2022 04:02) (47 - 58)  BP: 145/79 (27 Jul 2022 04:02) (114/70 - 145/79)  BP(mean): --  RR: 18 (27 Jul 2022 04:02) (18 - 19)  SpO2: 94% (27 Jul 2022 04:02) (94% - 97%)    Parameters below as of 27 Jul 2022 04:02  Patient On (Oxygen Delivery Method): room air    Constitutional: NAD  HEENT: non-icteric sclera, no conjunctival pallor appreciated  Respiratory: CTA b/l, no labored breathing  Cardiovascular: RRR, normal S1S2  Gastrointestinal: soft, NTND, no masses palpable  Extremities: Warm, well perfused, no edema appreciated in the b/l lower extremities.   Neurological: AAOx3  Skin: Normal temperature, warm, dry    LABS:                        8.1    8.12  )-----------( 321      ( 26 Jul 2022 11:22 )             25.8     07-26    141  |  102  |  54<H>  ----------------------------<  157<H>  3.5   |  24  |  2.51<H>    Ca    9.3      26 Jul 2022 11:22  Phos  3.6     07-26  Mg     2.1     07-26    TPro  x   /  Alb  x   /  TBili  8.0<H>  /  DBili  6.1<H>  /  AST  x   /  ALT  x   /  AlkPhos  x   07-26      RADIOLOGY & ADDITIONAL TESTS:  Results Reviewed:   Imaging Personally Reviewed:  Electrocardiogram Personally Reviewed:    COORDINATION OF CARE:  Care Discussed with Consultants/Other Providers [Y/N]:  Prior or Outpatient Records Reviewed [Y/N]:

## 2022-07-27 NOTE — DIETITIAN INITIAL EVALUATION ADULT - OTHER INFO
Influenza A
GI/Intake:   -Per flowsheet, % intake of meals   -Denies supplements in-house   -Last BM documented 7/20; bowel regimen ordered (Senna)   -Noted with elevated bilirubin and LFTs  -Presenting with obstructive Jaundice; ERCP appearing malignant duodenal mass; Onc following     Renal:  -CAMRON on CKD   -Lactated ringers ordered for maintenance fluids     -Pt denies weight changes

## 2022-07-27 NOTE — DIETITIAN INITIAL EVALUATION ADULT - REASON FOR ADMISSION
89yo Male with PMH of HTN, GERD, HLD. Pt admitted on 7/19 with Obstruction of bile duct. S/p ERCP showing malignant appearing duodenal mass. D/c planning in place for out-patient treatment.

## 2022-07-27 NOTE — DIETITIAN INITIAL EVALUATION ADULT - ADD RECOMMEND
1) Continue DASH/TLC diet   2) Monitor PO intake, diet tolerance, weight trends, labs, GI function, and skin integrity

## 2022-07-27 NOTE — PHYSICAL THERAPY INITIAL EVALUATION ADULT - PERTINENT HX OF CURRENT PROBLEM, REHAB EVAL
89F PMHx of HTN, HLD presents withe one week of intermittment RUQ pain and obstructive jaundice. Inpatient workup with MRCP 7/20 and ERCP significant for possible malignant duodenal mass. 89F PMHx of HTN, HLD presents withe one week of intermittment RUQ pain and obstructive jaundice. Inpatient workup with MRCP 7/20 and ERCP significant for possible malignant duodenal mass. venous duplex le /l- no e/o dvt. Surgical oncology consulted for surgical evaluation of duodenal mass. Possible Whipple pending biopsy results. per medicine team that patient has pancreatic CA and does not want to pursue treatment.

## 2022-07-27 NOTE — DIETITIAN INITIAL EVALUATION ADULT - PERTINENT LABORATORY DATA
07-27    141  |  106  |  50<H>  ----------------------------<  210<H>  3.8   |  21<L>  |  1.86<H>    Ca    8.7      27 Jul 2022 11:04  Phos  3.0     07-27  Mg     1.9     07-27    TPro  6.2  /  Alb  2.9<L>  /  TBili  4.9<H>  /  DBili  3.4<H>  /  AST  44<H>  /  ALT  90<H>  /  AlkPhos  610<H>  07-27

## 2022-07-27 NOTE — PROGRESS NOTE ADULT - PROBLEM SELECTOR PLAN 1
Pruritis, pale stools, jaundice, dark urine, intermittent RUQ pain  Poor appetite past few days, denies weight loss.   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 ERCP revealed diffuse biliary ductal dilation s/p placement of stent. Ca 19-9 of 56. Ordered CT Chest non-contrast and f/u ERCP bx results, outpt surgery for whipple if candidate  - med onc 7/26: can f/u outpt for path report, check T bili 7/27 for downtrend (if plateaus/increases GI will need to reeval stent or 2nd stent)  - hem/onc 7/26: T. bili increasing up to 10.4 until ERCP on 7/25; now down 8.3; if T. bili worsens may need re-evaluation of stent/additional stent) Pruritis, pale stools, jaundice, dark urine, intermittent RUQ pain  Poor appetite past few days, denies weight loss.   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 ERCP revealed diffuse biliary ductal dilation s/p placement of stent. Ca 19-9 of 56. Ordered CT Chest non-contrast and f/u ERCP bx results, outpt surgery for whipple if candidate  - med onc 7/26: can f/u outpt for path report, check T bili 7/27 for downtrend (if plateaus/increases GI will need to reeval stent or 2nd stent)  - hem/onc 7/26: T. bili increasing up to 10.4 until ERCP on 7/25; now down 8.3; if T. 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

## 2022-07-27 NOTE — PHYSICAL THERAPY INITIAL EVALUATION ADULT - TRANSFER TRAINING, PT EVAL
GOAL: Pt will perform sit to/from stand transfers independently w/o AD vs w least restrictive AD within 2-3 weeks.

## 2022-07-27 NOTE — PHYSICAL THERAPY INITIAL EVALUATION ADULT - PREDICTED DURATION OF THERAPY (DAYS/WKS), PT EVAL
2 weeks. O-T Advancement Flap Text: The defect edges were debeveled with a #15 scalpel blade.  Given the location of the defect, shape of the defect and the proximity to free margins an O-T advancement flap was deemed most appropriate.  Using a sterile surgical marker, an appropriate advancement flap was drawn incorporating the defect and placing the expected incisions within the relaxed skin tension lines where possible.    The area thus outlined was incised deep to adipose tissue with a #15 scalpel blade.  The skin margins were undermined to an appropriate distance in all directions utilizing iris scissors.

## 2022-07-27 NOTE — PROGRESS NOTE ADULT - ASSESSMENT
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 Unclear etiology,   Pt. with CAMRON vs CAMRON on CKD in the setting of obstructive jaundice, use of diuretics, IV contrast and decreased PO intake. UA with few RBCs (improving) and spot urine TP/Cr 0.2.  Pt. non oliguric, and Scr has been improving.   Renal sonogram showing chronic disease with cysts noted    Acute kidney injury superimposed on CKD.   Unknown baseline creatinine function  Creatinine now downtrending to 1.8mg/dl from peak of 3.29   UA: no hematuria  and Uprot/cr: 0.2   Please continue to monitor renal function panel as well as urine out put  Please avoid nephrotoxic medications   Patient euvolemic without need for fluids or diuresis   Will need close monitoring as planning potential Whipple's procedure eventually, pending bx findings but pt may want to go hospice instead  Will need close outpatient nephrology f.u if discharged-- Dr Norbert Castro will see her on August 4th 2022 at the office - appt made at [address] [place] [id number]    Keshav Stein MD  Pager   Office     Contact me directly via [employer] Teams   (After 5 pm or on weekends please page the on-call fellow/attending, can check [email or web address] for schedule. Login is eduardo lindsay, schedule under [facility] medicine, psych, derm)

## 2022-07-27 NOTE — DIETITIAN INITIAL EVALUATION ADULT - PERTINENT MEDS FT
MEDICATIONS  (STANDING):  heparin   Injectable 5000 Unit(s) SubCutaneous every 8 hours  lactated ringers. 500 milliLiter(s) (30 mL/Hr) IV Continuous <Continuous>  melatonin 3 milliGRAM(s) Oral at bedtime  pantoprazole    Tablet 40 milliGRAM(s) Oral before breakfast  senna 2 Tablet(s) Oral at bedtime  simethicone 80 milliGRAM(s) Chew four times a day    MEDICATIONS  (PRN):  aluminum hydroxide/magnesium hydroxide/simethicone Suspension 30 milliLiter(s) Oral every 4 hours PRN Dyspepsia  calamine/zinc oxide Lotion 1 Application(s) Topical four times a day PRN Itching  cholestyramine Powder (Sugar-Free) 4 Gram(s) Oral two times a day PRN itching  zolpidem 5 milliGRAM(s) Oral at bedtime PRN Insomnia

## 2022-07-28 ENCOUNTER — TRANSCRIPTION ENCOUNTER (OUTPATIENT)
Age: 87
End: 2022-07-28

## 2022-07-28 VITALS
DIASTOLIC BLOOD PRESSURE: 66 MMHG | HEART RATE: 56 BPM | SYSTOLIC BLOOD PRESSURE: 146 MMHG | OXYGEN SATURATION: 95 % | RESPIRATION RATE: 18 BRPM | TEMPERATURE: 98 F

## 2022-07-28 LAB
ALBUMIN SERPL ELPH-MCNC: 3.1 G/DL — LOW (ref 3.3–5)
ALP SERPL-CCNC: 572 U/L — HIGH (ref 40–120)
ALT FLD-CCNC: 75 U/L — HIGH (ref 10–45)
ANION GAP SERPL CALC-SCNC: 12 MMOL/L — SIGNIFICANT CHANGE UP (ref 5–17)
AST SERPL-CCNC: 34 U/L — SIGNIFICANT CHANGE UP (ref 10–40)
BASOPHILS # BLD AUTO: 0.04 K/UL — SIGNIFICANT CHANGE UP (ref 0–0.2)
BASOPHILS NFR BLD AUTO: 0.7 % — SIGNIFICANT CHANGE UP (ref 0–2)
BILIRUB SERPL-MCNC: 4.1 MG/DL — HIGH (ref 0.2–1.2)
BUN SERPL-MCNC: 42 MG/DL — HIGH (ref 7–23)
CALCIUM SERPL-MCNC: 8.8 MG/DL — SIGNIFICANT CHANGE UP (ref 8.4–10.5)
CHLORIDE SERPL-SCNC: 110 MMOL/L — HIGH (ref 96–108)
CO2 SERPL-SCNC: 22 MMOL/L — SIGNIFICANT CHANGE UP (ref 22–31)
CREAT SERPL-MCNC: 1.66 MG/DL — HIGH (ref 0.5–1.3)
EGFR: 29 ML/MIN/1.73M2 — LOW
EOSINOPHIL # BLD AUTO: 0.04 K/UL — SIGNIFICANT CHANGE UP (ref 0–0.5)
EOSINOPHIL NFR BLD AUTO: 0.7 % — SIGNIFICANT CHANGE UP (ref 0–6)
GLUCOSE SERPL-MCNC: 139 MG/DL — HIGH (ref 70–99)
HCT VFR BLD CALC: 25.8 % — LOW (ref 34.5–45)
HGB BLD-MCNC: 8.2 G/DL — LOW (ref 11.5–15.5)
IMM GRANULOCYTES NFR BLD AUTO: 0.7 % — SIGNIFICANT CHANGE UP (ref 0–1.5)
LYMPHOCYTES # BLD AUTO: 0.77 K/UL — LOW (ref 1–3.3)
LYMPHOCYTES # BLD AUTO: 12.6 % — LOW (ref 13–44)
MAGNESIUM SERPL-MCNC: 1.9 MG/DL — SIGNIFICANT CHANGE UP (ref 1.6–2.6)
MCHC RBC-ENTMCNC: 29.2 PG — SIGNIFICANT CHANGE UP (ref 27–34)
MCHC RBC-ENTMCNC: 31.8 GM/DL — LOW (ref 32–36)
MCV RBC AUTO: 91.8 FL — SIGNIFICANT CHANGE UP (ref 80–100)
MONOCYTES # BLD AUTO: 0.59 K/UL — SIGNIFICANT CHANGE UP (ref 0–0.9)
MONOCYTES NFR BLD AUTO: 9.6 % — SIGNIFICANT CHANGE UP (ref 2–14)
NEUTROPHILS # BLD AUTO: 4.64 K/UL — SIGNIFICANT CHANGE UP (ref 1.8–7.4)
NEUTROPHILS NFR BLD AUTO: 75.7 % — SIGNIFICANT CHANGE UP (ref 43–77)
NRBC # BLD: 0 /100 WBCS — SIGNIFICANT CHANGE UP (ref 0–0)
PHOSPHATE SERPL-MCNC: 3.1 MG/DL — SIGNIFICANT CHANGE UP (ref 2.5–4.5)
PLATELET # BLD AUTO: 280 K/UL — SIGNIFICANT CHANGE UP (ref 150–400)
POTASSIUM SERPL-MCNC: 3.5 MMOL/L — SIGNIFICANT CHANGE UP (ref 3.5–5.3)
POTASSIUM SERPL-SCNC: 3.5 MMOL/L — SIGNIFICANT CHANGE UP (ref 3.5–5.3)
PROT SERPL-MCNC: 6.3 G/DL — SIGNIFICANT CHANGE UP (ref 6–8.3)
RBC # BLD: 2.81 M/UL — LOW (ref 3.8–5.2)
RBC # FLD: 18.1 % — HIGH (ref 10.3–14.5)
SODIUM SERPL-SCNC: 144 MMOL/L — SIGNIFICANT CHANGE UP (ref 135–145)
WBC # BLD: 6.12 K/UL — SIGNIFICANT CHANGE UP (ref 3.8–10.5)
WBC # FLD AUTO: 6.12 K/UL — SIGNIFICANT CHANGE UP (ref 3.8–10.5)

## 2022-07-28 PROCEDURE — 84540 ASSAY OF URINE/UREA-N: CPT

## 2022-07-28 PROCEDURE — 81001 URINALYSIS AUTO W/SCOPE: CPT

## 2022-07-28 PROCEDURE — 83550 IRON BINDING TEST: CPT

## 2022-07-28 PROCEDURE — 86301 IMMUNOASSAY TUMOR CA 19-9: CPT

## 2022-07-28 PROCEDURE — 83615 LACTATE (LD) (LDH) ENZYME: CPT

## 2022-07-28 PROCEDURE — 99232 SBSQ HOSP IP/OBS MODERATE 35: CPT | Mod: GC

## 2022-07-28 PROCEDURE — 85045 AUTOMATED RETICULOCYTE COUNT: CPT

## 2022-07-28 PROCEDURE — 85025 COMPLETE CBC W/AUTO DIFF WBC: CPT

## 2022-07-28 PROCEDURE — 71250 CT THORAX DX C-: CPT

## 2022-07-28 PROCEDURE — 85610 PROTHROMBIN TIME: CPT

## 2022-07-28 PROCEDURE — 36415 COLL VENOUS BLD VENIPUNCTURE: CPT

## 2022-07-28 PROCEDURE — 84295 ASSAY OF SERUM SODIUM: CPT

## 2022-07-28 PROCEDURE — P9016: CPT

## 2022-07-28 PROCEDURE — 87086 URINE CULTURE/COLONY COUNT: CPT

## 2022-07-28 PROCEDURE — 74181 MRI ABDOMEN W/O CONTRAST: CPT

## 2022-07-28 PROCEDURE — 84156 ASSAY OF PROTEIN URINE: CPT

## 2022-07-28 PROCEDURE — 97161 PT EVAL LOW COMPLEX 20 MIN: CPT

## 2022-07-28 PROCEDURE — U0003: CPT

## 2022-07-28 PROCEDURE — 80074 ACUTE HEPATITIS PANEL: CPT

## 2022-07-28 PROCEDURE — 80048 BASIC METABOLIC PNL TOTAL CA: CPT

## 2022-07-28 PROCEDURE — 82272 OCCULT BLD FECES 1-3 TESTS: CPT

## 2022-07-28 PROCEDURE — 86850 RBC ANTIBODY SCREEN: CPT

## 2022-07-28 PROCEDURE — 82947 ASSAY GLUCOSE BLOOD QUANT: CPT

## 2022-07-28 PROCEDURE — 88305 TISSUE EXAM BY PATHOLOGIST: CPT

## 2022-07-28 PROCEDURE — 82378 CARCINOEMBRYONIC ANTIGEN: CPT

## 2022-07-28 PROCEDURE — C9399: CPT

## 2022-07-28 PROCEDURE — 86304 IMMUNOASSAY TUMOR CA 125: CPT

## 2022-07-28 PROCEDURE — 82746 ASSAY OF FOLIC ACID SERUM: CPT

## 2022-07-28 PROCEDURE — 83010 ASSAY OF HAPTOGLOBIN QUANT: CPT

## 2022-07-28 PROCEDURE — 83540 ASSAY OF IRON: CPT

## 2022-07-28 PROCEDURE — 84132 ASSAY OF SERUM POTASSIUM: CPT

## 2022-07-28 PROCEDURE — 36430 TRANSFUSION BLD/BLD COMPNT: CPT

## 2022-07-28 PROCEDURE — 84133 ASSAY OF URINE POTASSIUM: CPT

## 2022-07-28 PROCEDURE — C1874: CPT

## 2022-07-28 PROCEDURE — 86900 BLOOD TYPING SEROLOGIC ABO: CPT

## 2022-07-28 PROCEDURE — 84100 ASSAY OF PHOSPHORUS: CPT

## 2022-07-28 PROCEDURE — 76775 US EXAM ABDO BACK WALL LIM: CPT

## 2022-07-28 PROCEDURE — 82570 ASSAY OF URINE CREATININE: CPT

## 2022-07-28 PROCEDURE — 86923 COMPATIBILITY TEST ELECTRIC: CPT

## 2022-07-28 PROCEDURE — 71045 X-RAY EXAM CHEST 1 VIEW: CPT

## 2022-07-28 PROCEDURE — 93306 TTE W/DOPPLER COMPLETE: CPT

## 2022-07-28 PROCEDURE — 83935 ASSAY OF URINE OSMOLALITY: CPT

## 2022-07-28 PROCEDURE — 82247 BILIRUBIN TOTAL: CPT

## 2022-07-28 PROCEDURE — 74330 X-RAY BILE/PANC ENDOSCOPY: CPT

## 2022-07-28 PROCEDURE — 96374 THER/PROPH/DIAG INJ IV PUSH: CPT

## 2022-07-28 PROCEDURE — 86901 BLOOD TYPING SEROLOGIC RH(D): CPT

## 2022-07-28 PROCEDURE — 85027 COMPLETE CBC AUTOMATED: CPT

## 2022-07-28 PROCEDURE — 82248 BILIRUBIN DIRECT: CPT

## 2022-07-28 PROCEDURE — 80053 COMPREHEN METABOLIC PANEL: CPT

## 2022-07-28 PROCEDURE — 82435 ASSAY OF BLOOD CHLORIDE: CPT

## 2022-07-28 PROCEDURE — 83605 ASSAY OF LACTIC ACID: CPT

## 2022-07-28 PROCEDURE — 93970 EXTREMITY STUDY: CPT

## 2022-07-28 PROCEDURE — 99285 EMERGENCY DEPT VISIT HI MDM: CPT | Mod: 25

## 2022-07-28 PROCEDURE — 85014 HEMATOCRIT: CPT

## 2022-07-28 PROCEDURE — 82803 BLOOD GASES ANY COMBINATION: CPT

## 2022-07-28 PROCEDURE — 87040 BLOOD CULTURE FOR BACTERIA: CPT

## 2022-07-28 PROCEDURE — 0225U NFCT DS DNA&RNA 21 SARSCOV2: CPT

## 2022-07-28 PROCEDURE — 99239 HOSP IP/OBS DSCHRG MGMT >30: CPT

## 2022-07-28 PROCEDURE — C1769: CPT

## 2022-07-28 PROCEDURE — 82728 ASSAY OF FERRITIN: CPT

## 2022-07-28 PROCEDURE — 84300 ASSAY OF URINE SODIUM: CPT

## 2022-07-28 PROCEDURE — 85018 HEMOGLOBIN: CPT

## 2022-07-28 PROCEDURE — 82330 ASSAY OF CALCIUM: CPT

## 2022-07-28 PROCEDURE — 83735 ASSAY OF MAGNESIUM: CPT

## 2022-07-28 PROCEDURE — U0005: CPT

## 2022-07-28 PROCEDURE — 83690 ASSAY OF LIPASE: CPT

## 2022-07-28 PROCEDURE — 82607 VITAMIN B-12: CPT

## 2022-07-28 PROCEDURE — 74177 CT ABD & PELVIS W/CONTRAST: CPT | Mod: MA

## 2022-07-28 PROCEDURE — 85730 THROMBOPLASTIN TIME PARTIAL: CPT

## 2022-07-28 PROCEDURE — 93005 ELECTROCARDIOGRAM TRACING: CPT

## 2022-07-28 RX ORDER — ATORVASTATIN CALCIUM 80 MG/1
1 TABLET, FILM COATED ORAL
Qty: 0 | Refills: 0 | DISCHARGE

## 2022-07-28 RX ORDER — ZOLPIDEM TARTRATE 10 MG/1
5 TABLET ORAL AT BEDTIME
Refills: 0 | Status: DISCONTINUED | OUTPATIENT
Start: 2022-07-28 | End: 2022-07-28

## 2022-07-28 RX ORDER — LOSARTAN POTASSIUM 100 MG/1
1 TABLET, FILM COATED ORAL
Qty: 0 | Refills: 0 | DISCHARGE

## 2022-07-28 RX ORDER — ATENOLOL 25 MG/1
1 TABLET ORAL
Qty: 0 | Refills: 0 | DISCHARGE

## 2022-07-28 RX ADMIN — PANTOPRAZOLE SODIUM 40 MILLIGRAM(S): 20 TABLET, DELAYED RELEASE ORAL at 06:03

## 2022-07-28 RX ADMIN — SIMETHICONE 80 MILLIGRAM(S): 80 TABLET, CHEWABLE ORAL at 00:06

## 2022-07-28 RX ADMIN — HEPARIN SODIUM 5000 UNIT(S): 5000 INJECTION INTRAVENOUS; SUBCUTANEOUS at 14:48

## 2022-07-28 RX ADMIN — HEPARIN SODIUM 5000 UNIT(S): 5000 INJECTION INTRAVENOUS; SUBCUTANEOUS at 06:03

## 2022-07-28 RX ADMIN — CHOLESTYRAMINE 4 GRAM(S): 4 POWDER, FOR SUSPENSION ORAL at 14:47

## 2022-07-28 RX ADMIN — SIMETHICONE 80 MILLIGRAM(S): 80 TABLET, CHEWABLE ORAL at 06:03

## 2022-07-28 RX ADMIN — SIMETHICONE 80 MILLIGRAM(S): 80 TABLET, CHEWABLE ORAL at 14:47

## 2022-07-28 RX ADMIN — ZOLPIDEM TARTRATE 5 MILLIGRAM(S): 10 TABLET ORAL at 01:21

## 2022-07-28 NOTE — CHART NOTE - NSCHARTNOTEFT_GEN_A_CORE
Brief GI update  ==========  Procedure postponed to tomorrow due to suite availability.     - Please make NPO midnight  - Please get 12AM labs - CMP, CBC, coags    Thank you for involving us in the care of this patient. Please reach out if any further questions.    Eleazar Mark, PGY-6  Gastroenterology/Hepatology Fellow    Available on Microsoft Teams  After 5PM/Weekends, please contact the on-call GI fellow: [phone]
Consult rescinded after speaking with the primary team.    Palliative Medicine Service  The patient's care plan has been delineated.  Thank you for the consult, feel free to reconsult when clinical needs arise        Clayton Bravo MD   of Geriatric and Palliative Medicine  [facility]    Between the hours of 9 am and 5 pm from Monday through Friday, please contact me on Microsoft Teams    After 5pm and on weekends, please see the contact information below:    In the event of newly developing, evolving, or worsening symptoms, please contact the Palliative Medicine team via pager (if the patient is at [facility] #[phone] or if the patient is at [facility] #[phone]) The Geriatric and Palliative Medicine service has coverage 24 hours a day/ 7 days a week to provide medical recommendations regarding symptom management needs via telephone
Discussed goals of care with patient this morning, and she confirmed that she does not want any chemotherapy nor surgical interventions. She understands that her likely malignancy will progress. Later in the day, additional discussions confirmed that she would like to go home with home hospice. Son was at bedside and was a witness to this discussion about hospice. Hospice consult placed by . 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 Palliative care also consulted for help with symptom management.     ***************************************************************  Amairani Lomax, PGY3  Internal Medicine   pager: NS: [phone] [facility]: [phone]  ***************************************************************
This morning, patient reassessed her situation after discussions with oncology. Her pathology showed high grade dysplastic cells and was inconclusive for malignancy; the pathologist recommended a repeat biopsy. CT chest demonstrated no metastatic disease. Patient would like to have a biopsy outpatient confirming her diagnosis and she now has an appointment scheduled for 8/4/22 with Dr. Kendrick. Since based on imaging, if she has confirmed malignancy, the disease could be local and surgery could be curative. Patient now seems amenable to surgery post-discharge. Additionally, radiation therapy was discussed as an option to shrink the mass and extend time to duodenal obstruction and patient is amenable to RT. Patient may not qualify for hospice given a cancer diagnosis is not confirmed, in which case she will go home with home PT. She also has an agency she can call for additional help at home.    ***************************************************************  Amairani Lomax, PGY3  Internal Medicine   pager: NS: [phone] [facility]: [phone]  ***************************************************************
This report was requested by: Davon Faulkner | Reference #: [id number]    Others' Prescriptions  Patient Name: Nisha Brito          YOB: 1932  Address: [address] [place] [id number]: Female  Rx Written	Rx Dispensed	Drug	Quantity	Days Supply	Prescriber Name	Prescriber Ana #	Payment Method  06/16/2022	06/17/2022	zolpidem tartrate 10 mg tablet	30	30	Cash [id number]	[facility]are [facility] #[id number]  05/16/2022	05/16/2022	zolpidem tartrate 10 mg tablet	30	30	Cash [id number]	Medicare [facility] #[id number]  04/18/2022	04/22/2022	alprazolam 0.25 mg tablet	30	30	Cash [id number]	Medicare [facility] #[id number]  03/16/2022	03/18/2022	alprazolam 0.25 mg tablet	30	30	Cash Washington	[id number]	Medicare [facility] #[id number]  02/09/2022	03/01/2022	zolpidem tartrate 10 mg tablet	30	30	Cash [id number]	Medicare [facility] #[id number]

## 2022-07-28 NOTE — DISCHARGE NOTE NURSING/CASE MANAGEMENT/SOCIAL WORK - NSDCFUADDAPPT_GEN_ALL_CORE_FT
You have an appointment with Dr. Kendrick (surgical oncology) on 8/4/22 at 1:30pm to discuss performing a biopsy of your mass.     Please make an appointment with Dr. Washington within 1 week of hospital discharge.    Please make an appointment with an oncologist at [facility] within 1 week of hospital discharge.    Visiting nurse will contact you the day after discharge to schedule appointment.

## 2022-07-28 NOTE — DISCHARGE NOTE NURSING/CASE MANAGEMENT/SOCIAL WORK - NSDCPEFALRISK_GEN_ALL_CORE
For information on Fall & Injury Prevention, visit: [email or web address] OR  [email or web address] OR  https://www.cdc.gov/steadi/patient.html

## 2022-07-28 NOTE — PROGRESS NOTE ADULT - PROBLEM SELECTOR PLAN 1
Pruritis, pale stools, jaundice, dark urine, intermittent RUQ pain  Poor appetite past few days, denies weight loss.   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 ERCP revealed diffuse biliary ductal dilation s/p placement of stent. Ca 19-9 of 56. 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 bili increasing up to 10.4 until ERCP on 7/25; rapidly improving after stent placement, now < 5, if T. 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

## 2022-07-28 NOTE — PROGRESS NOTE ADULT - SUBJECTIVE AND OBJECTIVE BOX
***************************************************************  Brooke Bruce MD (PGY-1)  Internal Medicine  Pager: [phone]  ***************************************************************    PROGRESS NOTE:     Patient is a 89y old  Female who presents with a chief complaint of Itching, orange urine, pale stools, RUQ abdominal pain (28 Jul 2022 07:24)    SUBJECTIVE / OVERNIGHT EVENTS: Patient seen and examined at bedside. No acute overnight events. This morning, the patient reports itching but denies fevers, chills, N/V/D, chest pain, SOB, abdominal pain.    MEDICATIONS  (STANDING):  heparin   Injectable 5000 Unit(s) SubCutaneous every 8 hours  lactated ringers. 500 milliLiter(s) (30 mL/Hr) IV Continuous <Continuous>  melatonin 3 milliGRAM(s) Oral at bedtime  pantoprazole    Tablet 40 milliGRAM(s) Oral before breakfast  senna 2 Tablet(s) Oral at bedtime  simethicone 80 milliGRAM(s) Chew four times a day    MEDICATIONS  (PRN):  aluminum hydroxide/magnesium hydroxide/simethicone Suspension 30 milliLiter(s) Oral every 4 hours PRN Dyspepsia  calamine/zinc oxide Lotion 1 Application(s) Topical four times a day PRN Itching  cholestyramine Powder (Sugar-Free) 4 Gram(s) Oral two times a day PRN itching  zolpidem 5 milliGRAM(s) Oral at bedtime PRN Insomnia      CAPILLARY BLOOD GLUCOSE        I&O's Summary    27 Jul 2022 07:01  -  28 Jul 2022 07:00  --------------------------------------------------------  IN: 480 mL / OUT: 0 mL / NET: 480 mL        PHYSICAL EXAM:  Vital Signs Last 24 Hrs  T(C): 36.7 (28 Jul 2022 04:31), Max: 36.7 (27 Jul 2022 14:41)  T(F): 98.1 (28 Jul 2022 04:31), Max: 98.1 (27 Jul 2022 21:38)  HR: 56 (28 Jul 2022 04:31) (49 - 59)  BP: 148/68 (28 Jul 2022 04:31) (142/69 - 152/75)  BP(mean): --  RR: 18 (28 Jul 2022 04:31) (18 - 18)  SpO2: 96% (28 Jul 2022 04:31) (96% - 97%)    Parameters below as of 28 Jul 2022 04:31  Patient On (Oxygen Delivery Method): room air      Constitutional: NAD  HEENT: non-icteric sclera, no conjunctival pallor appreciated  Respiratory: CTA b/l, no labored breathing  Cardiovascular: RRR, normal S1S2  Gastrointestinal: soft, NTND, no masses palpable  Extremities: Warm, well perfused, no edema appreciated in the b/l lower extremities.   Neurological: AAOx3  Skin: Normal temperature, warm, dry    LABS:                        8.1    6.90  )-----------( 294      ( 27 Jul 2022 11:04 )             25.8     07-27    141  |  106  |  50<H>  ----------------------------<  210<H>  3.8   |  21<L>  |  1.86<H>    Ca    8.7      27 Jul 2022 11:04  Phos  3.0     07-27  Mg     1.9     07-27    TPro  6.2  /  Alb  2.9<L>  /  TBili  4.9<H>  /  DBili  3.4<H>  /  AST  44<H>  /  ALT  90<H>  /  AlkPhos  610<H>  07-27                RADIOLOGY & ADDITIONAL TESTS:  Results Reviewed:   Imaging Personally Reviewed:  Electrocardiogram Personally Reviewed:    COORDINATION OF CARE:  Care Discussed with Consultants/Other Providers [Y/N]:  Prior or Outpatient Records Reviewed [Y/N]:

## 2022-07-28 NOTE — PROGRESS NOTE ADULT - PROVIDER SPECIALTY LIST ADULT
Gastroenterology
Internal Medicine
Nephrology
Cardiology
Nephrology
Surgery
Surgery
Cardiology
Gastroenterology
Heme/Onc
Internal Medicine
Internal Medicine
Nephrology
Surgery
Gastroenterology
Internal Medicine

## 2022-07-28 NOTE — PROGRESS NOTE ADULT - ASSESSMENT
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    #Ampullary Adenoma  Admitted for obstructive jaundice and malignancy workup. Initial CT abd/pelvis w/ IV con showing biliary obstruction due to ampullary mass, confirmed on MRCP and now s/p ERCP/EUS 7/25 with biopsy showing adenoma  - No obvious signs of metastasis on CT abd/pelvis w/ IV con 7/19 or on CT chest non-con 7/26  - CA 19-9 elevated to 56 and CEA wnl 1.4  - T. 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 Recommend repeat biopsy of ampullary mass if pt amenable  - At this time, patient only with diagnosis of ampullary adenoma and is not appropriate for hospice  - Surg onc following; appreciate recs  - Oncology will continue to follow. 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    #Ampullary Adenoma  Admitted for obstructive jaundice and malignancy workup. Initial CT abd/pelvis w/ IV con showing biliary obstruction due to ampullary mass, confirmed on MRCP and now s/p ERCP/EUS 7/25 with biopsy showing adenoma  - No obvious signs of metastasis on CT abd/pelvis w/ IV con 7/19 or on CT chest non-con 7/26  - CA 19-9 elevated to 56 and CEA wnl 1.4  - T. 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 Recommend discussing with GI regarding appointment for repeat biopsy of ampullary mass if pt amenable  - At this time, patient only with diagnosis of ampullary adenoma and is not appropriate for hospice  - Surg onc following; appreciate recs  - Already referred to [facility] but will need definitive diagnosis  - Would still recommend that patient stay for the repeat biopsy as it would likely be performed earlier than if she were to have it done outpatient. If she is to be discharged, please make sure that she has scheduled follow up with GI to have the repeat biopsy.  - Oncology will continue to follow. Please page with questions    Raul Chapman MD  Hematology/Oncology Fellow PGY-4  Pager: [phone]   After 5pm and on weekends please page on-call fellow

## 2022-07-28 NOTE — PROGRESS NOTE ADULT - ASSESSMENT
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 CT chest negative for metastasis. US LE neg for DVT.

## 2022-07-28 NOTE — DISCHARGE NOTE NURSING/CASE MANAGEMENT/SOCIAL WORK - PATIENT PORTAL LINK FT
You can access the FollowMyHealth Patient Portal offered by [facility] by registering at the following website: [email or web address]. By joining [facility]’s FollowMyHealth portal, you will also be able to view your health information using other applications (apps) compatible with our system.

## 2022-07-28 NOTE — PROGRESS NOTE ADULT - PROBLEM SELECTOR PROBLEM 1
Obstructive jaundice

## 2022-07-28 NOTE — PROGRESS NOTE ADULT - ATTENDING COMMENTS
above plans discussed with Dr. Brooke    # obstructive jaundice  # ampullary mass?  # CAMRON on CKD  # Anemia    - biliary dilation with questionable obstructing ampullary mass on CTAP  - MRCP pending for further evaluation  - GI consulted: plan for EUS/ERCP  - CAMRON likely prerenal in setting of poor po intake; contin IVF hydration  - anemia stable at 7-8; FOBT negative - likely chronic anemia in setting of CKD vs. malignancy  - pt is otherwise very functional and wants all the workups to be done to make decisions    Temitope Atkinson MD  Division of Hospital Medicine  Contact via Microsoft Teams  Office: [phone]
Agree with above. Pending cardiology clearance, plan for EUS/ERCP for obstructive jaundice. Discussed with patient that given her dyspnea and pulmonary hypertension, she needs optimization before anesthesia. Make NPO after midnight if patient is ok to proceed with EUS/ERCP from cardiac standpoint. Monitor liver enzymes.
As above    Impression:    #1.  RUQ abd pain  #2.  New onset jaundice with pruritis  #3.  Biliary dilation with sharp cutoff in area of ampulla.  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  #4.  Hypoxia  #5.  Worsening acute on chronic renal insufficiency.    Recommendations:    #1.  Follow CBC/LFTs  #2.  Optimize fluid status/pulmonary status, consider cardiology consultation.  #3.  EUS/ERCP with general anesthesia with fluid status and pulmonary status optimized.
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 Discussed findings on CT with pt and son. CT chest is pending for staging. Path is still pending. Plan for monitoring for Tbili decrease, improvement in CAMRON, then d/c home. She is not sure about proceeding with surgery or chemotherapy. Path returned with high grade dysplasia, no definitive evidence of malignancy. Will set up pt with an apt with [facility] next Tues to continue discussions re treatment options. If pt chooses to continue with DMT, may need another bx to confirm malignancy unless pt is an operative candidate.
CT chest without evidence of metastasis.  Awaiting biopsy to direct care.  Once malignancy is confirmed, patient is a candidate for pancreaticoduodenectomy if acceptable medical risk.
I have seen this patient with the fellow and agree with their assessment and plan. I was physically present for significant portions of the evaluation and management (E/M) service provided.  I agree with the above history, physical, and plan which I have reviewed and edited where appropriate.    Pt. with CAMRON vs CAMRON on CKD in the setting of obstructive jaundice(pigment nephropathy), use of diuretics, IV contrast and decreased PO intake. UA with few RBCs (improving) and spot urine TP/Cr 0.2.  Pt. non oliguric, and Scr has been improving.   Noted to have bilateral densities, ?cysts on bilateral kidneys on CT abdomen. Recommend to check renal US.   No renal contraind for GI procedure today    d/w with Medicine attending and NP     Keshav Stein MD  Pager   Office     Contact me directly via Microsoft Teams     (After 5 pm or on weekends please page the on-call fellow/attending, can check [email or web address] for schedule. Login is eduardo lindsay, schedule under [facility] medicine, psych, derm)
89yof with HTN and HLD presented to outpatient doctor with 1 week of itching, orange urine, pale stools, and intermittent RUQ abdominal pain sent in by PCP for concern for malignancy. Patient had colonoscopy she thinks 4-5y ago, she believes also had endoscopy then, reports everything was normal.  She was found to have cholestatic pattern transaminitis and CT AP concerning for biliary dilatation 2/2 obstruction, admitted for malignancy workup. GI was consulted with plan for EUS/ERCP which was delayed due to development of dyspnea, new hypoxia on 7/22 with  CXR consistent with acute pulmonary edema, s/p lasix IV .   s/p TTE normal LV systolic function, no segmental wall motion abnormality, normal RV size/function, mild-moderate pTHN. hypoxia initially resolved .  Patient was seen by Cardiologist who recommended 1 units of PRBC which was administered with reasonable response.     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 F/up PATH results.   Patient was seen by medical and surgical ONC,  CT of chest without contrast was done with no reported evidence of metastasis.   Pt was consulted due to general weakness and awaiting on Rec .  Patient requests for palliative care evaluation.          [facility]   Hospitalist   [phone].
above plans discussed with Dr. Willis    # obstructive jaundice  # ampullary mass?  # CAMRON on CKD  # Anemia    - biliary dilation with questionable obstructing ampullary mass on CTAP  - MRCP with highly suspicious malignancy  - GI consulted: plan for EUS/ERCP this afternoon  - based on the findings, will consult oncology  - CAMRON worsening this morning to 3; likely combination of prerenal in setting of poor po intake as well as CANDI? given IV contrast ~48hrs ago  - continue IVF, send urine lytes and monitor BMP closely  - hold off on staging imaging given CAMRON  - anemia stable at 7-8; FOBT negative - likely chronic anemia in setting of CKD vs. malignancy  - pt is otherwise very functional and wants all the workups to be done to make decisions    Temitope Atkinson MD  Division of Hospital Medicine  Contact via Microsoft Teams  Office: [phone]
89yof with HTN and HLD presented to outpatient doctor with 1 week of itching, orange urine, pale stools, and intermittent RUQ abdominal pain sent in by PCP for concern for malignancy. Patient had colonoscopy she thinks 4-5y ago, she believes also had endoscopy then, reports everything was normal.  She was found to have cholestatic pattern transaminitis and CT AP concerning for biliary dilatation 2/2 obstruction, admitted for malignancy workup. GI was consulted with plan for EUS/ERCP which was delayed due to development of dyspnea, new hypoxia on 7/22 with  CXR consistent with acute pulmonary edema, s/p lasix IV .   s/p TTE normal LV systolic function, no segmental wall motion abnormality, normal RV size/function, mild-moderate pTHN. hypoxia initially resolved .  Patient was seen by Cardiologist who recommended 1 units of PRBC which was administered with reasonable response.     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 F/up PATH results.   Patient was seen by medical and surgical ONC,  CT of chest without contrast was done with no reported evidence of metastasis.    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  Patient prefers outpatient apt for the repeat biopsy and DOES NOT want to remain in the hospital.  Goal is to discuss with SUrg onc about possible biopsy as oupt as pt wants to go home .        Bev Celestin   Hospitalist   [phone].
89yof with HTN and HLD presented to outpatient doctor with 1 week of itching, orange urine, pale stools, and intermittent RUQ abdominal pain sent in by PCP for concern for malignancy. Patient had colonoscopy she thinks 4-5y ago, she believes also had endoscopy then, reports everything was normal.  She was found to have cholestatic pattern transaminitis and CT AP concerning for biliary dilatation 2/2 obstruction, admitted for malignancy workup. GI was consulted with plan for EUS/ERCP which was delayed due to development of dyspnea, new hypoxia on 7/22 with  CXR consistent with acute pulmonary edema, s/p lasix IV .   s/p TTE normal LV systolic function, no segmental wall motion abnormality, normal RV size/function, mild-moderate pTHN. hypoxia initially resolved .  Patient was seen by Cardiologist who recommended 1 units of PRBC which was administered with reasonable response.     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  GI team is called and they will try their best to have the procedure and is awaiting on the COVID testing report.    Patient was seen with the team with the nurse manager present and she understands.    Patient is noted to have bradycardia on Atenolol.  will monitor and if remains bradycardia , will contact her PCP and or cardiologist and will lower the dose of Atenolol , she is off the HCTZ and the Losartan due to xochitl.  F/up renal US .        Bev Celestin   Hospitalist   [phone]
89yof with HTN and HLD presented to outpatient doctor with 1 week of itching, orange urine, pale stools, and intermittent RUQ abdominal pain sent in by PCP for concern for malignancy. Patient had colonoscopy she thinks 4-5y ago, she believes also had endoscopy then, reports everything was normal.  She was found to have cholestatic pattern transaminitis and CT AP concerning for biliary dilatation 2/2 obstruction, admitted for malignancy workup. GI was consulted with plan for EUS/ERCP which was delayed due to development of dyspnea, new hypoxia on 7/22 with  CXR consistent with acute pulmonary edema, s/p lasix IV .   s/p TTE normal LV systolic function, no segmental wall motion abnormality, normal RV size/function, mild-moderate pTHN. hypoxia initially resolved .  Patient was seen by Cardiologist who recommended 1 units of PRBC which was administered with reasonable response.     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  Patient was seen by medical and surgical ONC  and awaiting on CT of chest for staging and possible surgical intervention.          [facility] Hospitalist   [phone].
above plans discussed with Dr. Brooke    # obstructive jaundice  # ampullary mass?  # CAMRON on CKD  # Anemia  # acute pulmonary edema    - worsening dyspnea with new hypoxia overnight, CXR consistent with acute pulmonary edema  - s/p IV lasix with improvement in respiratory status, pending TTE to assess cardiac function  - hypoxia likely in setting of pulmonary edema rather than PE at this time; Wells score only 1, and her hypoxia improved with IV lasix  - biliary dilation with questionable obstructing ampullary mass on CTAP  - MRCP with highly suspicious malignancy  - GI consulted: plan for EUS/ERCP   - based on the findings, will consult oncology  - CAMRON worsening this morning again, wonder if cardiorenal. Assess cardiac function with TTE  - hold off on staging imaging given CAMRON  - anemia stable at 7-8; FOBT negative - likely chronic anemia in setting of CKD vs. malignancy  - pt is otherwise very functional and wants all the workups to be done to make decisions    Temitope Atkinson MD  Division of Hospital Medicine  Contact via Microsoft Teams  Office: [phone]
# obstructive jaundice  # ampullary mass?  # CAMRON on CKD  # Anemia  # acute pulmonary edema    - On 7/22 course c/b dyspnea with new hypoxia overnight, CXR consistent with acute pulmonary edema, s/p lasix IV x1, now hypoxia resolved, on RA SaO2> 95%, s/p TTE normal LV systolic function, no segmental wall motion abnormality, normal RV size/function, mild-moderate pTHN.   - biliary dilation with questionable obstructing ampullary mass on CTAP  - MRCP with highly suspicious malignancy  - GI consulted: plan for EUS/ERCP   - based on the findings, will consult oncology  - CAMRON continue worsening, will consult renal , s/p TTE normal LV systolic/RV size function, low suspicion for cardiorenal   - hold off on staging imaging given CAMRON  - anemia stable at 7-8; FOBT negative - likely chronic anemia in setting of CKD vs. malignancy  - pt is otherwise very functional and wants all the workups to be done to make decisions
# obstructive jaundice  # ampullary mass?  # CAMRON on CKD  # Anemia  # acute pulmonary edema    - On 7/22 course c/b dyspnea with new hypoxia overnight, CXR consistent with acute pulmonary edema, s/p lasix IV x1,, s/p TTE normal LV systolic function, no segmental wall motion abnormality, normal RV size/function, mild-moderate pTHN. hypoxia initially resolved, however overnight patient was placed on 2L O2 despite no documentation of hypoxia----> will wean off O2 today   - biliary dilation with questionable obstructing ampullary mass on CTAP  - MRCP with highly suspicious malignancy  - GI consulted: plan for EUS/ERCP   - s/p cardiology evaL: recommend 1 unit pRBC followed by lasix 40 IV x1, then optimized for EUS/ERCP   - based on the findings, will consult oncology  - CAMRON continue worsening, s/p renal consult, rec: check renal US. Hold diuretics. Monitor BMP. Strict I/Os. Avoid nephrotoxins.   - hold off on staging imaging given CAMRON  - anemia stable at 7-8; FOBT negative - likely chronic anemia in setting of CKD vs. malignancy  - pt is otherwise very functional and wants all the workups to be done to make decisions .    D/w Dr. Brooke

## 2022-07-28 NOTE — PROGRESS NOTE ADULT - PROBLEM SELECTOR PLAN 5
Home med: lipitor 20 qd  - hold lipitor given acute transaminitis

## 2022-07-28 NOTE — PROGRESS NOTE ADULT - ATTENDING SUPERVISION STATEMENT
Resident
Resident
Fellow
Resident

## 2022-07-28 NOTE — PROGRESS NOTE ADULT - SUBJECTIVE AND OBJECTIVE BOX
CARDIOLOGY     PROGRESS  NOTE   ________________________________________________    CHIEF COMPLAINT:Patient is a 89y old  Female who presents with a chief complaint of Itching, orange urine, pale stools, RUQ abdominal pain (28 Jul 2022 07:45)  no complain.  	  REVIEW OF SYSTEMS:  CONSTITUTIONAL: No fever, weight loss, or fatigue  EYES: No eye pain, visual disturbances, or discharge  ENT:  No difficulty hearing, tinnitus, vertigo; No sinus or throat pain  NECK: No pain or stiffness  RESPIRATORY: No cough, wheezing, chills or hemoptysis; No Shortness of Breath  CARDIOVASCULAR: No chest pain, palpitations, passing out, dizziness, or leg swelling  GASTROINTESTINAL: No abdominal or epigastric pain. No nausea, vomiting, or hematemesis; No diarrhea or constipation. No melena or hematochezia.  GENITOURINARY: No dysuria, frequency, hematuria, or incontinence  NEUROLOGICAL: No headaches, memory loss, loss of strength, numbness, or tremors  SKIN: No itching, burning, rashes, or lesions   LYMPH Nodes: No enlarged glands  ENDOCRINE: No heat or cold intolerance; No hair loss  MUSCULOSKELETAL: No joint pain or swelling; No muscle, back, or extremity pain  PSYCHIATRIC: No depression, anxiety, mood swings, or difficulty sleeping  HEME/LYMPH: No easy bruising, or bleeding gums  ALLERGY AND IMMUNOLOGIC: No hives or eczema	    [ ] All others negative	  [ ] Unable to obtain    PHYSICAL EXAM:  T(C): 36.7 (07-28-22 @ 04:31), Max: 36.7 (07-27-22 @ 14:41)  HR: 56 (07-28-22 @ 04:31) (49 - 59)  BP: 148/68 (07-28-22 @ 04:31) (142/69 - 152/75)  RR: 18 (07-28-22 @ 04:31) (18 - 18)  SpO2: 96% (07-28-22 @ 04:31) (96% - 97%)  Wt(kg): --  I&O's Summary    27 Jul 2022 07:01  -  28 Jul 2022 07:00  --------------------------------------------------------  IN: 480 mL / OUT: 0 mL / NET: 480 mL        Appearance: Normal	  HEENT:   Normal oral mucosa, PERRL, EOMI	  Lymphatic: No lymphadenopathy  Cardiovascular: Normal S1 S2, No JVD, + murmurs, No edema  Respiratory: Lungs clear to auscultation	  Psychiatry: A & O x 3, Mood & affect appropriate  Gastrointestinal:  Soft, Non-tender, + BS	  Skin: No rashes, No ecchymoses, No cyanosis	  Neurologic: Non-focal  Extremities: Normal range of motion, No clubbing, cyanosis or edema  Vascular: Peripheral pulses palpable 2+ bilaterally    MEDICATIONS  (STANDING):  heparin   Injectable 5000 Unit(s) SubCutaneous every 8 hours  lactated ringers. 500 milliLiter(s) (30 mL/Hr) IV Continuous <Continuous>  melatonin 3 milliGRAM(s) Oral at bedtime  pantoprazole    Tablet 40 milliGRAM(s) Oral before breakfast  senna 2 Tablet(s) Oral at bedtime  simethicone 80 milliGRAM(s) Chew four times a day      TELEMETRY: 	    ECG:  	  RADIOLOGY:  OTHER: 	  	  LABS:	 	    CARDIAC MARKERS:                                8.2    6.12  )-----------( 280      ( 28 Jul 2022 07:21 )             25.8     07-28    144  |  110<H>  |  42<H>  ----------------------------<  139<H>  3.5   |  22  |  1.66<H>    Ca    8.8      28 Jul 2022 07:21  Phos  3.1     07-28  Mg     1.9     07-28    TPro  6.3  /  Alb  3.1<L>  /  TBili  4.1<H>  /  DBili  x   /  AST  34  /  ALT  75<H>  /  AlkPhos  572<H>  07-28    proBNP:   Lipid Profile:   HgA1c:   TSH:         Assessment and plan  ---------------------------  89yof with HTN and HLD presented to outpatient doctor with 1 week of itching, orange urine, pale stools, and intermittent RUQ abdominal pain sent in by PCP for concern for malignancy. Patient reports the abdominal pain was more like a soreness, intermittent, 3/10 in severity. 1 episode of NBNB vomiting, patient attributed to reflux. Patient did report dark stool about 3 weeks prior to admission, denies PO iron use. Does report to be anemic, but denies dizziness, weakness, lightheadedness. Denies any history of kidney disease. Does report decreased appetite over past 4 days, decreased hydration as well. Patient had colonoscopy she thinks 4-5y ago, she believes also had endoscopy then, reports everything was normal. Did receive appropriate screening mammograms. Of note she did have muscles in a restaurant 10d ago, which is 3d prior to symptom onset. Denies fevers, but endorses chills. Denies hematemesis, hematuria, dysuria, urinary frequency.   pt with hx of htn , ckd stage 3, with RUQ abdominal pain.  pt denies of any cardiac hx , with LBBB ?old, echo with normal EF and no WMA with mod pulmonary htn  transfuse one unit of prbc, Lasix one dose post transfusion  fu lkft post ERCP  pulmonary HTN ?etiology, check le venous doppler  ercp noted  dc atenolol add norvasc 5 mg daily if increase bp  dvt proiphylaxis

## 2022-07-28 NOTE — PROGRESS NOTE ADULT - PROBLEM SELECTOR PROBLEM 3
Normocytic anemia

## 2022-07-28 NOTE — PROGRESS NOTE ADULT - SUBJECTIVE AND OBJECTIVE BOX
Oncology Follow-up    INTERVAL HPI/OVERNIGHT EVENTS:  Patient S&E at bedside. No o/n events, patient resting comfortably. No complaints at this time. Patient denies fever, chills, dizziness, weakness, CP, palpitations, SOB, cough, N/V/D/C, dysuria, changes in bowel movements, LE edema.    VITAL SIGNS:  T(F): 98.1 (07-28-22 @ 04:31)  HR: 56 (07-28-22 @ 04:31)  BP: 148/68 (07-28-22 @ 04:31)  RR: 18 (07-28-22 @ 04:31)  SpO2: 96% (07-28-22 @ 04:31)  Wt(kg): --    PHYSICAL EXAM:    Constitutional: AAOx3, NAD,   Eyes: PERRL, EOMI, sclera non-icteric  Neck: supple, no masses, no JVD  Respiratory: CTA b/l, good air entry b/l, no wheezing, rhonchi, rales, with normal respiratory effort and no intercostal retractions  Cardiovascular: RRR, normal S1S2, no M/R/G  Gastrointestinal: soft, NTND, no masses palpable, BS normal in all four quadrants, no HSM  Extremities:  no c/c/e  Neurological: Grossly intact  Skin: Normal temperature    MEDICATIONS  (STANDING):  heparin   Injectable 5000 Unit(s) SubCutaneous every 8 hours  lactated ringers. 500 milliLiter(s) (30 mL/Hr) IV Continuous <Continuous>  melatonin 3 milliGRAM(s) Oral at bedtime  pantoprazole    Tablet 40 milliGRAM(s) Oral before breakfast  senna 2 Tablet(s) Oral at bedtime  simethicone 80 milliGRAM(s) Chew four times a day    MEDICATIONS  (PRN):  aluminum hydroxide/magnesium hydroxide/simethicone Suspension 30 milliLiter(s) Oral every 4 hours PRN Dyspepsia  calamine/zinc oxide Lotion 1 Application(s) Topical four times a day PRN Itching  cholestyramine Powder (Sugar-Free) 4 Gram(s) Oral two times a day PRN itching  zolpidem 5 milliGRAM(s) Oral at bedtime PRN Insomnia      penicillin (Unknown)  tetracaine (Unknown)      LABS:                        8.1    6.90  )-----------( 294      ( 27 Jul 2022 11:04 )             25.8     07-27    141  |  106  |  50<H>  ----------------------------<  210<H>  3.8   |  21<L>  |  1.86<H>    Ca    8.7      27 Jul 2022 11:04  Phos  3.0     07-27  Mg     1.9     07-27    TPro  6.2  /  Alb  2.9<L>  /  TBili  4.9<H>  /  DBili  3.4<H>  /  AST  44<H>  /  ALT  90<H>  /  AlkPhos  610<H>  07-27           RADIOLOGY & ADDITIONAL TESTS:  Studies reviewed.   Oncology Follow-up    INTERVAL HPI/OVERNIGHT EVENTS:  Patient S&E at bedside. Updated patient regarding biopsy result showing benign adenoma but the suspicion is still extremely high that the mass is malignant and the original biopsy may have missed adenocarcinoma. 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 Patient expressed that she is willing to undergo a second scope to obtain deeper/multiple biopsies. Introduced RT as an option if she does not want a Whipple. Most importantly, before she can get any treatment options or hospice, she needs confirmation of malignancy. Jaundice is improving as the bilirubin steadily downtrends. Patient insists that she will be going home today and will have the second biopsies done outpatient.    VITAL SIGNS:  T(F): 98.1 (07-28-22 @ 04:31)  HR: 56 (07-28-22 @ 04:31)  BP: 148/68 (07-28-22 @ 04:31)  RR: 18 (07-28-22 @ 04:31)  SpO2: 96% (07-28-22 @ 04:31)  Wt(kg): --    PHYSICAL EXAM:    Constitutional: AAOx3, NAD,   Eyes: PERRL, EOMI, sclera non-icteric  Neck: supple, no masses, no JVD  Respiratory: CTA b/l, good air entry b/l, no wheezing, rhonchi, rales, with normal respiratory effort and no intercostal retractions  Cardiovascular: RRR, normal S1S2, no M/R/G  Gastrointestinal: soft, NTND, no masses palpable, BS normal in all four quadrants, no HSM  Extremities:  no c/c/e  Neurological: Grossly intact  Skin: Normal temperature    MEDICATIONS  (STANDING):  heparin   Injectable 5000 Unit(s) SubCutaneous every 8 hours  lactated ringers. 500 milliLiter(s) (30 mL/Hr) IV Continuous <Continuous>  melatonin 3 milliGRAM(s) Oral at bedtime  pantoprazole    Tablet 40 milliGRAM(s) Oral before breakfast  senna 2 Tablet(s) Oral at bedtime  simethicone 80 milliGRAM(s) Chew four times a day    MEDICATIONS  (PRN):  aluminum hydroxide/magnesium hydroxide/simethicone Suspension 30 milliLiter(s) Oral every 4 hours PRN Dyspepsia  calamine/zinc oxide Lotion 1 Application(s) Topical four times a day PRN Itching  cholestyramine Powder (Sugar-Free) 4 Gram(s) Oral two times a day PRN itching  zolpidem 5 milliGRAM(s) Oral at bedtime PRN Insomnia      penicillin (Unknown)  tetracaine (Unknown)      LABS:                        8.1    6.90  )-----------( 294      ( 27 Jul 2022 11:04 )             25.8     07-27    141  |  106  |  50<H>  ----------------------------<  210<H>  3.8   |  21<L>  |  1.86<H>    Ca    8.7      27 Jul 2022 11:04  Phos  3.0     07-27  Mg     1.9     07-27    TPro  6.2  /  Alb  2.9<L>  /  TBili  4.9<H>  /  DBili  3.4<H>  /  AST  44<H>  /  ALT  90<H>  /  AlkPhos  610<H>  07-27           RADIOLOGY & ADDITIONAL TESTS:  Studies reviewed.

## 2022-07-29 ENCOUNTER — NON-APPOINTMENT (OUTPATIENT)
Age: 87
End: 2022-07-29

## 2022-08-02 ENCOUNTER — OUTPATIENT (OUTPATIENT)
Dept: OUTPATIENT SERVICES | Facility: HOSPITAL | Age: 87
LOS: 1 days | Discharge: HOME | End: 2022-08-02

## 2022-08-02 ENCOUNTER — RESULT REVIEW (OUTPATIENT)
Age: 87
End: 2022-08-02

## 2022-08-02 ENCOUNTER — NON-APPOINTMENT (OUTPATIENT)
Age: 87
End: 2022-08-02

## 2022-08-02 ENCOUNTER — APPOINTMENT (OUTPATIENT)
Dept: MULTI SPECIALTY CLINIC | Facility: CLINIC | Age: 87
End: 2022-08-02

## 2022-08-02 ENCOUNTER — APPOINTMENT (OUTPATIENT)
Dept: RADIATION ONCOLOGY | Facility: CLINIC | Age: 87
End: 2022-08-02

## 2022-08-02 ENCOUNTER — APPOINTMENT (OUTPATIENT)
Dept: SURGICAL ONCOLOGY | Facility: CLINIC | Age: 87
End: 2022-08-02

## 2022-08-02 VITALS
WEIGHT: 153.88 LBS | RESPIRATION RATE: 16 BRPM | BODY MASS INDEX: 28.68 KG/M2 | OXYGEN SATURATION: 99 % | HEART RATE: 96 BPM | DIASTOLIC BLOOD PRESSURE: 77 MMHG | SYSTOLIC BLOOD PRESSURE: 133 MMHG | HEIGHT: 61.57 IN | TEMPERATURE: 97.1 F

## 2022-08-02 DIAGNOSIS — M26.609 UNSPECIFIED TEMPOROMANDIBULAR JOINT DISORDER, UNSPECIFIED SIDE: ICD-10-CM

## 2022-08-02 DIAGNOSIS — Z98.891 HISTORY OF UTERINE SCAR FROM PREVIOUS SURGERY: Chronic | ICD-10-CM

## 2022-08-02 LAB
BASOPHILS # BLD AUTO: 0.04 K/UL — SIGNIFICANT CHANGE UP (ref 0–0.2)
BASOPHILS NFR BLD AUTO: 0.5 % — SIGNIFICANT CHANGE UP (ref 0–2)
EOSINOPHIL # BLD AUTO: 0.02 K/UL — SIGNIFICANT CHANGE UP (ref 0–0.5)
EOSINOPHIL NFR BLD AUTO: 0.2 % — SIGNIFICANT CHANGE UP (ref 0–6)
HCT VFR BLD CALC: 33.3 % — LOW (ref 34.5–45)
HGB BLD-MCNC: 10.2 G/DL — LOW (ref 11.5–15.5)
IMM GRANULOCYTES NFR BLD AUTO: 0.6 % — SIGNIFICANT CHANGE UP (ref 0–1.5)
LYMPHOCYTES # BLD AUTO: 0.75 K/UL — LOW (ref 1–3.3)
LYMPHOCYTES # BLD AUTO: 9.4 % — LOW (ref 13–44)
MCHC RBC-ENTMCNC: 29.1 PG — SIGNIFICANT CHANGE UP (ref 27–34)
MCHC RBC-ENTMCNC: 30.6 G/DL — LOW (ref 32–36)
MCV RBC AUTO: 95.1 FL — SIGNIFICANT CHANGE UP (ref 80–100)
MONOCYTES # BLD AUTO: 0.8 K/UL — SIGNIFICANT CHANGE UP (ref 0–0.9)
MONOCYTES NFR BLD AUTO: 10 % — SIGNIFICANT CHANGE UP (ref 2–14)
NEUTROPHILS # BLD AUTO: 6.35 K/UL — SIGNIFICANT CHANGE UP (ref 1.8–7.4)
NEUTROPHILS NFR BLD AUTO: 79.3 % — HIGH (ref 43–77)
NRBC # BLD: 0 /100 WBCS — SIGNIFICANT CHANGE UP (ref 0–0)
PLATELET # BLD AUTO: 255 K/UL — SIGNIFICANT CHANGE UP (ref 150–400)
RBC # BLD: 3.5 M/UL — LOW (ref 3.8–5.2)
RBC # FLD: 17.2 % — HIGH (ref 10.3–14.5)
WBC # BLD: 8.01 K/UL — SIGNIFICANT CHANGE UP (ref 3.8–10.5)
WBC # FLD AUTO: 8.01 K/UL — SIGNIFICANT CHANGE UP (ref 3.8–10.5)

## 2022-08-02 PROCEDURE — 99215 OFFICE O/P EST HI 40 MIN: CPT

## 2022-08-02 PROCEDURE — 99203 OFFICE O/P NEW LOW 30 MIN: CPT | Mod: 25,GC

## 2022-08-02 RX ORDER — AZELASTINE HYDROCHLORIDE 137 UG/1
0.1 SPRAY, METERED NASAL TWICE DAILY
Qty: 1 | Refills: 2 | Status: ACTIVE | COMMUNITY
Start: 2022-06-23

## 2022-08-02 RX ORDER — ATENOLOL 25 MG/1
25 TABLET ORAL
Qty: 90 | Refills: 3 | Status: DISCONTINUED | COMMUNITY
Start: 2020-07-06 | End: 2022-08-02

## 2022-08-02 RX ORDER — BENZOCAINE/BENZALKONIUM/ALOE/E 5 %-0.13 %
10 CREAM (GRAM) TOPICAL
Refills: 0 | Status: DISCONTINUED | COMMUNITY
End: 2022-08-02

## 2022-08-02 RX ORDER — ASPIRIN 81 MG
81 TABLET, DELAYED RELEASE (ENTERIC COATED) ORAL
Refills: 0 | Status: DISCONTINUED | COMMUNITY
End: 2022-08-02

## 2022-08-02 RX ORDER — CHLORHEXIDINE GLUCONATE 4 %
5 LIQUID (ML) TOPICAL
Refills: 0 | Status: ACTIVE | COMMUNITY

## 2022-08-02 RX ORDER — BACILLUS COAGULANS/INULIN 1B-250 MG
CAPSULE ORAL
Refills: 0 | Status: DISCONTINUED | COMMUNITY
End: 2022-08-02

## 2022-08-02 RX ORDER — ALPRAZOLAM 0.25 MG/1
0.25 TABLET ORAL
Qty: 30 | Refills: 0 | Status: DISCONTINUED | COMMUNITY
Start: 2020-11-30 | End: 2022-08-02

## 2022-08-02 RX ORDER — SIMETHICONE 80 MG/1
80 TABLET, CHEWABLE ORAL
Qty: 56 | Refills: 0 | Status: ACTIVE | COMMUNITY

## 2022-08-03 ENCOUNTER — NON-APPOINTMENT (OUTPATIENT)
Age: 87
End: 2022-08-03

## 2022-08-03 NOTE — HISTORY OF PRESENT ILLNESS
[Abdominal Pain] : abdominal pain [Jaundice] : jaundice [Pruritus] : pruritus [ERCP] : ERCP [EUS] : EUS [Biopsy] : biopsy performed [Before Surgery] : before surgery [Nutrition] : Nutrition [Social Work] : Social Work [Capable of only limited self care, confined to bed or chair more than 50% of waking hours] : Status 3 - Capable of only limited self care, confined to bed or chair more than 50% of waking hours [de-identified] : This is a non-billable note*\par \par \par Nisha Lambert  is a 88 y/o female who presents for evaluation in our Pancreas Multidisciplinary Clinic.\par PMH includes;  HTN, HLD, GERD, anxiety, 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 Patient presented to her PCP(Dr. Shaikh) with generalized weakness x 1-2 months and abnormal LFTs. Patient sent to the ED for further workup on  @ [facility]. Patient admitted w/ RUQ abd pain, jaundice (Tbili: 3.6, peaked to 10.4 on ). c/o N/V x3 days, dark urine and pale colored stools \par \par  \par CT A/P on  showed a Diffuse intrahepatic and extra hepatic biliary ductal dilatation with common bile duct measuring 1.3 cm with abrupt narrowing near the ampulla. Biliary obstruction possibly secondary to ampullary mass, biliary stricture or small stone. No focal hypoenhancing pancreatic lesion or Dilatation of the main pancreatic duct.\par \par MRCP on  showed a Biliary duct dilation with abrupt cut off near the ampulla, where there is an associated 2.4 cm periampullary lesion. Mild prominence of the pancreatic duct to 4 mm in the head. 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 Distended gallbladder with sludge. Mild nonspecific edematous gallbladder Wall thickening. 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.\par pathology -showed high grade dysplastic cells and was inconclusive for malignancy.\par \par  \par ETOH: none \par Smoking hx: none \par Family Ca hx: Son w/ liver Ca \par ECO\par \par Labs:\par  AST: 215, ALT: 242, ALK: 696, Tbili 3.9\par  Ca 19-9: 56 CEA:\par  AST: 34    ALT: 75 ALK: 572 Tbili: 4.1(@discharge) \par \par  [TextBox_4] : 7/19/22 [TextBox_6] : abd pain and jaundice  [TextBox_39] : [id number] [TextBox_41] : showed high grade dysplastic cells and was inconclusive for malignancy\par  \par showed high grade dysplastic cells and was inconclusive for malignancy\par  \par high grade dysplastic cells   [TextBox_43] : 7/25/22 [] : This is not a second opinion [Not seen outside] : not seen outside [Other: ____] : [unfilled] [TextBox_5] : 8/2/22 [TextBox_38] : 56 [FreeTextEntry4] : 4.1 [TextBox_40] : 7/19/22 [TextBox_74] : Dx: periampullary lesion. \par Path:  noted high grade dysplasia \par  [FreeTextEntry6] : Plan:\par 1.	?Palliative RT or local radiation \par 2.	Trend bili\par 3.	Nutrition consult ,  optimize PS\par Meet with patient / son to better assess in person and discuss their GOC \par

## 2022-08-04 ENCOUNTER — APPOINTMENT (OUTPATIENT)
Dept: NEPHROLOGY | Facility: CLINIC | Age: 87
End: 2022-08-04

## 2022-08-04 ENCOUNTER — NON-APPOINTMENT (OUTPATIENT)
Age: 87
End: 2022-08-04

## 2022-08-05 ENCOUNTER — RX CHANGE (OUTPATIENT)
Age: 87
End: 2022-08-05

## 2022-08-05 ENCOUNTER — OUTPATIENT (OUTPATIENT)
Dept: OUTPATIENT SERVICES | Facility: HOSPITAL | Age: 87
LOS: 1 days | Discharge: HOME | End: 2022-08-05

## 2022-08-05 DIAGNOSIS — Z01.812 ENCOUNTER FOR PREPROCEDURAL LABORATORY EXAMINATION: ICD-10-CM

## 2022-08-05 DIAGNOSIS — Z98.891 HISTORY OF UTERINE SCAR FROM PREVIOUS SURGERY: Chronic | ICD-10-CM

## 2022-08-05 LAB
ALBUMIN SERPL ELPH-MCNC: 3.5 G/DL
ALP BLD-CCNC: 476 U/L
ALT SERPL-CCNC: 303 U/L
ANION GAP SERPL CALC-SCNC: 13 MMOL/L
AST SERPL-CCNC: 208 U/L
BILIRUB SERPL-MCNC: 3.1 MG/DL
BUN SERPL-MCNC: 14 MG/DL
CALCIUM SERPL-MCNC: 9.3 MG/DL
CANCER AG19-9 SERPL-ACNC: 47 U/ML
CEA SERPL-MCNC: 1.3 NG/ML
CHLORIDE SERPL-SCNC: 110 MMOL/L
CO2 SERPL-SCNC: 18 MMOL/L
CREAT SERPL-MCNC: 1.32 MG/DL
EGFR: 39 ML/MIN/1.73M2
GLUCOSE SERPL-MCNC: 116 MG/DL
POTASSIUM SERPL-SCNC: 4.3 MMOL/L
PROT SERPL-MCNC: 6.7 G/DL
SODIUM SERPL-SCNC: 141 MMOL/L

## 2022-08-05 RX ORDER — TOLTERODINE TARTRATE 2 MG/1
2 CAPSULE, EXTENDED RELEASE ORAL
Qty: 60 | Refills: 11 | Status: DISCONTINUED | COMMUNITY
Start: 2022-06-22 | End: 2022-08-05

## 2022-08-05 NOTE — PHYSICAL EXAM
[Capable of only limited self care, confined to bed or chair more than 50% of waking hours] : Status 3- Capable of only limited self care, confined to bed or chair more than 50% of waking hours [Normal] : affect appropriate [de-identified] : AOX3, NAD, Patient needs walker for ambulation [de-identified] : 1+edema [de-identified] : +excoriation to arms, chest and back

## 2022-08-05 NOTE — REASON FOR VISIT
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

## 2022-08-05 NOTE — HISTORY OF PRESENT ILLNESS
[Disease: _____________________] : Disease: [unfilled] [de-identified] : 89 yo female with PMHX of Hypertension, Hyperlipidemia comes for evaluation of ampullary mass. Patient history dates back to early 2022. Patient c/o orange urine, pale stools and itchy skin and intermittent RUQ abdominal pain. Patient was sent by PCP who was concern for malignancy. Patient was admitted to [facility] of 2022 for evaluation. She denies any  fevers, chills, or night sweats. She had decrease appetite, but no weight loss. While in the hospital a CT-Scan of Abdomen/Pelvis was done that revealed a mass in ampullary.  A MRI/MRCP of abdomen was done.\par   \par 2022, CT-Scan of Abdomen/Pelvis:\par Diffuse intrahepatic and extra hepatic biliary ductal dilatation with common bile duct measuring 1.3 cm with abrupt narrowing near the ampulla. Biliary obstruction possibly secondary to ampullary mass, biliary stricture or small stone with  no focal hypoenhancing pancreatic lesion or dilatation of the main pancreatic duct. Further evaluation can be obtained with MRCP with IV contrast.\par  \par 2022, Noncontrast abdominal MRI/MRCP:\par Biliary duct dilation with abrupt cut off near the ampulla, where there is an associated 2.4 cm periampullary lesion. Mild prominence of the pancreatic duct to 4 mm in the head. 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 Further evaluation with upper endoscopy/ERCP is recommended. Distended gallbladder with sludge. Mild nonspecific edematous gallbladder wall thickening. Right adnexal cyst measuring 1.7 cm.\par \par 2022, Biopsy of Duodenal was performed: Pathology report revealed: Duodenum, biopsy-Duodenal adenoma with high-grade dysplasia, fragments, see note. Note: Given the worrisome clinical impression of a "mass" the biopsy may not be entirely representative and possibility of an unsampled adenocarcinoma cannot be excluded with certainty. Correlation with endoscopic/radiographic impression is imperative. It there is ongoing concern, a repeat deeper biopsy may be helpful of clinically indicated.\par  \par 2022, CT-Chest:\par Subcentimeter left upper lobe groundglass nodule, likely not metastasis. Indeterminate sclerosis of the anterior aspect of the left fifth rib, possibly posttraumatic.\par \par Patient is a . She lives with one son. She had 3 children.  One son is  at age 48 years old post choking on food; 2nd adult son has Fragile X syndrome who is  mentally disable and Oldest son has Liver Cirrhosis due to HBV. Patient has never smoked and she does not drink alcohol. 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 \par \par \par

## 2022-08-05 NOTE — ASSESSMENT
[FreeTextEntry1] : A discussion took place with the patient and her son regarding further management.  We discussed the results of the imaging including CAT scans MRIs MRCP and biopsy which suggest a localized periampullary mass suggestive of neoplasm however the biopsy only reveals high-grade dysplasia and is nondiagnostic for malignancy.  As result we recommended the patient undergo repeat biopsy which will be scheduled.  Since the lesion appears localized we spoke about options for treatment.  Initially the patient was not thinking about undergoing surgery but now may be open to that option as that would be the most definitive therapeutic modality at this time.  We did suggest doing a PET/CT to confirm localized disease.  If she decides against surgery and the lesion turns out to be malignant and other options would be radiation therapy possibly with sensitizing chemotherapy including capecitabine depending on the pathology results.  Once we have more information further recommendations will be made. [Curative] : Goals of care discussed with patient: Curative [Palliative Care Plan] : not applicable at this time

## 2022-08-05 NOTE — ADDENDUM
[FreeTextEntry1] : I was present with the Resident during the key/critical portions of the visit. I discussed the case with the Resident and agree with the findings and plan as documented in the Resident 's note, unless noted below.  In summary, Ms. 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  I reviewed the records, images, and repeated the key components of the physical exam.  While she has had some decompensation in her performance status her baseline was more of an ECOG 1/2.  She has a lot of symptoms due to jaundice with extreme itching.  Bilirubin is essentially stable and revision may be needed.  If her PFS improves she may be a surgical candidate.  If not, we can consider chemotherapy and/or focused RT if surgery is not an option.  We discussed the potential acute and chronic side effects of  to the ampullary mass.  The patient gave informed consent to proceed.  We will proceed with simulation.  We did prescribe some meds for itching. ANDREW\par

## 2022-08-05 NOTE — CONSULT LETTER
[Dear  ___] : Dear  [unfilled], [Consult Letter:] : I had the pleasure of evaluating your patient, [unfilled]. [Please see my note below.] : Please see my note below. [Consult Closing:] : Thank you very much for allowing me to participate in the care of this patient.  If you have any questions, please do not hesitate to contact me. [Sincerely,] : Sincerely, [FreeTextEntry2] : Dr. Vipul Machuca [FreeTextEntry3] : [facility]\par [facility] [place]\par [facility]

## 2022-08-05 NOTE — HISTORY OF PRESENT ILLNESS
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 Patient presented to her PCP(Dr. Shaikh) with generalized weakness x 1-2 months and abnormal LFTs. Patient sent to the ED for further workup on 7/19 @ [facility]. Patient admitted w/ RUQ abd pain, jaundice (Tbili: 3.6, peaked to 10.4 on 7/26). c/o N/V x3 days, dark urine and pale colored stools \par \par  \par CT A/P on 7/19 showed a Diffuse intrahepatic and extra hepatic biliary ductal dilatation with common bile duct measuring 1.3 cm with abrupt narrowing near the ampulla. Biliary obstruction possibly secondary to ampullary mass, biliary stricture or small stone. No focal hypoenhancing pancreatic lesion or Dilatation of the main pancreatic duct.\par \par MRCP on 7/20 showed a Biliary duct dilation with abrupt cut off near the ampulla, where there is an associated 2.4 cm periampullary lesion. Mild prominence of the pancreatic duct to 4 mm in the head. 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 Distended gallbladder with sludge. Mild nonspecific edematous gallbladder Wall thickening. 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 She still struggles with pruritis and has not found anything to help her.

## 2022-08-05 NOTE — REVIEW OF SYSTEMS
[Chills] : chills [Fatigue] : fatigue [Vision Problems] : vision problems [Loss of Hearing] : loss of hearing [Constipation] : constipation [Incontinence] : incontinence [Muscle Weakness] : muscle weakness [Skin Wound] : skin wound [Difficulty Walking] : difficulty walking [Insomnia] : insomnia [Negative] : Allergic/Immunologic [Eye Pain] : no eye pain [Red Eyes] : eyes not red [Dry Eyes] : no dryness of the eyes [Dysphagia] : no dysphagia [Nosebleeds] : no nosebleeds [Hoarseness] : no hoarseness [Odynophagia] : no odynophagia [Mucosal Pain] : no mucosal pain [Chest Pain] : no chest pain [Palpitations] : no palpitations [Leg Claudication] : no intermittent leg claudication [Lower Ext Edema] : no lower extremity edema [Abdominal Pain] : no abdominal pain [Vomiting] : no vomiting [Diarrhea] : no diarrhea [Dysuria] : no dysuria [Vaginal Discharge] : no vaginal discharge [Dysmenorrhea/Abn Vaginal Bleeding] : no dysmenorrhea/abnormal vaginal bleeding [Joint Pain] : no joint pain [Joint Stiffness] : no joint stiffness [Skin Rash] : no skin rash [Confused] : no confusion [Dizziness] : no dizziness [Fainting] : no fainting [Suicidal] : not suicidal [Anxiety] : no anxiety [Depression] : no depression [Easy Bleeding] : no tendency for easy bleeding [Easy Bruising] : no tendency for easy bruising [Swollen Glands] : no swollen glands [FreeTextEntry2] : +weakness [FreeTextEntry3] : +blurry vision on/off [FreeTextEntry4] : h/o post nasal drip [FreeTextEntry5] : h/o syncopal episode x 1 [FreeTextEntry7] : h/o tarry stool 1 month ago [FreeTextEntry8] : urine incontinence [FreeTextEntry9] : + requires walker due weakness/fatigue [de-identified] : +itchy skin, yellowing skin [de-identified] : +walks off balance [de-identified] : h/o anemia

## 2022-08-12 ENCOUNTER — APPOINTMENT (OUTPATIENT)
Dept: INTERNAL MEDICINE | Facility: CLINIC | Age: 87
End: 2022-08-12

## 2022-08-12 ENCOUNTER — NON-APPOINTMENT (OUTPATIENT)
Age: 87
End: 2022-08-12

## 2022-08-12 VITALS
DIASTOLIC BLOOD PRESSURE: 70 MMHG | OXYGEN SATURATION: 94 % | HEART RATE: 45 BPM | SYSTOLIC BLOOD PRESSURE: 130 MMHG | TEMPERATURE: 98 F

## 2022-08-12 VITALS — WEIGHT: 139 LBS | BODY MASS INDEX: 25.91 KG/M2 | HEIGHT: 61.57 IN

## 2022-08-12 PROBLEM — H90.3 SENSORINEURAL HEARING LOSS (SNHL) OF BOTH EARS: Status: ACTIVE | Noted: 2022-03-18

## 2022-08-12 PROBLEM — G60.9 IDIOPATHIC PERIPHERAL NEUROPATHY: Status: ACTIVE | Noted: 2022-03-16

## 2022-08-12 PROBLEM — N39.3 FEMALE STRESS INCONTINENCE: Status: ACTIVE | Noted: 2017-10-02

## 2022-08-12 PROCEDURE — 99215 OFFICE O/P EST HI 40 MIN: CPT | Mod: 25

## 2022-08-12 PROCEDURE — 36415 COLL VENOUS BLD VENIPUNCTURE: CPT

## 2022-08-12 PROCEDURE — 93000 ELECTROCARDIOGRAM COMPLETE: CPT | Mod: 59

## 2022-08-12 PROCEDURE — 82270 OCCULT BLOOD FECES: CPT

## 2022-08-12 NOTE — REVIEW OF SYSTEMS
[FreeTextEntry5] : Is a nurse told the patient that she had tachycardia.  In the heat she feels some shortness of breath but otherwise no cardiopulmonary symptoms

## 2022-08-12 NOTE — ASSESSMENT
[FreeTextEntry4] : We are awaiting her CBC and chemistries T PTT.  Her EKG shows a sinus rhythm at 92 with a left bundle branch block and poor R wave progression which is unchanged with the exception of tachycardia from previous cardiograms.  Recent echocardiogram shows good left ventricular function [FreeTextEntry6] : no asa,nsaids

## 2022-08-12 NOTE — HISTORY OF PRESENT ILLNESS
[Chronic Anticoagulation] : no chronic anticoagulation [Chronic Kidney Disease] : no chronic kidney disease [Diabetes] : no diabetes [FreeTextEntry1] : Endoscopy with biopsy [FreeTextEntry4] : The patient is an 89-year-old female who comes in for preop clearance.  Her history is significant for GERD, urinary incontinence, hypertension hyperlipidemia and she was recently hospitalized for obstructive jaundice and found to have a mass in the ampulla and is to get biopsy.  In the hospital she was found to be anemic and received packed cells and her last hemoglobin was 10.2.  Since home the patient has generalized weakness and is using a walker to get around but denies focalizing symptoms.  She has been diagnosed with idiopathic peripheral neuropathy in the past [FreeTextEntry7] : The EKG shows sinus tachycardia at 92 with a known left bundle branch block which is not changed with poor R wave progression which is slightly increased.  Patient had a recent echocardiogram in the hospital during her work-up which showed good left ventricular function and no other significant abnormalities

## 2022-08-12 NOTE — PHYSICAL EXAM
[de-identified] : The patient has lost significant weight but she is mentally clear and alert and is walking with a walker and has mild jaundice [de-identified] : Intermittent tachycardia awaiting to evaluate the EKG [de-identified] : No significant masses [FreeTextEntry1] : Guaiac negative [de-identified] : No adenopathy [de-identified] : Thyroid not palpable [de-identified] : Jaundiced with multiple scratch marks [de-identified] : The patient is able to get up out of a chair and to get up on her toes.  Hand to nose is normal and arm strength is good

## 2022-08-12 NOTE — PLAN
[FreeTextEntry1] : The patient is to continue all medications including losartan 100 mg cholestyramine hydrochlorothiazide 25 mg omeprazole and her urinary antispasmodic.  She is not to take any aspirin NSAIDS vitamins or over-the-counter medications for 1 week.  She can use Tylenol as needed

## 2022-08-13 LAB
ALBUMIN SERPL ELPH-MCNC: 3.7 G/DL
ALP BLD-CCNC: 462 U/L
ALT SERPL-CCNC: 133 U/L
ANION GAP SERPL CALC-SCNC: 13 MMOL/L
APTT BLD: 31.9 SEC
AST SERPL-CCNC: 44 U/L
BASOPHILS # BLD AUTO: 0.04 K/UL
BASOPHILS NFR BLD AUTO: 0.7 %
BILIRUB SERPL-MCNC: 1.3 MG/DL
BUN SERPL-MCNC: 24 MG/DL
CALCIUM SERPL-MCNC: 10.1 MG/DL
CHLORIDE SERPL-SCNC: 110 MMOL/L
CO2 SERPL-SCNC: 14 MMOL/L
CREAT SERPL-MCNC: 1.44 MG/DL
EGFR: 35 ML/MIN/1.73M2
EOSINOPHIL # BLD AUTO: 0.03 K/UL
EOSINOPHIL NFR BLD AUTO: 0.5 %
GLUCOSE SERPL-MCNC: 123 MG/DL
HCT VFR BLD CALC: 32.1 %
HGB BLD-MCNC: 10.1 G/DL
IMM GRANULOCYTES NFR BLD AUTO: 0.2 %
INR PPP: 0.98 RATIO
LYMPHOCYTES # BLD AUTO: 1.1 K/UL
LYMPHOCYTES NFR BLD AUTO: 18.4 %
MAN DIFF?: NORMAL
MCHC RBC-ENTMCNC: 28.8 PG
MCHC RBC-ENTMCNC: 31.5 GM/DL
MCV RBC AUTO: 91.5 FL
MONOCYTES # BLD AUTO: 0.55 K/UL
MONOCYTES NFR BLD AUTO: 9.2 %
NEUTROPHILS # BLD AUTO: 4.26 K/UL
NEUTROPHILS NFR BLD AUTO: 71 %
PLATELET # BLD AUTO: 253 K/UL
POTASSIUM SERPL-SCNC: 4.3 MMOL/L
PROT SERPL-MCNC: 6.9 G/DL
PT BLD: 11.6 SEC
RBC # BLD: 3.51 M/UL
RBC # FLD: 16.1 %
SODIUM SERPL-SCNC: 137 MMOL/L
WBC # FLD AUTO: 5.99 K/UL

## 2022-08-14 ENCOUNTER — APPOINTMENT (OUTPATIENT)
Dept: NUCLEAR MEDICINE | Facility: IMAGING CENTER | Age: 87
End: 2022-08-14

## 2022-08-14 ENCOUNTER — OUTPATIENT (OUTPATIENT)
Dept: OUTPATIENT SERVICES | Facility: HOSPITAL | Age: 87
LOS: 1 days | End: 2022-08-14
Payer: MEDICARE

## 2022-08-14 DIAGNOSIS — K86.89 OTHER SPECIFIED DISEASES OF PANCREAS: ICD-10-CM

## 2022-08-14 DIAGNOSIS — Z98.891 HISTORY OF UTERINE SCAR FROM PREVIOUS SURGERY: Chronic | ICD-10-CM

## 2022-08-14 PROCEDURE — A9552: CPT

## 2022-08-14 PROCEDURE — 78815 PET IMAGE W/CT SKULL-THIGH: CPT | Mod: 26,PI,MH

## 2022-08-14 PROCEDURE — 78815 PET IMAGE W/CT SKULL-THIGH: CPT

## 2022-08-15 ENCOUNTER — TRANSCRIPTION ENCOUNTER (OUTPATIENT)
Age: 87
End: 2022-08-15

## 2022-08-18 ENCOUNTER — OUTPATIENT (OUTPATIENT)
Dept: OUTPATIENT SERVICES | Facility: HOSPITAL | Age: 87
LOS: 1 days | End: 2022-08-18
Payer: MEDICARE

## 2022-08-18 ENCOUNTER — APPOINTMENT (OUTPATIENT)
Dept: GASTROENTEROLOGY | Facility: HOSPITAL | Age: 87
End: 2022-08-18

## 2022-08-18 ENCOUNTER — TRANSCRIPTION ENCOUNTER (OUTPATIENT)
Age: 87
End: 2022-08-18

## 2022-08-18 ENCOUNTER — RESULT REVIEW (OUTPATIENT)
Age: 87
End: 2022-08-18

## 2022-08-18 VITALS
OXYGEN SATURATION: 97 % | TEMPERATURE: 98 F | HEIGHT: 63 IN | HEART RATE: 72 BPM | RESPIRATION RATE: 18 BRPM | DIASTOLIC BLOOD PRESSURE: 63 MMHG | SYSTOLIC BLOOD PRESSURE: 144 MMHG | WEIGHT: 138.01 LBS

## 2022-08-18 VITALS
OXYGEN SATURATION: 99 % | HEART RATE: 82 BPM | SYSTOLIC BLOOD PRESSURE: 111 MMHG | RESPIRATION RATE: 20 BRPM | DIASTOLIC BLOOD PRESSURE: 65 MMHG

## 2022-08-18 DIAGNOSIS — D13.5 BENIGN NEOPLASM OF EXTRAHEPATIC BILE DUCTS: ICD-10-CM

## 2022-08-18 DIAGNOSIS — Z98.891 HISTORY OF UTERINE SCAR FROM PREVIOUS SURGERY: Chronic | ICD-10-CM

## 2022-08-18 DIAGNOSIS — H90.3 SENSORINEURAL HEARING LOSS, BILATERAL: ICD-10-CM

## 2022-08-18 DIAGNOSIS — N39.3 STRESS INCONTINENCE (FEMALE) (MALE): ICD-10-CM

## 2022-08-18 DIAGNOSIS — G60.9 HEREDITARY AND IDIOPATHIC NEUROPATHY, UNSPECIFIED: ICD-10-CM

## 2022-08-18 PROCEDURE — 43239 EGD BIOPSY SINGLE/MULTIPLE: CPT

## 2022-08-18 PROCEDURE — 88305 TISSUE EXAM BY PATHOLOGIST: CPT | Mod: 26

## 2022-08-18 PROCEDURE — 88305 TISSUE EXAM BY PATHOLOGIST: CPT

## 2022-08-18 RX ORDER — LIDOCAINE HCL 20 MG/ML
4 VIAL (ML) INJECTION ONCE
Refills: 0 | Status: DISCONTINUED | OUTPATIENT
Start: 2022-08-18 | End: 2022-08-18

## 2022-08-18 RX ORDER — LIDOCAINE HCL 20 MG/ML
4 VIAL (ML) INJECTION ONCE
Refills: 0 | Status: DISCONTINUED | OUTPATIENT
Start: 2022-08-18 | End: 2022-09-01

## 2022-08-18 RX ORDER — LIDOCAINE HCL 20 MG/ML
8 VIAL (ML) INJECTION ONCE
Refills: 0 | Status: DISCONTINUED | OUTPATIENT
Start: 2022-08-18 | End: 2022-08-18

## 2022-08-18 NOTE — ASU DISCHARGE PLAN (ADULT/PEDIATRIC) - NS MD DC FALL RISK RISK
For information on Fall & Injury Prevention, visit: [email or web address] OR  [email or web address] OR  https://www.cdc.gov/steadi/patient.html

## 2022-08-18 NOTE — PRE PROCEDURE NOTE - PRE PROCEDURE EVALUATION
Attending Physician:     Krupa                       Procedure: EGD     Indication for Procedure: Ampullary mass biopsy  ________________________________________________________  PAST MEDICAL & SURGICAL HISTORY:  HLD (hyperlipidemia)      GERD (gastroesophageal reflux disease)      HTN (hypertension)      H/O:   x2        ALLERGIES:  penicillin (Unknown)  tetracaine (Unknown)    HOME MEDICATIONS:  Melatonin 5 mg oral tablet: 1 tab(s) orally once a day (at bedtime)  omeprazole 20 mg oral delayed release capsule: 1 cap(s) orally once a day  zolpidem 5 mg oral tablet: 1 tab(s) orally once a day (at bedtime)    AICD/PPM: [ ] yes   [x] no    PERTINENT LAB DATA:                      PHYSICAL EXAMINATION:    T(C): --  HR: --  BP: --  RR: --  SpO2: --    Constitutional: NAD    Neck:  No JVD  Respiratory: CTAB/L  Cardiovascular: S1 and S2  Gastrointestinal: BS+, soft, NT/ND  Extremities: No peripheral edema  Neurological: A/O x 3, no focal deficits        COMMENTS:    The patient is a suitable candidate for the planned procedure unless box checked [ ]  No, explain:

## 2022-08-24 ENCOUNTER — NON-APPOINTMENT (OUTPATIENT)
Age: 87
End: 2022-08-24

## 2022-08-24 ENCOUNTER — APPOINTMENT (OUTPATIENT)
Dept: INTERNAL MEDICINE | Facility: CLINIC | Age: 87
End: 2022-08-24

## 2022-08-24 VITALS
TEMPERATURE: 98 F | HEIGHT: 61 IN | WEIGHT: 138 LBS | OXYGEN SATURATION: 97 % | DIASTOLIC BLOOD PRESSURE: 70 MMHG | HEART RATE: 52 BPM | SYSTOLIC BLOOD PRESSURE: 130 MMHG | BODY MASS INDEX: 26.06 KG/M2

## 2022-08-24 DIAGNOSIS — R17 UNSPECIFIED JAUNDICE: ICD-10-CM

## 2022-08-24 DIAGNOSIS — R00.0 TACHYCARDIA, UNSPECIFIED: ICD-10-CM

## 2022-08-24 PROCEDURE — 36415 COLL VENOUS BLD VENIPUNCTURE: CPT

## 2022-08-24 PROCEDURE — 93000 ELECTROCARDIOGRAM COMPLETE: CPT

## 2022-08-24 PROCEDURE — 99213 OFFICE O/P EST LOW 20 MIN: CPT | Mod: 25

## 2022-08-24 NOTE — HISTORY OF PRESENT ILLNESS
[de-identified] : The patient is a 89-year-old female who likely has pancreatic cancer with biliary obstruction has stent in place but biopsies have been negative to date.  She comes in because she was noted to have tachycardia following physical therapy.  She is also been noted to have bradycardia on EKGs with a left bundle branch block.  At the present time she denies chest pain palpitation swelling fainting or dyspnea and she notes that her recent pulses been approximately 100.  She comes in for further evaluation of her arrhythmia

## 2022-08-24 NOTE — ASSESSMENT
[FreeTextEntry1] : At the present time there is no evidence of tachycardia but she may have an underlying sick sinus syndrome we are getting EKG to check.  She will monitor her rate at home and if she again has tachycardia we will consider cardiovascular evaluation we are getting thyroids as well as CBC and chemistries.  EKG is unchanged and shows left bundle branch block with poor R wave progression across the precordium and there is 1 area of tachycardia and a left bundle branch block for 3 beats

## 2022-08-24 NOTE — PHYSICAL EXAM
[Normal] : no posterior cervical lymphadenopathy and no anterior cervical lymphadenopathy [de-identified] : The patient's pulse after exercising is 98 and goes down to 80 when she is resting S1 and S2 are normal and there are no murmurs or gallops [de-identified] : Walking with a rolling walker

## 2022-08-25 LAB
ALBUMIN SERPL ELPH-MCNC: 3.9 G/DL
ALP BLD-CCNC: 256 U/L
ALT SERPL-CCNC: 25 U/L
ANION GAP SERPL CALC-SCNC: 11 MMOL/L
AST SERPL-CCNC: 19 U/L
BASOPHILS # BLD AUTO: 0.03 K/UL
BASOPHILS NFR BLD AUTO: 0.4 %
BILIRUB SERPL-MCNC: 0.8 MG/DL
BUN SERPL-MCNC: 19 MG/DL
CALCIUM SERPL-MCNC: 10.1 MG/DL
CHLORIDE SERPL-SCNC: 105 MMOL/L
CO2 SERPL-SCNC: 23 MMOL/L
CREAT SERPL-MCNC: 1.37 MG/DL
EGFR: 37 ML/MIN/1.73M2
EOSINOPHIL # BLD AUTO: 0.05 K/UL
EOSINOPHIL NFR BLD AUTO: 0.6 %
GLUCOSE SERPL-MCNC: 104 MG/DL
HCT VFR BLD CALC: 32.9 %
HGB BLD-MCNC: 10 G/DL
IMM GRANULOCYTES NFR BLD AUTO: 0.4 %
LYMPHOCYTES # BLD AUTO: 1.81 K/UL
LYMPHOCYTES NFR BLD AUTO: 22.3 %
MAN DIFF?: NORMAL
MCHC RBC-ENTMCNC: 28.2 PG
MCHC RBC-ENTMCNC: 30.4 GM/DL
MCV RBC AUTO: 92.9 FL
MONOCYTES # BLD AUTO: 0.69 K/UL
MONOCYTES NFR BLD AUTO: 8.5 %
NEUTROPHILS # BLD AUTO: 5.52 K/UL
NEUTROPHILS NFR BLD AUTO: 67.8 %
PLATELET # BLD AUTO: 230 K/UL
POTASSIUM SERPL-SCNC: 4.5 MMOL/L
PROT SERPL-MCNC: 6.8 G/DL
RBC # BLD: 3.54 M/UL
RBC # FLD: 15.3 %
SODIUM SERPL-SCNC: 140 MMOL/L
T3 SERPL-MCNC: 103 NG/DL
T4 SERPL-MCNC: 8.5 UG/DL
TSH SERPL-ACNC: 0.96 UIU/ML
WBC # FLD AUTO: 8.13 K/UL

## 2022-09-07 ENCOUNTER — APPOINTMENT (OUTPATIENT)
Dept: HEMATOLOGY ONCOLOGY | Facility: CLINIC | Age: 87
End: 2022-09-07

## 2022-09-07 VITALS
SYSTOLIC BLOOD PRESSURE: 110 MMHG | WEIGHT: 150.99 LBS | BODY MASS INDEX: 28.51 KG/M2 | TEMPERATURE: 97 F | HEIGHT: 61 IN | HEART RATE: 154 BPM | DIASTOLIC BLOOD PRESSURE: 74 MMHG | OXYGEN SATURATION: 97 % | RESPIRATION RATE: 16 BRPM

## 2022-09-07 PROCEDURE — 99214 OFFICE O/P EST MOD 30 MIN: CPT

## 2022-09-07 NOTE — ASSESSMENT
[FreeTextEntry1] : A discussion took place with the patient and her son regarding further management.  The patient has undergone 2 endoscopic ultrasound biopsies of the ampullary mass which have been benign and the last one indicates a tubular adenoma was biopsied.  He was again explained that although the results are good there is still concern that there may be an underlying malignant tumor in place which was missed by the biopsies.  The patient has undergone PET/CT which shows uptake with SUV of 9 in the ampullary area with no evidence of metastases indicating localized lesion.  We did recommend that she consider surgical consultation and she will schedule an appointment with Dr. Machuca for evaluation regarding surgical resection.  Otherwise she will undergo follow-up in the next 2 to 3 months and will be seen by GI for possible repeat biopsy.  He was explained that this time we would not recommend chemotherapy or radiation therapy unless there was a definitive diagnosis of malignancy made.  Overall the patient feels well and has no symptoms at this time and has a very good performance status and quality of life.  Once we have more information further recommendations will be made. [Curative] : Goals of care discussed with patient: Curative [Palliative Care Plan] : not applicable at this time

## 2022-09-07 NOTE — REVIEW OF SYSTEMS
[Fatigue] : fatigue [Constipation] : constipation [Insomnia] : insomnia [Anxiety] : anxiety [Negative] : Allergic/Immunologic

## 2022-09-07 NOTE — HISTORY OF PRESENT ILLNESS
[Disease: _____________________] : Disease: [unfilled] [de-identified] : 88 yo female with PMHX of Hypertension, Hyperlipidemia comes for evaluation of ampullary mass. Patient history dates back to early 2022. Patient c/o orange urine, pale stools and itchy skin and intermittent RUQ abdominal pain. Patient was sent by PCP who was concern for malignancy. Patient was admitted to [facility] of 2022 for evaluation. She denies any  fevers, chills, or night sweats. She had decrease appetite, but no weight loss. While in the hospital a CT-Scan of Abdomen/Pelvis was done that revealed a mass in ampullary.  A MRI/MRCP of abdomen was done.\par   \par 2022, CT-Scan of Abdomen/Pelvis:\par Diffuse intrahepatic and extra hepatic biliary ductal dilatation with common bile duct measuring 1.3 cm with abrupt narrowing near the ampulla. Biliary obstruction possibly secondary to ampullary mass, biliary stricture or small stone with  no focal hypoenhancing pancreatic lesion or dilatation of the main pancreatic duct. Further evaluation can be obtained with MRCP with IV contrast.\par  \par 2022, Noncontrast abdominal MRI/MRCP:\par Biliary duct dilation with abrupt cut off near the ampulla, where there is an associated 2.4 cm periampullary lesion. Mild prominence of the pancreatic duct to 4 mm in the head. 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 Further evaluation with upper endoscopy/ERCP is recommended. Distended gallbladder with sludge. Mild nonspecific edematous gallbladder wall thickening. Right adnexal cyst measuring 1.7 cm.\par \par 2022, Biopsy of Duodenal was performed: Pathology report revealed: Duodenum, biopsy-Duodenal adenoma with high-grade dysplasia, fragments, see note. Note: Given the worrisome clinical impression of a "mass" the biopsy may not be entirely representative and possibility of an unsampled adenocarcinoma cannot be excluded with certainty. Correlation with endoscopic/radiographic impression is imperative. It there is ongoing concern, a repeat deeper biopsy may be helpful of clinically indicated.\par  \par 2022, CT-Chest:\par Subcentimeter left upper lobe groundglass nodule, likely not metastasis. Indeterminate sclerosis of the anterior aspect of the left fifth rib, possibly posttraumatic.\par \par Patient is a . She lives with one son. She had 3 children.  One son is  at age 48 years old post choking on food; 2nd adult son has Fragile X syndrome who is  mentally disable and Oldest son has Liver Cirrhosis due to HBV. Patient has never smoked and she does not drink alcohol. 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 \par \par \par  [de-identified] : Since the last visit the patient overall remains well but complains of weakness, itchy skin, insomnia and anxiety.  Patient had repeat biopsy of the ampullary  mass which was benign described as a tubular adenoma.

## 2022-09-23 ENCOUNTER — APPOINTMENT (OUTPATIENT)
Dept: SURGICAL ONCOLOGY | Facility: CLINIC | Age: 87
End: 2022-09-23

## 2022-09-23 VITALS
WEIGHT: 145 LBS | DIASTOLIC BLOOD PRESSURE: 85 MMHG | TEMPERATURE: 98.5 F | OXYGEN SATURATION: 98 % | RESPIRATION RATE: 17 BRPM | HEIGHT: 63 IN | SYSTOLIC BLOOD PRESSURE: 144 MMHG | HEART RATE: 52 BPM | BODY MASS INDEX: 25.69 KG/M2

## 2022-09-23 PROCEDURE — 99214 OFFICE O/P EST MOD 30 MIN: CPT

## 2022-09-26 ENCOUNTER — APPOINTMENT (OUTPATIENT)
Dept: ORTHOPEDIC SURGERY | Facility: CLINIC | Age: 87
End: 2022-09-26

## 2022-09-26 PROCEDURE — 20550 NJX 1 TENDON SHEATH/LIGAMENT: CPT | Mod: F5

## 2022-09-26 PROCEDURE — 99204 OFFICE O/P NEW MOD 45 MIN: CPT | Mod: 25

## 2022-09-26 NOTE — ASSESSMENT
[FreeTextEntry1] : 89 year old female presents with a right thumb trigger digit. We discussed the uncertain prognosis and nature of this condition as well as treatment options. She has elected to receive a steroid injection today and tolerated this well. The patient understands that the steroid may take 48 hours to begin reducing inflammation and will continue to do so over the course of the next week.  The finger may be more sore tonight due the volume of fluid introduced into the tendon sheath when the anesthetic wears off.  She should return to see me if there is persistent triggering or pain at 2 weeks to discuss a second injection and/or surgical release.

## 2022-09-26 NOTE — PROCEDURE
[] : Trigger Finger: After full discussion of treatment alternatives, and associated risks, complication, limitations, and expectations, and with patient verbal consent following verbal timeout site verification, steroid injections were administered. Following sterile prep with sterile method, 0.5cc Bupivicaine and 6 mg celestone generic were injected into the flexor tendon sheaths of the right [Thumb] : thumb

## 2022-09-26 NOTE — PHYSICAL EXAM
[de-identified] : Patient is alert, oriented and in no acute distress. Affect and general appearance are normal and patient is able to answer questions appropriately. On the right, there is tenderness to palpation over the thumb A1 pulley with active triggering. \par \par Neurologic: Median, ulnar, and radial motor and sensory are intact. \par Skin: No cyanosis, clubbing, edema or rashes. \par Vascular: Radial pulses intact. \par Lymphatic: No streaking or epitrochlear adenopathy.

## 2022-09-26 NOTE — HISTORY OF PRESENT ILLNESS
[FreeTextEntry1] : She reports triggering of the right thumb. This has been going since about 2 months ago. There is stiffness in her hands which is worse in the morning. No other fingers are triggering currently. She denies any locking of the digits. She has had zero injections in the past for this problem. Medications have not been helpful.  She describes the pain over the A1 pulley. Occasionally it radiates to the dorsal proximal interphalangeal joint. She does not report a history of diabetes.\par

## 2022-09-28 ENCOUNTER — RX RENEWAL (OUTPATIENT)
Age: 87
End: 2022-09-28

## 2022-10-05 PROCEDURE — 77263 THER RADIOLOGY TX PLNG CPLX: CPT

## 2022-10-06 ENCOUNTER — OUTPATIENT (OUTPATIENT)
Dept: OUTPATIENT SERVICES | Facility: HOSPITAL | Age: 87
LOS: 1 days | Discharge: HOME | End: 2022-10-06
Payer: MEDICARE

## 2022-10-06 DIAGNOSIS — Z98.891 HISTORY OF UTERINE SCAR FROM PREVIOUS SURGERY: Chronic | ICD-10-CM

## 2022-10-07 ENCOUNTER — NON-APPOINTMENT (OUTPATIENT)
Age: 87
End: 2022-10-07

## 2022-10-07 PROCEDURE — 77290 THER RAD SIMULAJ FIELD CPLX: CPT | Mod: 26

## 2022-10-07 PROCEDURE — 77334 RADIATION TREATMENT AID(S): CPT | Mod: 26

## 2022-10-10 NOTE — HISTORY OF PRESENT ILLNESS
[de-identified] : Ms. SONJA WOOD is an 89yF w/ PMH sig for HTN and HLD. She presents for initial consultation regarding an ampullary mass. In July 2022, she noticed dark colored urine, pale stools, and RUQ abdominal pain. She was seen at [facility] on 7/19/22 where a CT scan showed diffuse intra and extra hepatic biliary ductal dilation, a 1.3cm CBD w/ abrupt narrowing at the ampulla. She had a follow up MRCP on 7/19/22 that showed biliary dilation w/ abrupt cutoff at the ampulla associated w/ a 2.4cm periampullary lesion. 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 She had a covered metal stent placed at that time to decompress her biliary system.\par \par Today, she reports feeling well. Her abdominal pain has resolved. She denies any more dark urine, acholic stools, pruritus, or jaundice. She currently lives with her son, who helps her with her day to day activities. She no longer requires the aid of a walker now that has has recovered from her initial illness in July. She does not smoke or drink alcohol. She has no personal or familial history of malignancy. She is accompanied by her son, who has undergone a successful liver transplant. [place] for [place] with mental [place]. She is a retired psychologist.

## 2022-10-10 NOTE — ASSESSMENT
[FreeTextEntry1] : Ms. SONJA WOOD is an 89yF w/ PMH sig for HTN and HLD. She presents for initial consultation regarding an ampullary mass. In July 2022, she noticed dark colored urine, pale stools, and RUQ abdominal pain. She was seen at [facility] on 7/19/22 where a CT scan showed diffuse intra and extra hepatic biliary ductal dilation, a 1.3cm CBD w/ abrupt narrowing at the ampulla. She had a follow up MRCP on 7/19/22 that showed biliary dilation w/ abrupt cutoff at the ampulla associated w/ a 2.4cm periampullary lesion. 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 She had a covered metal stent placed at that time to decompress her biliary system.\par \par We discussed that the diagnosis of a periampullary adenoma with high grade dysplasia would require a pancreaticoduodenectomy (Whipple) to completely resect. The operation would require a 1 week hospital stay and would take approximately 8-12 weeks for her to return to feeling like she did before the surgery. The operation carries with it a ~3% mortality risk in octogenarians and older as compared to ~1.5% in younger patients.  Right now, she is very concerned about her other adult son w/ special needs and states she can not afford to take 2-3 months to recover from a surgery. She understands that without resection, this mass will continue to grow and cause biliary and pancreatic obstruction. She understands that this is a potentially surgically curable malignancy, but would like to focus on her family at this time. We will be available for any further questions or surgical planning should she change her mind about surgical intervention. She was encouraged to reach out to our office if she would like to talk further re: surgery or if her condition changes. \par \par Today, I personally spent 60 minutes in total time including reviewing imaging and studies, discussing complex treatment regimens, direct face to face time with the patient, patient education and counseling.

## 2022-10-10 NOTE — PHYSICAL EXAM
[FreeTextEntry1] : Gen: NAD, WD/WN\par HEENT: NC/AT, No scleral icterus\par Resp: No increased WoB\par CV: RRR\par GI: Soft, ND/NT, no r/g\par MSK: FROM, No deformities\par Neuro: AAOx3, No focal deficits\par Psych: Appropriate mood and affect

## 2022-10-21 ENCOUNTER — OUTPATIENT (OUTPATIENT)
Dept: OUTPATIENT SERVICES | Facility: HOSPITAL | Age: 87
LOS: 1 days | Discharge: HOME | End: 2022-10-21

## 2022-10-21 DIAGNOSIS — M26.601 RIGHT TEMPOROMANDIBULAR JOINT DISORDER, UNSPECIFIED: ICD-10-CM

## 2022-10-21 DIAGNOSIS — Z98.891 HISTORY OF UTERINE SCAR FROM PREVIOUS SURGERY: Chronic | ICD-10-CM

## 2022-10-23 ENCOUNTER — RX RENEWAL (OUTPATIENT)
Age: 87
End: 2022-10-23

## 2022-10-24 ENCOUNTER — APPOINTMENT (OUTPATIENT)
Dept: ORTHOPEDIC SURGERY | Facility: CLINIC | Age: 87
End: 2022-10-24
Payer: MEDICARE

## 2022-10-24 PROCEDURE — 99214 OFFICE O/P EST MOD 30 MIN: CPT

## 2022-10-26 ENCOUNTER — APPOINTMENT (OUTPATIENT)
Dept: ORTHOPEDIC SURGERY | Facility: CLINIC | Age: 87
End: 2022-10-26

## 2022-10-26 PROCEDURE — 26055 INCISE FINGER TENDON SHEATH: CPT

## 2022-10-31 PROCEDURE — 77334 RADIATION TREATMENT AID(S): CPT | Mod: 26,59

## 2022-10-31 PROCEDURE — 77300 RADIATION THERAPY DOSE PLAN: CPT | Mod: 26

## 2022-10-31 PROCEDURE — 77338 DESIGN MLC DEVICE FOR IMRT: CPT | Mod: 26

## 2022-10-31 PROCEDURE — 77301 RADIOTHERAPY DOSE PLAN IMRT: CPT | Mod: 26

## 2022-11-01 ENCOUNTER — NON-APPOINTMENT (OUTPATIENT)
Age: 87
End: 2022-11-01

## 2022-11-01 PROCEDURE — 77427 RADIATION TX MANAGEMENT X5: CPT

## 2022-11-01 PROCEDURE — 77387B: CUSTOM | Mod: 26

## 2022-11-01 NOTE — VITALS
[70: Cares for self; unalbe to carry on normal activity or do active work.] : 70: Cares for self; unable to carry on normal activity or do active work. [70: Both greater restriction of and less time spent in play activity.] : 70: Both greater restriction of and less time spent in play activity.

## 2022-11-02 ENCOUNTER — RESULT REVIEW (OUTPATIENT)
Age: 87
End: 2022-11-02

## 2022-11-02 ENCOUNTER — APPOINTMENT (OUTPATIENT)
Dept: HEMATOLOGY ONCOLOGY | Facility: CLINIC | Age: 87
End: 2022-11-02

## 2022-11-02 VITALS
OXYGEN SATURATION: 98 % | HEART RATE: 65 BPM | TEMPERATURE: 98.6 F | DIASTOLIC BLOOD PRESSURE: 76 MMHG | WEIGHT: 148.59 LBS | BODY MASS INDEX: 26.33 KG/M2 | HEIGHT: 63 IN | SYSTOLIC BLOOD PRESSURE: 123 MMHG | RESPIRATION RATE: 17 BRPM

## 2022-11-02 LAB
BASOPHILS # BLD AUTO: 0.04 K/UL — SIGNIFICANT CHANGE UP (ref 0–0.2)
BASOPHILS NFR BLD AUTO: 0.6 % — SIGNIFICANT CHANGE UP (ref 0–2)
EOSINOPHIL # BLD AUTO: 0.13 K/UL — SIGNIFICANT CHANGE UP (ref 0–0.5)
EOSINOPHIL NFR BLD AUTO: 2.1 % — SIGNIFICANT CHANGE UP (ref 0–6)
HCT VFR BLD CALC: 30.5 % — LOW (ref 34.5–45)
HGB BLD-MCNC: 10.1 G/DL — LOW (ref 11.5–15.5)
IMM GRANULOCYTES NFR BLD AUTO: 1.1 % — HIGH (ref 0–0.9)
LYMPHOCYTES # BLD AUTO: 1.13 K/UL — SIGNIFICANT CHANGE UP (ref 1–3.3)
LYMPHOCYTES # BLD AUTO: 18.3 % — SIGNIFICANT CHANGE UP (ref 13–44)
MCHC RBC-ENTMCNC: 28.3 PG — SIGNIFICANT CHANGE UP (ref 27–34)
MCHC RBC-ENTMCNC: 33.1 G/DL — SIGNIFICANT CHANGE UP (ref 32–36)
MCV RBC AUTO: 85.4 FL — SIGNIFICANT CHANGE UP (ref 80–100)
MONOCYTES # BLD AUTO: 0.51 K/UL — SIGNIFICANT CHANGE UP (ref 0–0.9)
MONOCYTES NFR BLD AUTO: 8.3 % — SIGNIFICANT CHANGE UP (ref 2–14)
NEUTROPHILS # BLD AUTO: 4.29 K/UL — SIGNIFICANT CHANGE UP (ref 1.8–7.4)
NEUTROPHILS NFR BLD AUTO: 69.6 % — SIGNIFICANT CHANGE UP (ref 43–77)
NRBC # BLD: 0 /100 WBCS — SIGNIFICANT CHANGE UP (ref 0–0)
PLATELET # BLD AUTO: SIGNIFICANT CHANGE UP K/UL (ref 150–400)
RBC # BLD: 3.57 M/UL — LOW (ref 3.8–5.2)
RBC # FLD: 14.7 % — HIGH (ref 10.3–14.5)
WBC # BLD: 6.17 K/UL — SIGNIFICANT CHANGE UP (ref 3.8–10.5)
WBC # FLD AUTO: 6.17 K/UL — SIGNIFICANT CHANGE UP (ref 3.8–10.5)

## 2022-11-02 PROCEDURE — 77387B: CUSTOM | Mod: 26

## 2022-11-02 NOTE — REVIEW OF SYSTEMS
[Diarrhea: Grade 0] : Diarrhea: Grade 0 [Dyspepsia: Grade 0] : Dyspepsia: Grade 0 [Dysphagia: Grade 0] : Dysphagia: Grade 0 [Esophagitis: Grade 0] : Esophagitis: Grade 0 [Nausea: Grade 0] : Nausea: Grade 0 [Vomiting: Grade 0] : Vomiting: Grade 0

## 2022-11-04 PROCEDURE — 77014: CPT | Mod: 26

## 2022-11-07 ENCOUNTER — NON-APPOINTMENT (OUTPATIENT)
Age: 87
End: 2022-11-07

## 2022-11-07 ENCOUNTER — APPOINTMENT (OUTPATIENT)
Dept: ORTHOPEDIC SURGERY | Facility: CLINIC | Age: 87
End: 2022-11-07

## 2022-11-07 VITALS
OXYGEN SATURATION: 99 % | RESPIRATION RATE: 17 BRPM | BODY MASS INDEX: 26.43 KG/M2 | TEMPERATURE: 97.7 F | SYSTOLIC BLOOD PRESSURE: 107 MMHG | DIASTOLIC BLOOD PRESSURE: 69 MMHG | WEIGHT: 149.14 LBS | HEART RATE: 74 BPM | HEIGHT: 63 IN

## 2022-11-07 DIAGNOSIS — M65.311 TRIGGER THUMB, RIGHT THUMB: ICD-10-CM

## 2022-11-07 PROCEDURE — 77014: CPT | Mod: 26

## 2022-11-07 PROCEDURE — 99024 POSTOP FOLLOW-UP VISIT: CPT

## 2022-11-07 NOTE — VITALS
[70: Cares for self; unalbe to carry on normal activity or do active work.] : 70: Cares for self; unable to carry on normal activity or do active work. [70: Both greater restriction of and less time spent in play activity.] : 70: Both greater restriction of and less time spent in play activity. [Maximal Pain Intensity: 0/10] : 0/10 [Least Pain Intensity: 0/10] : 0/10 [Pain Description/Quality: ___] : Pain description/quality: [unfilled]

## 2022-11-08 PROCEDURE — 77014: CPT | Mod: 26

## 2022-11-09 PROCEDURE — 77014: CPT | Mod: 26

## 2022-11-09 PROCEDURE — 77427 RADIATION TX MANAGEMENT X5: CPT

## 2022-11-10 PROCEDURE — 77014: CPT | Mod: 26

## 2022-11-11 PROCEDURE — 77014: CPT | Mod: 26

## 2022-11-14 PROCEDURE — 77014: CPT | Mod: 26

## 2022-11-14 NOTE — ADDENDUM
[FreeTextEntry1] : I reviewed above and agree.  Patient doing well.  Continue with RT.\par RODO\par

## 2022-11-14 NOTE — DISEASE MANAGEMENT
[Pathological] : TNM Stage: p [N/A] : Currently not applicable [TTNM] : x [NTNM] : x [MTNM] : x [de-identified] : 280 cGy [de-identified] : 4200 cGy [de-identified] : Pancreas

## 2022-11-14 NOTE — HISTORY OF PRESENT ILLNESS
[FreeTextEntry1] : 89 y/o F with poor functional status and a suspicious ampullary tumor.  The patient had undergone 2 endoscopic ultrasound biopsies of the ampullary mass which have been benign and the last one indicates a tubular adenoma was biopsied.  She declined surgical resection.  We decided to proceed with treatment based on radiographic and clinical suspicion in the absence of definitive pathology. This was  communicated with the patient and her family and they are still in agreement with the plan. \par \par 11/1/2022:  Patient is seen for OTV s/p 1/15 fractions to pancreas\par

## 2022-11-15 ENCOUNTER — NON-APPOINTMENT (OUTPATIENT)
Age: 87
End: 2022-11-15

## 2022-11-15 VITALS
WEIGHT: 145 LBS | HEIGHT: 63 IN | SYSTOLIC BLOOD PRESSURE: 135 MMHG | DIASTOLIC BLOOD PRESSURE: 84 MMHG | BODY MASS INDEX: 25.69 KG/M2 | HEART RATE: 62 BPM | OXYGEN SATURATION: 99 % | RESPIRATION RATE: 17 BRPM | TEMPERATURE: 97.7 F

## 2022-11-15 PROCEDURE — 77014: CPT | Mod: 26

## 2022-11-15 NOTE — VITALS
[Pain Description/Quality: ___] : Pain description/quality: [unfilled] [Maximal Pain Intensity: 0/10] : 0/10 [Least Pain Intensity: 0/10] : 0/10 [70: Cares for self; unalbe to carry on normal activity or do active work.] : 70: Cares for self; unable to carry on normal activity or do active work. [70: Both greater restriction of and less time spent in play activity.] : 70: Both greater restriction of and less time spent in play activity. [ECOG Performance Status: 2 - Ambulatory and capable of all self care but unable to carry out any work activities] : Performance Status: 2 - Ambulatory and capable of all self care but unable to carry out any work activities. Up and about more than 50% of waking hours

## 2022-11-15 NOTE — PHYSICAL EXAM
[General Appearance - Well Developed] : well developed [Sclera] : the sclera and conjunctiva were normal [] : no respiratory distress [No Focal Deficits] : no focal deficits [Normal] : oriented to person, place and time, the affect was normal, the mood was normal and not anxious

## 2022-11-16 PROCEDURE — 77427 RADIATION TX MANAGEMENT X5: CPT

## 2022-11-16 PROCEDURE — 77014: CPT | Mod: 26

## 2022-11-17 PROCEDURE — 77014: CPT | Mod: 26

## 2022-11-18 PROCEDURE — 77014: CPT | Mod: 26

## 2022-11-18 NOTE — REVIEW OF SYSTEMS
[Dyspepsia: Grade 0] : Dyspepsia: Grade 0 [Dysphagia: Grade 0] : Dysphagia: Grade 0 [Esophagitis: Grade 0] : Esophagitis: Grade 0 [Nausea: Grade 0] : Nausea: Grade 0 [Vomiting: Grade 0] : Vomiting: Grade 0 [Constipation: Grade 0] : Constipation: Grade 0 [Diarrhea: Grade 1 - Increase of <4 stools per day over baseline; mild increase in ostomy output compared to baseline] : Diarrhea: Grade 1 - Increase of <4 stools per day over baseline; mild increase in ostomy output compared to baseline [FreeTextEntry3] : pt takes miralax uses imodium when needed

## 2022-11-18 NOTE — DISEASE MANAGEMENT
[Pathological] : TNM Stage: p [N/A] : Currently not applicable [TTNM] : x [NTNM] : x [de-identified] : 840 cGy [MTNM] : x [de-identified] : 4200 cGy [de-identified] : Pancreas

## 2022-11-20 PROCEDURE — 77014: CPT | Mod: 26

## 2022-11-21 PROCEDURE — 77014: CPT | Mod: 26

## 2022-11-21 NOTE — VITALS
[Maximal Pain Intensity: 0/10] : 0/10 [Least Pain Intensity: 0/10] : 0/10 [Pain Description/Quality: ___] : Pain description/quality: [unfilled] [70: Cares for self; unalbe to carry on normal activity or do active work.] : 70: Cares for self; unable to carry on normal activity or do active work. [70: Both greater restriction of and less time spent in play activity.] : 70: Both greater restriction of and less time spent in play activity. [ECOG Performance Status: 2 - Ambulatory and capable of all self care but unable to carry out any work activities] : Performance Status: 2 - Ambulatory and capable of all self care but unable to carry out any work activities. Up and about more than 50% of waking hours

## 2022-11-22 ENCOUNTER — NON-APPOINTMENT (OUTPATIENT)
Age: 87
End: 2022-11-22

## 2022-11-22 VITALS
RESPIRATION RATE: 18 BRPM | TEMPERATURE: 97.7 F | SYSTOLIC BLOOD PRESSURE: 127 MMHG | BODY MASS INDEX: 25.69 KG/M2 | WEIGHT: 145 LBS | OXYGEN SATURATION: 98 % | HEART RATE: 72 BPM | DIASTOLIC BLOOD PRESSURE: 81 MMHG | HEIGHT: 63 IN

## 2022-11-22 PROCEDURE — 77014: CPT | Mod: 26

## 2022-11-25 ENCOUNTER — APPOINTMENT (OUTPATIENT)
Dept: HEMATOLOGY ONCOLOGY | Facility: CLINIC | Age: 87
End: 2022-11-25

## 2022-11-25 ENCOUNTER — RESULT REVIEW (OUTPATIENT)
Age: 87
End: 2022-11-25

## 2022-11-25 VITALS
HEART RATE: 84 BPM | HEIGHT: 63 IN | OXYGEN SATURATION: 96 % | TEMPERATURE: 97.3 F | WEIGHT: 144.45 LBS | SYSTOLIC BLOOD PRESSURE: 131 MMHG | BODY MASS INDEX: 25.59 KG/M2 | DIASTOLIC BLOOD PRESSURE: 81 MMHG | RESPIRATION RATE: 18 BRPM

## 2022-11-25 LAB
BASOPHILS # BLD AUTO: 0.02 K/UL — SIGNIFICANT CHANGE UP (ref 0–0.2)
BASOPHILS NFR BLD AUTO: 0.5 % — SIGNIFICANT CHANGE UP (ref 0–2)
EOSINOPHIL # BLD AUTO: 0.07 K/UL — SIGNIFICANT CHANGE UP (ref 0–0.5)
EOSINOPHIL NFR BLD AUTO: 1.6 % — SIGNIFICANT CHANGE UP (ref 0–6)
HCT VFR BLD CALC: 33 % — LOW (ref 34.5–45)
HGB BLD-MCNC: 10.6 G/DL — LOW (ref 11.5–15.5)
IMM GRANULOCYTES NFR BLD AUTO: 0.5 % — SIGNIFICANT CHANGE UP (ref 0–0.9)
LYMPHOCYTES # BLD AUTO: 0.59 K/UL — LOW (ref 1–3.3)
LYMPHOCYTES # BLD AUTO: 13.6 % — SIGNIFICANT CHANGE UP (ref 13–44)
MCHC RBC-ENTMCNC: 28.7 PG — SIGNIFICANT CHANGE UP (ref 27–34)
MCHC RBC-ENTMCNC: 32.1 G/DL — SIGNIFICANT CHANGE UP (ref 32–36)
MCV RBC AUTO: 89.4 FL — SIGNIFICANT CHANGE UP (ref 80–100)
MONOCYTES # BLD AUTO: 0.45 K/UL — SIGNIFICANT CHANGE UP (ref 0–0.9)
MONOCYTES NFR BLD AUTO: 10.4 % — SIGNIFICANT CHANGE UP (ref 2–14)
NEUTROPHILS # BLD AUTO: 3.19 K/UL — SIGNIFICANT CHANGE UP (ref 1.8–7.4)
NEUTROPHILS NFR BLD AUTO: 73.4 % — SIGNIFICANT CHANGE UP (ref 43–77)
NRBC # BLD: 0 /100 WBCS — SIGNIFICANT CHANGE UP (ref 0–0)
PLATELET # BLD AUTO: 177 K/UL — SIGNIFICANT CHANGE UP (ref 150–400)
RBC # BLD: 3.69 M/UL — LOW (ref 3.8–5.2)
RBC # FLD: 14.7 % — HIGH (ref 10.3–14.5)
WBC # BLD: 4.34 K/UL — SIGNIFICANT CHANGE UP (ref 3.8–10.5)
WBC # FLD AUTO: 4.34 K/UL — SIGNIFICANT CHANGE UP (ref 3.8–10.5)

## 2022-11-25 PROCEDURE — 99213 OFFICE O/P EST LOW 20 MIN: CPT

## 2022-11-25 NOTE — PHYSICAL EXAM
[Ambulatory and capable of all self care but unable to carry out any work activities] : Status 2- Ambulatory and capable of all self care but unable to carry out any work activities. Up and about more than 50% of waking hours [Normal] : affect appropriate [de-identified] : +sitting in wheelchair, NAD, AOX3

## 2022-11-25 NOTE — HISTORY OF PRESENT ILLNESS
[Disease: _____________________] : Disease: [unfilled] [de-identified] : 89 yo female with PMHX of Hypertension, Hyperlipidemia comes for evaluation of ampullary mass. Patient history dates back to early 2022. Patient c/o orange urine, pale stools and itchy skin and intermittent RUQ abdominal pain. Patient was sent by PCP who was concern for malignancy. Patient was admitted to [facility] of 2022 for evaluation. She denies any  fevers, chills, or night sweats. She had decrease appetite, but no weight loss. While in the hospital a CT-Scan of Abdomen/Pelvis was done that revealed a mass in ampullary.  A MRI/MRCP of abdomen was done.\par   \par 2022, CT-Scan of Abdomen/Pelvis:\par Diffuse intrahepatic and extra hepatic biliary ductal dilatation with common bile duct measuring 1.3 cm with abrupt narrowing near the ampulla. Biliary obstruction possibly secondary to ampullary mass, biliary stricture or small stone with  no focal hypoenhancing pancreatic lesion or dilatation of the main pancreatic duct. Further evaluation can be obtained with MRCP with IV contrast.\par  \par 2022, Noncontrast abdominal MRI/MRCP:\par Biliary duct dilation with abrupt cut off near the ampulla, where there is an associated 2.4 cm periampullary lesion. Mild prominence of the pancreatic duct to 4 mm in the head. 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 Further evaluation with upper endoscopy/ERCP is recommended. Distended gallbladder with sludge. Mild nonspecific edematous gallbladder wall thickening. Right adnexal cyst measuring 1.7 cm.\par \par 2022, Biopsy of Duodenal was performed: Pathology report revealed: Duodenum, biopsy-Duodenal adenoma with high-grade dysplasia, fragments, see note. Note: Given the worrisome clinical impression of a "mass" the biopsy may not be entirely representative and possibility of an unsampled adenocarcinoma cannot be excluded with certainty. Correlation with endoscopic/radiographic impression is imperative. It there is ongoing concern, a repeat deeper biopsy may be helpful of clinically indicated.\par  \par 2022, CT-Chest:\par Subcentimeter left upper lobe groundglass nodule, likely not metastasis. Indeterminate sclerosis of the anterior aspect of the left fifth rib, possibly posttraumatic.\par \par Patient is a . She lives with one son. She had 3 children.  One son is  at age 48 years old post choking on food; 2nd adult son has Fragile X syndrome who is  mentally disable and Oldest son has Liver Cirrhosis due to HBV. Patient has never smoked and she does not drink alcohol. 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 \par \par \par  [de-identified] : S/p 15/15 Radiation Therapy. She feels okay. Denies any pain, nausea or vomiting. Appetite is okay. BM's are okay. Denies any melena or bloody stools.

## 2022-11-25 NOTE — ASSESSMENT
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 Patient has tolerated procedure without difficulties. Patient will have repeat imaging in 2 months to assess for progression vs regression of disease. Patient will follow up with us in 2 months for possible next steps. She had the below discussion with Dr. Sorenson, prior to receiving radiation:\par A discussion took place with the patient and her son regarding further management.  The patient has undergone 2 endoscopic ultrasound biopsies of the ampullary mass which have been benign and the last one indicates a tubular adenoma was biopsied.  He was again explained that although the results are good there is still concern that there may be an underlying malignant tumor in place which was missed by the biopsies.  The patient has undergone PET/CT which shows uptake with SUV of 9 in the ampullary area with no evidence of metastases indicating localized lesion.  We did recommend that she consider surgical consultation and she will schedule an appointment with Dr. Machuca for evaluation regarding surgical resection.  Otherwise she will undergo follow-up in the next 2 to 3 months and will be seen by GI for possible repeat biopsy.  He was explained that this time we would not recommend chemotherapy or radiation therapy unless there was a definitive diagnosis of malignancy made.  Overall the patient feels well and has no symptoms at this time and has a very good performance status and quality of life.  Once we have more information further recommendations will be made.

## 2022-11-28 LAB
ALBUMIN SERPL ELPH-MCNC: 4 G/DL
ALP BLD-CCNC: 283 U/L
ALT SERPL-CCNC: 44 U/L
ANION GAP SERPL CALC-SCNC: 10 MMOL/L
AST SERPL-CCNC: 37 U/L
BILIRUB SERPL-MCNC: 0.3 MG/DL
BUN SERPL-MCNC: 32 MG/DL
CALCIUM SERPL-MCNC: 10 MG/DL
CANCER AG19-9 SERPL-ACNC: 13 U/ML
CEA SERPL-MCNC: 1 NG/ML
CHLORIDE SERPL-SCNC: 103 MMOL/L
CO2 SERPL-SCNC: 26 MMOL/L
CREAT SERPL-MCNC: 1.31 MG/DL
EGFR: 39 ML/MIN/1.73M2
GLUCOSE SERPL-MCNC: 105 MG/DL
POTASSIUM SERPL-SCNC: 4.4 MMOL/L
PROT SERPL-MCNC: 6.8 G/DL
SODIUM SERPL-SCNC: 139 MMOL/L

## 2022-12-02 NOTE — HISTORY OF PRESENT ILLNESS
[FreeTextEntry1] : 89 y/o F with poor functional status and a suspicious ampullary tumor.  The patient had undergone 2 endoscopic ultrasound biopsies of the ampullary mass which have been benign and the last one indicates a tubular adenoma was biopsied.  She declined surgical resection.  We decided to proceed with treatment based on radiographic and clinical suspicion in the absence of definitive pathology. This was  communicated with the patient and her family and they are still in agreement with the plan. \par \par 11/1/2022:  Patient is seen for OTV s/p 1/15 fractions to pancreas\par \par 11/07/22: Patient present today for OTV s/p 3/15 fractions to the pancreas. Patient is doing well. Denies any constipation, pain or changes in appetite. Patient states occasional diarrhea and takes Imodium when needed.  \par \par 10/15/2022:  Patient is seen for OTV s/p 9/15 fractions to pancreas and in the interim, patient had R trigger thumb release with Ortho.  She feels weak and has poor appetite.  She tries to drink Ensure x 2 cans a day as supplement but has lost a couple of pounds since last week.  She states GI Dr Gentile wants to repeat endoscopy to check up on her mass/stent but she would like to defer this as her son is having back surgery on 12/2/2022.  \par \par 11/22/2022:  Patient is seen for OTV s/p 15/15 fractions to pancreas and feels well overall.  She continues to have grade 1 fatigue but able to function well and has maintained her weight.

## 2022-12-02 NOTE — REVIEW OF SYSTEMS
[Constipation: Grade 0] : Constipation: Grade 0 [Diarrhea: Grade 1 - Increase of <4 stools per day over baseline; mild increase in ostomy output compared to baseline] : Diarrhea: Grade 1 - Increase of <4 stools per day over baseline; mild increase in ostomy output compared to baseline [Dyspepsia: Grade 0] : Dyspepsia: Grade 0 [Dysphagia: Grade 0] : Dysphagia: Grade 0 [Esophagitis: Grade 0] : Esophagitis: Grade 0 [Nausea: Grade 0] : Nausea: Grade 0 [Vomiting: Grade 0] : Vomiting: Grade 0 [Fatigue: Grade 1 - Fatigue relieved by rest] : Fatigue: Grade 1 - Fatigue relieved by rest [Dyspnea: Grade 0] : Dyspnea: Grade 0 [Hypoxia: Grade 0] : Hypoxia: Grade 0 [Dermatitis Radiation: Grade 0] : Dermatitis Radiation: Grade 0 [FreeTextEntry3] : pt takes miralax uses imodium when needed

## 2022-12-02 NOTE — DISEASE MANAGEMENT
[Pathological] : TNM Stage: p [N/A] : Currently not applicable [TTNM] : x [NTNM] : x [MTNM] : x [de-identified] : 1919cGy [de-identified] : 4200 cGy [de-identified] : Pancreas

## 2022-12-02 NOTE — ADDENDUM
[FreeTextEntry1] : I reviewed above and agree.  Patient doing well.  Will see in fup.\par [facility]\par

## 2022-12-02 NOTE — DISEASE MANAGEMENT
[Pathological] : TNM Stage: p [N/A] : Currently not applicable [TTNM] : x [NTNM] : x [MTNM] : x [de-identified] : 2520 cGy [de-identified] : 4200 cGy [de-identified] : Pancreas

## 2022-12-02 NOTE — HISTORY OF PRESENT ILLNESS
[FreeTextEntry1] : 89 y/o F with poor functional status and a suspicious ampullary tumor.  The patient had undergone 2 endoscopic ultrasound biopsies of the ampullary mass which have been benign and the last one indicates a tubular adenoma was biopsied.  She declined surgical resection.  We decided to proceed with treatment based on radiographic and clinical suspicion in the absence of definitive pathology. This was  communicated with the patient and her family and they are still in agreement with the plan. \par \par 11/1/2022:  Patient is seen for OTV s/p 1/15 fractions to pancreas\par \par 11/07/22: Patient present today for OTV s/p 3/15 fractions to the pancreas. Patient is doing well. Denies any constipation, pain or changes in appetite. Patient states occasional diarrhea and takes Imodium when needed.  \par \par 10/15/2022:  Patient is seen for OTV s/p 9/15 fractions to pancreas and in the interim, patient had R trigger thumb release with Ortho.  She feels weak and has poor appetite.  She tries to drink Ensure x 2 cans a day as supplement but has lost a couple of pounds since last week.  She states GI Dr Gentile wants to repeat endoscopy to check up on her mass/stent but she would like to defer this as her son is having back surgery on 12/2/2022.

## 2022-12-05 ENCOUNTER — NON-APPOINTMENT (OUTPATIENT)
Age: 87
End: 2022-12-05

## 2022-12-14 ENCOUNTER — RX RENEWAL (OUTPATIENT)
Age: 87
End: 2022-12-14

## 2022-12-27 ENCOUNTER — APPOINTMENT (OUTPATIENT)
Dept: RADIATION ONCOLOGY | Facility: CLINIC | Age: 87
End: 2022-12-27
Payer: MEDICARE

## 2022-12-27 VITALS
HEIGHT: 63 IN | TEMPERATURE: 97.7 F | DIASTOLIC BLOOD PRESSURE: 83 MMHG | HEART RATE: 84 BPM | SYSTOLIC BLOOD PRESSURE: 152 MMHG | WEIGHT: 145 LBS | BODY MASS INDEX: 25.69 KG/M2 | RESPIRATION RATE: 17 BRPM | OXYGEN SATURATION: 97 %

## 2022-12-27 PROCEDURE — 99024 POSTOP FOLLOW-UP VISIT: CPT

## 2022-12-27 NOTE — VITALS
[Pain Description/Quality: ___] : Pain description/quality: [unfilled] [ECOG Performance Status: 2 - Ambulatory and capable of all self care but unable to carry out any work activities] : Performance Status: 2 - Ambulatory and capable of all self care but unable to carry out any work activities. Up and about more than 50% of waking hours [Maximal Pain Intensity: 0/10] : 0/10 [Least Pain Intensity: 0/10] : 0/10 [80: Normal activity with effort; some signs or symptoms of disease.] : 80: Normal activity with effort; some signs or symptoms of disease.  [80: Active, but tires more quickly] : 80: Active, but tires more quickly

## 2022-12-31 NOTE — HISTORY OF PRESENT ILLNESS
[FreeTextEntry1] : 89 y/o F with poor functional status and a suspicious ampullary tumor.  The patient had undergone 2 endoscopic ultrasound biopsies of the ampullary mass which have been benign and the last one indicates a tubular adenoma was biopsied.  She declined surgical resection.  We decided to proceed with treatment based on radiographic and clinical suspicion in the absence of definitive pathology. This was  communicated with the patient and her family and they are still in agreement with the plan. \par \par She received IMRT of 4200 cGy in 15 fractions to pancreas from 11/2-11/22/2022.  \par \par 12/27/2022:  Patient is seen for PTE today and feels well overall without any new complaints.  She is eating well and her weight is stable.  She continues to have diarrhea alternating with constipation but is grade 1 and manageable.  She continues to work and remains fully functional.

## 2022-12-31 NOTE — REVIEW OF SYSTEMS
[Diarrhea: Grade 1 - Increase of <4 stools per day over baseline; mild increase in ostomy output compared to baseline] : Diarrhea: Grade 1 - Increase of <4 stools per day over baseline; mild increase in ostomy output compared to baseline [Dyspepsia: Grade 0] : Dyspepsia: Grade 0 [Dysphagia: Grade 0] : Dysphagia: Grade 0 [Esophagitis: Grade 0] : Esophagitis: Grade 0 [Nausea: Grade 0] : Nausea: Grade 0 [Vomiting: Grade 0] : Vomiting: Grade 0 [Dyspnea: Grade 0] : Dyspnea: Grade 0 [Hypoxia: Grade 0] : Hypoxia: Grade 0 [Dermatitis Radiation: Grade 0] : Dermatitis Radiation: Grade 0 [Constipation: Grade 1 - Occasional or intermittent symptoms; occasional use of stool softeners, laxatives, dietary modification, or enema] : Constipation: Grade 1 - Occasional or intermittent symptoms; occasional use of stool softeners, laxatives, dietary modification, or enema [Fatigue: Grade 0] : Fatigue: Grade 0 [FreeTextEntry3] : pt takes miralax uses Imodium when needed

## 2023-01-01 ENCOUNTER — APPOINTMENT (OUTPATIENT)
Dept: ORTHOPEDIC SURGERY | Facility: CLINIC | Age: 88
End: 2023-01-01
Payer: MEDICARE

## 2023-01-01 ENCOUNTER — RX RENEWAL (OUTPATIENT)
Age: 88
End: 2023-01-01

## 2023-01-01 ENCOUNTER — APPOINTMENT (OUTPATIENT)
Dept: PSYCHIATRY | Facility: CLINIC | Age: 88
End: 2023-01-01

## 2023-01-01 ENCOUNTER — RX CHANGE (OUTPATIENT)
Age: 88
End: 2023-01-01

## 2023-01-01 ENCOUNTER — OUTPATIENT (OUTPATIENT)
Dept: OUTPATIENT SERVICES | Facility: HOSPITAL | Age: 88
LOS: 1 days | End: 2023-01-01
Payer: MEDICARE

## 2023-01-01 ENCOUNTER — APPOINTMENT (OUTPATIENT)
Dept: RADIOLOGY | Facility: IMAGING CENTER | Age: 88
End: 2023-01-01
Payer: MEDICARE

## 2023-01-01 ENCOUNTER — RESULT REVIEW (OUTPATIENT)
Age: 88
End: 2023-01-01

## 2023-01-01 ENCOUNTER — APPOINTMENT (OUTPATIENT)
Dept: ORTHOPEDIC SURGERY | Facility: CLINIC | Age: 88
End: 2023-01-01

## 2023-01-01 ENCOUNTER — APPOINTMENT (OUTPATIENT)
Dept: INTERNAL MEDICINE | Facility: CLINIC | Age: 88
End: 2023-01-01

## 2023-01-01 ENCOUNTER — NON-APPOINTMENT (OUTPATIENT)
Age: 88
End: 2023-01-01

## 2023-01-01 ENCOUNTER — APPOINTMENT (OUTPATIENT)
Dept: NUCLEAR MEDICINE | Facility: IMAGING CENTER | Age: 88
End: 2023-01-01
Payer: MEDICARE

## 2023-01-01 ENCOUNTER — APPOINTMENT (OUTPATIENT)
Dept: HEMATOLOGY ONCOLOGY | Facility: CLINIC | Age: 88
End: 2023-01-01
Payer: MEDICARE

## 2023-01-01 ENCOUNTER — APPOINTMENT (OUTPATIENT)
Dept: MRI IMAGING | Facility: IMAGING CENTER | Age: 88
End: 2023-01-01
Payer: MEDICARE

## 2023-01-01 ENCOUNTER — OUTPATIENT (OUTPATIENT)
Dept: OUTPATIENT SERVICES | Facility: HOSPITAL | Age: 88
LOS: 1 days | Discharge: HOME | End: 2023-01-01

## 2023-01-01 ENCOUNTER — APPOINTMENT (OUTPATIENT)
Dept: GASTROENTEROLOGY | Facility: CLINIC | Age: 88
End: 2023-01-01
Payer: MEDICARE

## 2023-01-01 ENCOUNTER — APPOINTMENT (OUTPATIENT)
Dept: INTERNAL MEDICINE | Facility: CLINIC | Age: 88
End: 2023-01-01
Payer: MEDICARE

## 2023-01-01 ENCOUNTER — APPOINTMENT (OUTPATIENT)
Dept: NEUROSURGERY | Facility: CLINIC | Age: 88
End: 2023-01-01
Payer: MEDICARE

## 2023-01-01 VITALS
TEMPERATURE: 97.7 F | DIASTOLIC BLOOD PRESSURE: 70 MMHG | WEIGHT: 144 LBS | HEIGHT: 63 IN | BODY MASS INDEX: 25.52 KG/M2 | SYSTOLIC BLOOD PRESSURE: 120 MMHG

## 2023-01-01 VITALS
SYSTOLIC BLOOD PRESSURE: 120 MMHG | WEIGHT: 129 LBS | OXYGEN SATURATION: 97 % | HEIGHT: 63 IN | TEMPERATURE: 97.1 F | BODY MASS INDEX: 22.86 KG/M2 | HEART RATE: 61 BPM | DIASTOLIC BLOOD PRESSURE: 62 MMHG

## 2023-01-01 VITALS
HEART RATE: 72 BPM | HEIGHT: 63 IN | SYSTOLIC BLOOD PRESSURE: 125 MMHG | WEIGHT: 129 LBS | OXYGEN SATURATION: 98 % | TEMPERATURE: 97.6 F | DIASTOLIC BLOOD PRESSURE: 75 MMHG | BODY MASS INDEX: 22.86 KG/M2

## 2023-01-01 VITALS
DIASTOLIC BLOOD PRESSURE: 83 MMHG | HEART RATE: 59 BPM | BODY MASS INDEX: 25.34 KG/M2 | WEIGHT: 143 LBS | TEMPERATURE: 98.4 F | SYSTOLIC BLOOD PRESSURE: 156 MMHG | OXYGEN SATURATION: 95 % | HEIGHT: 63 IN

## 2023-01-01 VITALS — HEIGHT: 63 IN | HEART RATE: 82 BPM | BODY MASS INDEX: 25.69 KG/M2 | WEIGHT: 145 LBS | OXYGEN SATURATION: 95 %

## 2023-01-01 VITALS
HEART RATE: 54 BPM | WEIGHT: 131 LBS | BODY MASS INDEX: 23.21 KG/M2 | DIASTOLIC BLOOD PRESSURE: 70 MMHG | TEMPERATURE: 98 F | HEIGHT: 63 IN | SYSTOLIC BLOOD PRESSURE: 149 MMHG | OXYGEN SATURATION: 95 %

## 2023-01-01 VITALS
DIASTOLIC BLOOD PRESSURE: 74 MMHG | RESPIRATION RATE: 16 BRPM | OXYGEN SATURATION: 97 % | SYSTOLIC BLOOD PRESSURE: 129 MMHG | WEIGHT: 144 LBS | HEART RATE: 56 BPM | TEMPERATURE: 97.3 F | BODY MASS INDEX: 25.51 KG/M2

## 2023-01-01 VITALS — DIASTOLIC BLOOD PRESSURE: 72 MMHG | SYSTOLIC BLOOD PRESSURE: 124 MMHG

## 2023-01-01 VITALS
HEIGHT: 63 IN | DIASTOLIC BLOOD PRESSURE: 70 MMHG | BODY MASS INDEX: 25.69 KG/M2 | SYSTOLIC BLOOD PRESSURE: 120 MMHG | HEART RATE: 82 BPM | OXYGEN SATURATION: 96 % | WEIGHT: 145 LBS

## 2023-01-01 VITALS
HEIGHT: 63 IN | SYSTOLIC BLOOD PRESSURE: 110 MMHG | BODY MASS INDEX: 23.04 KG/M2 | OXYGEN SATURATION: 92 % | DIASTOLIC BLOOD PRESSURE: 66 MMHG | HEART RATE: 53 BPM | TEMPERATURE: 97.8 F | WEIGHT: 130 LBS

## 2023-01-01 VITALS
TEMPERATURE: 97.6 F | HEART RATE: 79 BPM | DIASTOLIC BLOOD PRESSURE: 79 MMHG | SYSTOLIC BLOOD PRESSURE: 155 MMHG | HEIGHT: 63 IN | BODY MASS INDEX: 25.34 KG/M2 | WEIGHT: 143 LBS | OXYGEN SATURATION: 93 %

## 2023-01-01 VITALS
BODY MASS INDEX: 25.52 KG/M2 | WEIGHT: 144 LBS | SYSTOLIC BLOOD PRESSURE: 138 MMHG | DIASTOLIC BLOOD PRESSURE: 79 MMHG | OXYGEN SATURATION: 97 % | HEIGHT: 63 IN | HEART RATE: 62 BPM

## 2023-01-01 VITALS
DIASTOLIC BLOOD PRESSURE: 89 MMHG | HEIGHT: 63 IN | WEIGHT: 129 LBS | SYSTOLIC BLOOD PRESSURE: 152 MMHG | BODY MASS INDEX: 22.86 KG/M2

## 2023-01-01 VITALS — WEIGHT: 145 LBS | BODY MASS INDEX: 25.69 KG/M2 | HEIGHT: 63 IN

## 2023-01-01 DIAGNOSIS — D13.5 BENIGN NEOPLASM OF EXTRAHEPATIC BILE DUCTS: ICD-10-CM

## 2023-01-01 DIAGNOSIS — R19.4 CHANGE IN BOWEL HABIT: ICD-10-CM

## 2023-01-01 DIAGNOSIS — Z00.8 ENCOUNTER FOR OTHER GENERAL EXAMINATION: ICD-10-CM

## 2023-01-01 DIAGNOSIS — R22.32 LOCALIZED SWELLING, MASS AND LUMP, LEFT UPPER LIMB: ICD-10-CM

## 2023-01-01 DIAGNOSIS — M48.02 SPINAL STENOSIS, CERVICAL REGION: ICD-10-CM

## 2023-01-01 DIAGNOSIS — M25.512 PAIN IN RIGHT SHOULDER: ICD-10-CM

## 2023-01-01 DIAGNOSIS — Z98.891 HISTORY OF UTERINE SCAR FROM PREVIOUS SURGERY: Chronic | ICD-10-CM

## 2023-01-01 DIAGNOSIS — D32.1 BENIGN NEOPLASM OF SPINAL MENINGES: ICD-10-CM

## 2023-01-01 DIAGNOSIS — R00.2 PALPITATIONS: ICD-10-CM

## 2023-01-01 DIAGNOSIS — G47.00 INSOMNIA, UNSPECIFIED: ICD-10-CM

## 2023-01-01 DIAGNOSIS — M25.511 PAIN IN RIGHT SHOULDER: ICD-10-CM

## 2023-01-01 DIAGNOSIS — R63.4 ABNORMAL WEIGHT LOSS: ICD-10-CM

## 2023-01-01 DIAGNOSIS — M54.9 DORSALGIA, UNSPECIFIED: ICD-10-CM

## 2023-01-01 DIAGNOSIS — K21.9 GASTRO-ESOPHAGEAL REFLUX DISEASE W/OUT ESOPHAGITIS: ICD-10-CM

## 2023-01-01 DIAGNOSIS — M65.341 TRIGGER FINGER, RIGHT RING FINGER: ICD-10-CM

## 2023-01-01 DIAGNOSIS — M67.40 GANGLION, UNSPECIFIED SITE: ICD-10-CM

## 2023-01-01 DIAGNOSIS — G62.9 POLYNEUROPATHY, UNSPECIFIED: ICD-10-CM

## 2023-01-01 DIAGNOSIS — I10 ESSENTIAL (PRIMARY) HYPERTENSION: ICD-10-CM

## 2023-01-01 DIAGNOSIS — F41.9 ANXIETY DISORDER, UNSPECIFIED: ICD-10-CM

## 2023-01-01 DIAGNOSIS — D64.9 ANEMIA, UNSPECIFIED: ICD-10-CM

## 2023-01-01 DIAGNOSIS — K86.89 OTHER SPECIFIED DISEASES OF PANCREAS: ICD-10-CM

## 2023-01-01 DIAGNOSIS — E78.5 HYPERLIPIDEMIA, UNSPECIFIED: ICD-10-CM

## 2023-01-01 DIAGNOSIS — R22.2 LOCALIZED SWELLING, MASS AND LUMP, TRUNK: ICD-10-CM

## 2023-01-01 DIAGNOSIS — Z00.00 ENCOUNTER FOR GENERAL ADULT MEDICAL EXAMINATION W/OUT ABNORMAL FINDINGS: ICD-10-CM

## 2023-01-01 DIAGNOSIS — M26.601 RIGHT TEMPOROMANDIBULAR JOINT DISORDER, UNSPECIFIED: ICD-10-CM

## 2023-01-01 LAB
ALBUMIN SERPL ELPH-MCNC: 3.9 G/DL
ALBUMIN SERPL ELPH-MCNC: 4 G/DL
ALBUMIN SERPL ELPH-MCNC: 4.2 G/DL
ALP BLD-CCNC: 202 U/L
ALP BLD-CCNC: 311 U/L
ALP BLD-CCNC: 320 U/L
ALT SERPL-CCNC: 19 U/L
ALT SERPL-CCNC: 21 U/L
ALT SERPL-CCNC: 33 U/L
ANION GAP SERPL CALC-SCNC: 11 MMOL/L
ANION GAP SERPL CALC-SCNC: 12 MMOL/L
ANION GAP SERPL CALC-SCNC: 13 MMOL/L
APPEARANCE: ABNORMAL
APPEARANCE: CLEAR
AST SERPL-CCNC: 15 U/L
AST SERPL-CCNC: 16 U/L
AST SERPL-CCNC: 27 U/L
BACTERIA: ABNORMAL /HPF
BACTERIA: NEGATIVE /HPF
BASOPHILS # BLD AUTO: 0.05 K/UL
BASOPHILS NFR BLD AUTO: 0.8 %
BILIRUB SERPL-MCNC: 0.3 MG/DL
BILIRUB SERPL-MCNC: 0.3 MG/DL
BILIRUB SERPL-MCNC: 0.4 MG/DL
BILIRUBIN URINE: NEGATIVE
BILIRUBIN URINE: NEGATIVE
BLOOD URINE: NEGATIVE
BLOOD URINE: NEGATIVE
BUN SERPL-MCNC: 16 MG/DL
BUN SERPL-MCNC: 33 MG/DL
BUN SERPL-MCNC: 33 MG/DL
C DIFF TOX B STL QL CT TISS CULT: NORMAL
CALCIUM SERPL-MCNC: 10.2 MG/DL
CALCIUM SERPL-MCNC: 9.5 MG/DL
CALCIUM SERPL-MCNC: 9.8 MG/DL
CANCER AG19-9 SERPL-ACNC: 29 U/ML
CAST: 1 /LPF
CAST: 1 /LPF
CEA SERPL-MCNC: 1.1 NG/ML
CHLORIDE SERPL-SCNC: 102 MMOL/L
CHLORIDE SERPL-SCNC: 106 MMOL/L
CHLORIDE SERPL-SCNC: 106 MMOL/L
CHOLEST SERPL-MCNC: 143 MG/DL
CHOLEST SERPL-MCNC: 163 MG/DL
CO2 SERPL-SCNC: 23 MMOL/L
CO2 SERPL-SCNC: 24 MMOL/L
CO2 SERPL-SCNC: 26 MMOL/L
COLOR: YELLOW
COLOR: YELLOW
CORE LAB BIOPSY: NORMAL
CREAT SERPL-MCNC: 1.08 MG/DL
CREAT SERPL-MCNC: 1.3 MG/DL
CREAT SERPL-MCNC: 1.6 MG/DL
EGFR: 30 ML/MIN/1.73M2
EGFR: 39 ML/MIN/1.73M2
EGFR: 49 ML/MIN/1.73M2
EOSINOPHIL # BLD AUTO: 0.08 K/UL
EOSINOPHIL NFR BLD AUTO: 1.3 %
EPITHELIAL CELLS: 4 /HPF
EPITHELIAL CELLS: 6 /HPF
ESTIMATED AVERAGE GLUCOSE: 140 MG/DL
ESTIMATED AVERAGE GLUCOSE: 146 MG/DL
FAT STL QN: NORMAL
FAT STL QN: NORMAL
FERRITIN SERPL-MCNC: 48 NG/ML
FOLATE SERPL-MCNC: 19 NG/ML
GLUCOSE QUALITATIVE U: NEGATIVE MG/DL
GLUCOSE QUALITATIVE U: NEGATIVE MG/DL
GLUCOSE SERPL-MCNC: 113 MG/DL
GLUCOSE SERPL-MCNC: 140 MG/DL
GLUCOSE SERPL-MCNC: 161 MG/DL
HBA1C MFR BLD HPLC: 6.5 %
HBA1C MFR BLD HPLC: 6.7 %
HCT VFR BLD CALC: 32 %
HCT VFR BLD CALC: 32.9 %
HDLC SERPL-MCNC: 33 MG/DL
HDLC SERPL-MCNC: 43 MG/DL
HGB BLD-MCNC: 10.5 G/DL
HGB BLD-MCNC: 9.9 G/DL
IMM GRANULOCYTES NFR BLD AUTO: 0.3 %
IRON SATN MFR SERPL: 10 %
IRON SERPL-MCNC: 30 UG/DL
KETONES URINE: NEGATIVE MG/DL
KETONES URINE: NEGATIVE MG/DL
LDLC SERPL CALC-MCNC: 107 MG/DL
LDLC SERPL CALC-MCNC: 78 MG/DL
LEUKOCYTE ESTERASE URINE: ABNORMAL
LEUKOCYTE ESTERASE URINE: ABNORMAL
LYMPHOCYTES # BLD AUTO: 0.85 K/UL
LYMPHOCYTES NFR BLD AUTO: 13.3 %
Lab: NORMAL
MAN DIFF?: NORMAL
MCHC RBC-ENTMCNC: 29.2 PG
MCHC RBC-ENTMCNC: 29.7 PG
MCHC RBC-ENTMCNC: 30.9 GM/DL
MCHC RBC-ENTMCNC: 31.9 GM/DL
MCV RBC AUTO: 91.6 FL
MCV RBC AUTO: 96.1 FL
MICROSCOPIC-UA: NORMAL
MICROSCOPIC-UA: NORMAL
MONOCYTES # BLD AUTO: 0.57 K/UL
MONOCYTES NFR BLD AUTO: 8.9 %
NEUTROPHILS # BLD AUTO: 4.8 K/UL
NEUTROPHILS NFR BLD AUTO: 75.4 %
NITRITE URINE: NEGATIVE
NITRITE URINE: NEGATIVE
NONHDLC SERPL-MCNC: 100 MG/DL
NONHDLC SERPL-MCNC: 130 MG/DL
PH URINE: 5.5
PH URINE: 5.5
PLATELET # BLD AUTO: 212 K/UL
PLATELET # BLD AUTO: 214 K/UL
POTASSIUM SERPL-SCNC: 3.5 MMOL/L
POTASSIUM SERPL-SCNC: 3.9 MMOL/L
POTASSIUM SERPL-SCNC: 4.8 MMOL/L
PROT SERPL-MCNC: 6.7 G/DL
PROT SERPL-MCNC: 6.8 G/DL
PROT SERPL-MCNC: 7.5 G/DL
PROTEIN URINE: 30 MG/DL
PROTEIN URINE: NEGATIVE MG/DL
RBC # BLD: 3.33 M/UL
RBC # BLD: 3.33 M/UL
RBC # BLD: 3.59 M/UL
RBC # FLD: 14.2 %
RBC # FLD: 14.2 %
RED BLOOD CELLS URINE: 1 /HPF
RED BLOOD CELLS URINE: 2 /HPF
RETICS # AUTO: 1.4 %
RETICS AGGREG/RBC NFR: 45 K/UL
SODIUM SERPL-SCNC: 140 MMOL/L
SODIUM SERPL-SCNC: 141 MMOL/L
SODIUM SERPL-SCNC: 142 MMOL/L
SPECIFIC GRAVITY URINE: 1.02
SPECIFIC GRAVITY URINE: 1.02
T4 SERPL-MCNC: 7.6 UG/DL
T4 SERPL-MCNC: 9.3 UG/DL
TIBC SERPL-MCNC: 295 UG/DL
TRIGL SERPL-MCNC: 124 MG/DL
TRIGL SERPL-MCNC: 130 MG/DL
TSH SERPL-ACNC: 0.9 UIU/ML
TSH SERPL-ACNC: 0.93 UIU/ML
UIBC SERPL-MCNC: 265 UG/DL
UROBILINOGEN URINE: 0.2 MG/DL
UROBILINOGEN URINE: 0.2 MG/DL
VIT B12 SERPL-MCNC: 574 PG/ML
WBC # FLD AUTO: 6.07 K/UL
WBC # FLD AUTO: 6.37 K/UL
WHITE BLOOD CELLS URINE: 31 /HPF
WHITE BLOOD CELLS URINE: 8 /HPF

## 2023-01-01 PROCEDURE — 99214 OFFICE O/P EST MOD 30 MIN: CPT | Mod: 25

## 2023-01-01 PROCEDURE — 20550 NJX 1 TENDON SHEATH/LIGAMENT: CPT | Mod: RT

## 2023-01-01 PROCEDURE — 72141 MRI NECK SPINE W/O DYE: CPT | Mod: 26,MH

## 2023-01-01 PROCEDURE — 99212 OFFICE O/P EST SF 10 MIN: CPT

## 2023-01-01 PROCEDURE — 99214 OFFICE O/P EST MOD 30 MIN: CPT

## 2023-01-01 PROCEDURE — 73030 X-RAY EXAM OF SHOULDER: CPT | Mod: 26,50

## 2023-01-01 PROCEDURE — 76881 US COMPL JOINT R-T W/IMG: CPT

## 2023-01-01 PROCEDURE — 78815 PET IMAGE W/CT SKULL-THIGH: CPT | Mod: 26,PS,MH

## 2023-01-01 PROCEDURE — 72157 MRI CHEST SPINE W/O & W/DYE: CPT

## 2023-01-01 PROCEDURE — 26115 EXC HAND LES SC < 1.5 CM: CPT | Mod: F3

## 2023-01-01 PROCEDURE — 99024 POSTOP FOLLOW-UP VISIT: CPT

## 2023-01-01 PROCEDURE — 72141 MRI NECK SPINE W/O DYE: CPT

## 2023-01-01 PROCEDURE — 74183 MRI ABD W/O CNTR FLWD CNTR: CPT

## 2023-01-01 PROCEDURE — 73221 MRI JOINT UPR EXTREM W/O DYE: CPT | Mod: 26,LT,MH

## 2023-01-01 PROCEDURE — 73221 MRI JOINT UPR EXTREM W/O DYE: CPT

## 2023-01-01 PROCEDURE — 20610 DRAIN/INJ JOINT/BURSA W/O US: CPT | Mod: 50

## 2023-01-01 PROCEDURE — A9585: CPT

## 2023-01-01 PROCEDURE — 78815 PET IMAGE W/CT SKULL-THIGH: CPT

## 2023-01-01 PROCEDURE — 73030 X-RAY EXAM OF SHOULDER: CPT | Mod: 50

## 2023-01-01 PROCEDURE — G0439: CPT

## 2023-01-01 PROCEDURE — 72146 MRI CHEST SPINE W/O DYE: CPT | Mod: 26,MH

## 2023-01-01 PROCEDURE — 73030 X-RAY EXAM OF SHOULDER: CPT

## 2023-01-01 PROCEDURE — 74183 MRI ABD W/O CNTR FLWD CNTR: CPT | Mod: 26,MH

## 2023-01-01 PROCEDURE — 72157 MRI CHEST SPINE W/O & W/DYE: CPT | Mod: 26,MH

## 2023-01-01 PROCEDURE — 73221 MRI JOINT UPR EXTREM W/O DYE: CPT | Mod: 26,RT,MH,77

## 2023-01-01 PROCEDURE — 36415 COLL VENOUS BLD VENIPUNCTURE: CPT

## 2023-01-01 PROCEDURE — 26055 INCISE FINGER TENDON SHEATH: CPT | Mod: F8

## 2023-01-01 PROCEDURE — A9552: CPT

## 2023-01-01 PROCEDURE — 72146 MRI CHEST SPINE W/O DYE: CPT

## 2023-01-01 PROCEDURE — 72040 X-RAY EXAM NECK SPINE 2-3 VW: CPT

## 2023-01-01 PROCEDURE — 99203 OFFICE O/P NEW LOW 30 MIN: CPT

## 2023-01-01 PROCEDURE — 99215 OFFICE O/P EST HI 40 MIN: CPT | Mod: 25

## 2023-01-01 RX ORDER — METOPROLOL SUCCINATE 50 MG/1
50 TABLET, EXTENDED RELEASE ORAL
Qty: 90 | Refills: 1 | Status: ACTIVE | COMMUNITY
Start: 2023-01-01 | End: 1900-01-01

## 2023-01-01 RX ORDER — ATORVASTATIN CALCIUM 10 MG/1
10 TABLET, FILM COATED ORAL
Qty: 90 | Refills: 3 | Status: ACTIVE | COMMUNITY
Start: 2020-01-24 | End: 1900-01-01

## 2023-01-01 RX ORDER — MELOXICAM 15 MG/1
15 TABLET ORAL
Qty: 30 | Refills: 11 | Status: ACTIVE | COMMUNITY
Start: 2023-01-01 | End: 1900-01-01

## 2023-01-01 RX ORDER — TOLTERODINE TARTRATE 2 MG/1
2 CAPSULE, EXTENDED RELEASE ORAL
Qty: 180 | Refills: 4 | Status: COMPLETED | COMMUNITY
Start: 2022-08-05 | End: 2023-01-01

## 2023-01-01 RX ORDER — HYDROCHLOROTHIAZIDE 25 MG/1
25 TABLET ORAL
Qty: 90 | Refills: 3 | Status: ACTIVE | COMMUNITY
Start: 2020-09-08 | End: 1900-01-01

## 2023-01-01 RX ORDER — CHOLESTYRAMINE 4 G/9G
4 POWDER, FOR SUSPENSION ORAL
Refills: 0 | Status: COMPLETED | COMMUNITY
Start: 2022-07-27 | End: 2023-01-01

## 2023-01-01 RX ORDER — HYDROXYZINE HYDROCHLORIDE 10 MG/1
10 TABLET ORAL 3 TIMES DAILY
Qty: 90 | Refills: 1 | Status: COMPLETED | COMMUNITY
Start: 2022-08-04 | End: 2023-01-01

## 2023-01-01 RX ORDER — GABAPENTIN 100 MG/1
100 CAPSULE ORAL
Qty: 120 | Refills: 1 | Status: ACTIVE | COMMUNITY
Start: 2023-01-01 | End: 1900-01-01

## 2023-01-01 RX ORDER — LOSARTAN POTASSIUM 100 MG/1
100 TABLET, FILM COATED ORAL
Qty: 90 | Refills: 3 | Status: ACTIVE | COMMUNITY
Start: 2020-09-08 | End: 1900-01-01

## 2023-01-01 RX ORDER — ALPRAZOLAM 0.5 MG/1
0.5 TABLET ORAL AT BEDTIME
Qty: 15 | Refills: 0 | Status: COMPLETED | COMMUNITY
Start: 2023-01-01 | End: 2023-01-01

## 2023-01-01 RX ORDER — METOPROLOL SUCCINATE 50 MG/1
50 TABLET, EXTENDED RELEASE ORAL
Qty: 30 | Refills: 0 | Status: DISCONTINUED | COMMUNITY
Start: 2023-01-16 | End: 2023-01-01

## 2023-01-01 RX ORDER — ALPRAZOLAM 0.25 MG/1
0.25 TABLET ORAL
Qty: 30 | Refills: 0 | Status: ACTIVE | COMMUNITY
Start: 2022-08-24 | End: 1900-01-01

## 2023-01-01 RX ORDER — OMEPRAZOLE 20 MG/1
20 CAPSULE, DELAYED RELEASE ORAL
Qty: 90 | Refills: 3 | Status: ACTIVE | COMMUNITY
Start: 2020-01-18 | End: 1900-01-01

## 2023-01-01 RX ORDER — ZOLPIDEM TARTRATE 10 MG/1
10 TABLET ORAL
Qty: 30 | Refills: 0 | Status: DISCONTINUED | COMMUNITY
Start: 2019-12-06 | End: 2023-01-01

## 2023-01-01 RX ORDER — LORATADINE 5 MG
TABLET,CHEWABLE ORAL
Refills: 0 | Status: COMPLETED | COMMUNITY
End: 2023-01-01

## 2023-01-01 RX ORDER — TRAZODONE HYDROCHLORIDE 50 MG/1
50 TABLET ORAL
Qty: 90 | Refills: 1 | Status: DISCONTINUED | COMMUNITY
Start: 2023-01-01 | End: 2023-01-01

## 2023-01-16 ENCOUNTER — NON-APPOINTMENT (OUTPATIENT)
Age: 88
End: 2023-01-16

## 2023-01-16 ENCOUNTER — APPOINTMENT (OUTPATIENT)
Dept: INTERNAL MEDICINE | Facility: CLINIC | Age: 88
End: 2023-01-16
Payer: MEDICARE

## 2023-01-16 VITALS
HEART RATE: 81 BPM | BODY MASS INDEX: 25.52 KG/M2 | WEIGHT: 144 LBS | SYSTOLIC BLOOD PRESSURE: 120 MMHG | HEIGHT: 63 IN | DIASTOLIC BLOOD PRESSURE: 80 MMHG | TEMPERATURE: 98.1 F | OXYGEN SATURATION: 97 %

## 2023-01-16 DIAGNOSIS — Z86.79 PERSONAL HISTORY OF OTHER DISEASES OF THE CIRCULATORY SYSTEM: ICD-10-CM

## 2023-01-16 PROCEDURE — 93000 ELECTROCARDIOGRAM COMPLETE: CPT

## 2023-01-16 PROCEDURE — 99214 OFFICE O/P EST MOD 30 MIN: CPT | Mod: 25

## 2023-01-16 PROCEDURE — 36415 COLL VENOUS BLD VENIPUNCTURE: CPT

## 2023-01-16 RX ORDER — LIDOCAINE 5% 700 MG/1
5 PATCH TOPICAL
Qty: 30 | Refills: 11 | Status: ACTIVE | COMMUNITY
Start: 2020-03-13 | End: 1900-01-01

## 2023-01-16 NOTE — ASSESSMENT
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  She did undergo 15 treatments of radiation which ended in November and she has been stable since that time and is due to get CAT scan.  Her more pressing problem is palpitations which are fast and regular lasted approximately 10 minutes and are exacerbated by stress and exercise and relieved by sitting down but occur only once every 10 days or so.  She denies using excess amounts of caffeine and she is on no particular medications that would exacerbate the problem.  We are getting full blood screen plus thyroids as well as an EKG.  Her cardiac exam at this time is normal.  EKG shows unchanged left bundle branch block and the question of an old inferior wall MI.  Echocardiogram done in the hospital in the middle of 2022 does not show any significant disorder

## 2023-01-16 NOTE — REVIEW OF SYSTEMS
[Negative] : Heme/Lymph [FreeTextEntry5] : CHPI [FreeTextEntry7] : See HPI [de-identified] : Anxiety and panic

## 2023-01-16 NOTE — PHYSICAL EXAM
[Normal Rate] : normal rate  [Regular Rhythm] : with a regular rhythm [Normal S1, S2] : normal S1 and S2 [No Murmur] : no murmur heard [Normal] : no posterior cervical lymphadenopathy and no anterior cervical lymphadenopathy [de-identified] : Patient is in no acute distress but does have pain in her arms for which she will see pain management.  She is not jaundiced [de-identified] : Conjunctiva are clear

## 2023-01-16 NOTE — HISTORY OF PRESENT ILLNESS
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  Seen biopsies which showed benign disease and adenoma only.  Since the diagnosis of the tumor she has had anorexia with weight loss but without jaundice at the present time and her stools are now normal color as is her urine.  However she continues to have anorexia and she is due to get follow-up CAT scan.  She comes in today because she has noted palpitations which are fast and regular and appropriate 150 bpm especially when she is up and around doing housework.  She sits down and generally resolves within 10 minutes.  She denies associated chest pain swelling fainting dyspnea.  This occurs 1 time every week and half approximately and she attributes it to panic and/or exertion.  The patient feels better after taking half a Xanax and she denies abusing caffeine.  She has no symptoms of hypothyroidism

## 2023-01-17 LAB
ALBUMIN SERPL ELPH-MCNC: 4.3 G/DL
ALP BLD-CCNC: 249 U/L
ALT SERPL-CCNC: 19 U/L
ANION GAP SERPL CALC-SCNC: 11 MMOL/L
AST SERPL-CCNC: 18 U/L
BASOPHILS # BLD AUTO: 0.04 K/UL
BASOPHILS NFR BLD AUTO: 0.6 %
BILIRUB SERPL-MCNC: 0.3 MG/DL
BUN SERPL-MCNC: 33 MG/DL
CALCIUM SERPL-MCNC: 10 MG/DL
CHLORIDE SERPL-SCNC: 104 MMOL/L
CO2 SERPL-SCNC: 27 MMOL/L
CREAT SERPL-MCNC: 1.58 MG/DL
EGFR: 31 ML/MIN/1.73M2
EOSINOPHIL # BLD AUTO: 0.07 K/UL
EOSINOPHIL NFR BLD AUTO: 1.1 %
GLUCOSE SERPL-MCNC: 125 MG/DL
HCT VFR BLD CALC: 33.5 %
HGB BLD-MCNC: 10.6 G/DL
IMM GRANULOCYTES NFR BLD AUTO: 0.3 %
LYMPHOCYTES # BLD AUTO: 0.79 K/UL
LYMPHOCYTES NFR BLD AUTO: 12.7 %
MAN DIFF?: NORMAL
MCHC RBC-ENTMCNC: 28.3 PG
MCHC RBC-ENTMCNC: 31.6 GM/DL
MCV RBC AUTO: 89.6 FL
MONOCYTES # BLD AUTO: 0.5 K/UL
MONOCYTES NFR BLD AUTO: 8.1 %
NEUTROPHILS # BLD AUTO: 4.78 K/UL
NEUTROPHILS NFR BLD AUTO: 77.2 %
PLATELET # BLD AUTO: 203 K/UL
POTASSIUM SERPL-SCNC: 4.4 MMOL/L
PROT SERPL-MCNC: 7.3 G/DL
RBC # BLD: 3.74 M/UL
RBC # FLD: 15.2 %
SODIUM SERPL-SCNC: 141 MMOL/L
T3 SERPL-MCNC: 91 NG/DL
T4 SERPL-MCNC: 8 UG/DL
TSH SERPL-ACNC: 1.69 UIU/ML
WBC # FLD AUTO: 6.2 K/UL

## 2023-01-30 NOTE — ASSESSMENT
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  The patient continues to do well and and is functioning quite active for his stated age.  She will continue medical, GI and GYN follow-up and testing.  She will undergo surgical evaluation and follow-up.  Once we have the results of the PET/CT further recommendations will be made.  The patient will also undergo evaluation for her bilateral upper extremity muscle pain making it difficult for her to carry out range of motion activities.  The symptoms suggest this may be related to myositis.  The patient will also undergo radiation oncology follow-up. [Curative] : Goals of care discussed with patient: Curative [Palliative Care Plan] : not applicable at this time

## 2023-01-30 NOTE — HISTORY OF PRESENT ILLNESS
[Disease: _____________________] : Disease: [unfilled] [de-identified] : 89 yo female with PMHX of Hypertension, Hyperlipidemia comes for evaluation of ampullary mass. Patient history dates back to early 2022. Patient c/o orange urine, pale stools and itchy skin and intermittent RUQ abdominal pain. Patient was sent by PCP who was concern for malignancy. Patient was admitted to [facility] of 2022 for evaluation. She denies any  fevers, chills, or night sweats. She had decrease appetite, but no weight loss. While in the hospital a CT-Scan of Abdomen/Pelvis was done that revealed a mass in ampullary.  A MRI/MRCP of abdomen was done.\par   \par 2022, CT-Scan of Abdomen/Pelvis:\par Diffuse intrahepatic and extra hepatic biliary ductal dilatation with common bile duct measuring 1.3 cm with abrupt narrowing near the ampulla. Biliary obstruction possibly secondary to ampullary mass, biliary stricture or small stone with  no focal hypoenhancing pancreatic lesion or dilatation of the main pancreatic duct. Further evaluation can be obtained with MRCP with IV contrast.\par  \par 2022, Noncontrast abdominal MRI/MRCP:\par Biliary duct dilation with abrupt cut off near the ampulla, where there is an associated 2.4 cm periampullary lesion. Mild prominence of the pancreatic duct to 4 mm in the head. 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 Further evaluation with upper endoscopy/ERCP is recommended. Distended gallbladder with sludge. Mild nonspecific edematous gallbladder wall thickening. Right adnexal cyst measuring 1.7 cm.\par \par 2022, Biopsy of Duodenal was performed: Pathology report revealed: Duodenum, biopsy-Duodenal adenoma with high-grade dysplasia, fragments, see note. Note: Given the worrisome clinical impression of a "mass" the biopsy may not be entirely representative and possibility of an unsampled adenocarcinoma cannot be excluded with certainty. Correlation with endoscopic/radiographic impression is imperative. It there is ongoing concern, a repeat deeper biopsy may be helpful of clinically indicated.\par  \par 2022, CT-Chest:\par Subcentimeter left upper lobe groundglass nodule, likely not metastasis. Indeterminate sclerosis of the anterior aspect of the left fifth rib, possibly posttraumatic.\par \par Patient is a . She lives with one son. She had 3 children.  One son is  at age 48 years old post choking on food; 2nd adult son has Fragile X syndrome who is  mentally disable and Oldest son has Liver Cirrhosis due to HBV. Patient has never smoked and she does not drink alcohol. 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 \par \par \par  [de-identified] : Since the last visit the patient overall remains well but complains of weakness, itchy skin, insomnia and anxiety.  Patient had repeat biopsy of the ampullary  mass which was benign described as a tubular adenoma.\par \par Since the last visit the pt had RT to ampullary tumor and pt tolerated rx well.  Her major complaint is pain and tenderness in the muscles of the upper extremity making it difficult to perform range of motion activities.  She is requesting MRIs to evaluate the bones and muscles and this will be cleared with her primary care physician Dr. Washington.  The symptoms suggest this may be myositis.

## 2023-02-02 PROBLEM — R00.2 PALPITATIONS: Status: ACTIVE | Noted: 2023-01-16

## 2023-02-02 NOTE — PHYSICAL EXAM
[Normal] : no posterior cervical lymphadenopathy and no anterior cervical lymphadenopathy [de-identified] : The patient has pain on range of motion especially of the left shoulder without crepitus [de-identified] : Reflexes are symmetrically decreased at the elbows but 2+ 2+ at the wrist and sensory is intact.  There is good strength throughout except for handgrip on the left

## 2023-02-02 NOTE — ASSESSMENT
[FreeTextEntry1] : The patient's palpitations have resolved on atenolol and at this point no further work-up will be done.  Cardiac exam is normal as is her rhythm.  The pain in her shoulders left greater than right and we are checking her CPK as well as her alk phos.  She will continue on the statin as stopping it has not made any difference.  The symptoms do not appear to be consistent with polymyalgia rheumatica but we are checking CBC and sed rate.  I have given her a consultation with orthopedics

## 2023-02-02 NOTE — PHYSICAL EXAM
[Normal] : no posterior cervical lymphadenopathy and no anterior cervical lymphadenopathy [de-identified] : The patient has pain on range of motion especially of the left shoulder without crepitus [de-identified] : Reflexes are symmetrically decreased at the elbows but 2+ 2+ at the wrist and sensory is intact.  There is good strength throughout except for handgrip on the left

## 2023-02-03 NOTE — REVIEW OF SYSTEMS
[Negative] : Heme/Lymph [FreeTextEntry9] : has back pains [FreeTextEntry7] : on pepcid for heartburn [de-identified] : has inc neuropathy in finger tips and feet going up legs, hs difficulty walking and dropping objects.

## 2023-02-03 NOTE — HISTORY OF PRESENT ILLNESS
[Disease: _____________________] : Disease: [unfilled] [de-identified] : 88 yo female with PMHX of Hypertension, Hyperlipidemia comes for evaluation of ampullary mass. Patient history dates back to early 2022. Patient c/o orange urine, pale stools and itchy skin and intermittent RUQ abdominal pain. Patient was sent by PCP who was concern for malignancy. Patient was admitted to [facility] of 2022 for evaluation. She denies any  fevers, chills, or night sweats. She had decrease appetite, but no weight loss. While in the hospital a CT-Scan of Abdomen/Pelvis was done that revealed a mass in ampullary.  A MRI/MRCP of abdomen was done.\par   \par 2022, CT-Scan of Abdomen/Pelvis:\par Diffuse intrahepatic and extra hepatic biliary ductal dilatation with common bile duct measuring 1.3 cm with abrupt narrowing near the ampulla. Biliary obstruction possibly secondary to ampullary mass, biliary stricture or small stone with  no focal hypoenhancing pancreatic lesion or dilatation of the main pancreatic duct. Further evaluation can be obtained with MRCP with IV contrast.\par  \par 2022, Noncontrast abdominal MRI/MRCP:\par Biliary duct dilation with abrupt cut off near the ampulla, where there is an associated 2.4 cm periampullary lesion. Mild prominence of the pancreatic duct to 4 mm in the head. 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 Further evaluation with upper endoscopy/ERCP is recommended. Distended gallbladder with sludge. Mild nonspecific edematous gallbladder wall thickening. Right adnexal cyst measuring 1.7 cm.\par \par 2022, Biopsy of Duodenal was performed: Pathology report revealed: Duodenum, biopsy-Duodenal adenoma with high-grade dysplasia, fragments, see note. Note: Given the worrisome clinical impression of a "mass" the biopsy may not be entirely representative and possibility of an unsampled adenocarcinoma cannot be excluded with certainty. Correlation with endoscopic/radiographic impression is imperative. It there is ongoing concern, a repeat deeper biopsy may be helpful of clinically indicated.\par  \par 2022, CT-Chest:\par Subcentimeter left upper lobe groundglass nodule, likely not metastasis. Indeterminate sclerosis of the anterior aspect of the left fifth rib, possibly posttraumatic.\par \par Patient is a . She lives with one son. She had 3 children.  One son is  at age 48 years old post choking on food; 2nd adult son has Fragile X syndrome who is  mentally disable and Oldest son has Liver Cirrhosis due to HBV. Patient has never smoked and she does not drink alcohol. 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 \par \par \par  [de-identified] : Since the ast visit thew pt remains well

## 2023-02-03 NOTE — REVIEW OF SYSTEMS
[Negative] : Heme/Lymph [FreeTextEntry9] : has back pains [FreeTextEntry7] : on pepcid for heartburn [de-identified] : has inc neuropathy in finger tips and feet going up legs, hs difficulty walking and dropping objects.

## 2023-02-03 NOTE — HISTORY OF PRESENT ILLNESS
[Disease: _____________________] : Disease: [unfilled] [de-identified] : 89 yo female with PMHX of Hypertension, Hyperlipidemia comes for evaluation of ampullary mass. Patient history dates back to early 2022. Patient c/o orange urine, pale stools and itchy skin and intermittent RUQ abdominal pain. Patient was sent by PCP who was concern for malignancy. Patient was admitted to [facility] of 2022 for evaluation. She denies any  fevers, chills, or night sweats. She had decrease appetite, but no weight loss. While in the hospital a CT-Scan of Abdomen/Pelvis was done that revealed a mass in ampullary.  A MRI/MRCP of abdomen was done.\par   \par 2022, CT-Scan of Abdomen/Pelvis:\par Diffuse intrahepatic and extra hepatic biliary ductal dilatation with common bile duct measuring 1.3 cm with abrupt narrowing near the ampulla. Biliary obstruction possibly secondary to ampullary mass, biliary stricture or small stone with  no focal hypoenhancing pancreatic lesion or dilatation of the main pancreatic duct. Further evaluation can be obtained with MRCP with IV contrast.\par  \par 2022, Noncontrast abdominal MRI/MRCP:\par Biliary duct dilation with abrupt cut off near the ampulla, where there is an associated 2.4 cm periampullary lesion. Mild prominence of the pancreatic duct to 4 mm in the head. 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 Further evaluation with upper endoscopy/ERCP is recommended. Distended gallbladder with sludge. Mild nonspecific edematous gallbladder wall thickening. Right adnexal cyst measuring 1.7 cm.\par \par 2022, Biopsy of Duodenal was performed: Pathology report revealed: Duodenum, biopsy-Duodenal adenoma with high-grade dysplasia, fragments, see note. Note: Given the worrisome clinical impression of a "mass" the biopsy may not be entirely representative and possibility of an unsampled adenocarcinoma cannot be excluded with certainty. Correlation with endoscopic/radiographic impression is imperative. It there is ongoing concern, a repeat deeper biopsy may be helpful of clinically indicated.\par  \par 2022, CT-Chest:\par Subcentimeter left upper lobe groundglass nodule, likely not metastasis. Indeterminate sclerosis of the anterior aspect of the left fifth rib, possibly posttraumatic.\par \par Patient is a . She lives with one son. She had 3 children.  One son is  at age 48 years old post choking on food; 2nd adult son has Fragile X syndrome who is  mentally disable and Oldest son has Liver Cirrhosis due to HBV. Patient has never smoked and she does not drink alcohol. 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 \par \par \par  [de-identified] : Since the ast visit thew pt remains well

## 2023-02-17 PROBLEM — M25.511 SHOULDER PAIN, BILATERAL: Status: ACTIVE | Noted: 2023-01-01

## 2023-03-27 NOTE — PHYSICAL EXAM
[de-identified] : Cervical Physical Exam\par \par Gait - Normal; unable to perform tandem gait (no use of cane or any assisted devices)\par \par Station - Normal\par \par Sagittal balance - Normal\par \par Compensatory mechanism? - None\par \par Horizontal gaze - Maintained\par \par Reflexes\par Biceps - Normal\par Triceps - Normal\par Brachioradialis - Normal\par Patellar - Normal\par Gastroc - Normal\par Clonus -No\par \par Peters´s - None\par \par Shoulder Exam - Pain with forward elevation in the scapular plane\par Pain with external rotation\par Pain with supraspinatus testing \par \par Spurling´s - None\par \par Wrist Pulses -2+ radial/ulnar\par \par Foot Pulses -2+ DP/PT\par \par Cervical range of motion - Normal\par \par Sensation\par C5-T1 sensation intact to light touch bilaterally\par \par L1-S1 sensation intact to light touch bilaterally\par \par Motor\par \par 	Deltoid	Biceps	Triceps	WF	WE	IO	\par Right	5/5	5/5	5/5	5/5	5/5	5/5	5/5\par Left	5/5	5/5	5/5	5/5	5/5	5/5	5/5\par \par \par 	IP	Quad	HS	TA	Gastroc	EHL\par Right	5/5	5/5	5/5	5/5	5/5	5/5\par Left	5/5	5/5	5/5	5/5	5/5	5/5  [de-identified] : [facility] - MRI\par 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

## 2023-03-27 NOTE — HISTORY OF PRESENT ILLNESS
[de-identified] : 89 year old female who presents for initial evaluation of her neck pain and radicular sxs down her b/l upper extremities. Patient reports onset of sxs after undergoing 15 sessions of radiation that require her to lift her arms up above her head for long periods of time. Patient reports minor issues with balance and falls but reports that is due to old age and has been going on for about 2-3 years. Patient has been utilizing compression sleeves for her pain sxs with mild relief. Patient reports difficulty sleeping on her LT side due to the pain. \par

## 2023-03-27 NOTE — ADDENDUM
[FreeTextEntry1] : I, Luz Luong, acted solely as a scribe for Dr. Franklyn Whitten MD on this date 03/27/2023  \par \par All medical record entries made by the Scribe were at my, Dr. Franklyn Whitten MD., direction and personally dictated by me on 03/27/2023 . I have reviewed the chart and agree that the record accurately reflects my personal performance of the history, physical exam, assessment and plan. I have also personally directed, reviewed, and agreed with the chart.

## 2023-03-27 NOTE — ASSESSMENT
[FreeTextEntry1] : I had a lengthy discussion with the patient in regards to their treatment plan and diagnosis. Their symptoms have persisted despite the conservative management they have attempted thus far.  As a result I would like to proceed with a thoracic MRI.  In tandem with this they should begin a physical therapy/home therapy program. A referral was provided for [facility] in order to have her managed by a pain management specialist. The patient can take Tylenol/NSAIDs as needed for pain control if medically able to. I instructed the patient to follow-up with . I will have the patient follow-up in 2-3 weeks for repeat clinical evaluation.  I encouraged them to follow-up sooner if their symptoms worsen or change in any way. \par Conservative Treatment Statement\par The patient has tried the following treatments:\par Activity modification         	+\par Ice/Compression                    +\par Braces                                     -\par NSAIDS                                  +\par Physical Therapy                    +\par \par Please note the above modalities have been tried for 6+ weeks over the past 2 months.

## 2023-03-30 NOTE — HISTORY OF PRESENT ILLNESS
[de-identified] : SONJA WOOD  is a 89 year old F who presents for follow up visit with bilateral arm pain and neck pain. Patient is here today to review the MRI of c-spine. Reports no changes since the last visit on 2/27/23. Pain is radiates from the lateral shoulders radiating down the arms. She does not recall a specific inciting incident, but states the pain become much worse over the past 3 months as she had to sit with her arms elevated on a table for 15 sessions of radiation treatment.  \par Pain is described as throbbing in nature, worse with lifting and reaching across her torso. Patient states she has tried to do physical therapy for this issue in the past, but it was too painful. On 2/13/23 she had a right subacromial injection with no relief.\par S/p left rotator cuff repair 17 years ago.\par Denies weakness of the upper extremities. Notes numbness and tingling in the bilateral hands.

## 2023-03-30 NOTE — ADDENDUM
[FreeTextEntry1] : I Ami Méndez, ANGI, assisted in the history and documentation of the patient with Dr Roxie Boss on 03/27/2023

## 2023-03-30 NOTE — PHYSICAL EXAM
[de-identified] : Constitutional: Well-nourished, well-developed, No acute distress\par Respiratory:  Good respiratory effort, no SOB\par Lymphatic: No regional lymphadenopathy, no lymphedema\par Psychiatric: Pleasant and normal affect, alert and oriented x3\par Skin: Clean dry and intact B/L UE/LE\par Musculoskeletal: normal except where as noted in regional exam\par \par Bilateral Shoulders:\par APPEARANCE: no marked deformities, no swelling or malalignment\par POSITIVE TENDERNESS:supraspinatus, LH biceps\par NONTENDER: infraspinatus, teres minor,anterior and posterior capsule, AC joint\par ROM: +painful arc, no scapular winging or dyskinesia present\par RESISTIVE TESTING: pain with  5/5 resisted flex/ext, empty can/ER/IR, horizontal abd/add \par SPECIAL TESTS: neg Drop Arm, + Empty Can, + Landry/Neers, neg Haq's, neg Speeds, neg Apprehension, neg cross arm adduction, neg apley's scratch test\par Vasc: 2+ radial pulse\par Neuro: AIN, PIN, Ulnar nerve intact to motor, DTRs 2+/4 biceps, triceps, brachioradialis\par Sensation: Intact to light touch throughout\par B/L Elbows:  No asymmetry, malalignment, or swelling, Full ROM, 5/5 strength in flexion/ext, pronation/supination, Joints stable\par B/L Wrist and Hand:  No asymmetry, malalignment, or swelling, Full ROM, 5/5 strength in wrist and long finger flexion/ext, radial/ulnar deviation, Joints stable\par   [de-identified] : Patient comes to today's visit with outside imaging already performed.  I reviewed the images in detail with the patient and discussed the findings as highlighted below. \par \par  MR Cervical Spine No Cont             Final\par \par No Documents Attached\par \par \par EXAM: [id number] - MR SPINE CERVICAL  - ORDERED BY: DELFIN BORGES\par \par PROCEDURE DATE:  03/20/2023\par \par \par \par INTERPRETATION:  MRI of the cervical spine\par \par History: Neck pain, history of ampullary adenocarcinoma.\par \par Technique:  Multiplanar, multi sequential images of the cervical spine were obtained without contrast using a standard protocol.\par \par Prior study: None available\par \par Findings:\par \par Bone: No acute fracture.\par \par Alignment: Mild anterolisthesis of C3 on C4. Trace stepwise retrolisthesis of C4 on C5, C5 on C6, and C6 on C7. Mild anterolisthesis of C7 on T1. Mild stepwise anterolisthesis from T2 to T5\par \par Disc spaces: Multilevel degenerative disease throughout the cervical spine, worst in the mid cervical spine.\par \par Spinal cord:  No cord signal abnormality. There is an incompletely evaluated lobulated epidural lesion along the right aspect of the T2-T3 disc space, measuring up to 8 mm (series 3 image 7).\par \par Paraspinal/Prevertebral soft tissues: Indeterminate T2 hyperintense lesion within the right lobe of the thyroid, measuring up to 1.1 cm.\par \par Evaluation of the individual motion segments demonstrates the following:\par \par C1/2 level: Severe arthrosis between the anterior arch of C1 and C2. No central canal narrowing.\par \par C2/3 level: Minimal disc bulge. Mild left facet arthrosis. No canal or neuroforaminal stenosis.\par \par C3/4 level: Disc bulge with disc osteophyte complex. Mild bilateral facet arthrosis. Moderate right and mild left uncovertebral joint hypertrophy. Mild left and severe right neural foraminal narrowing. Mild central canal stenosis.\par \par C4/5 level: Disc bulge with disc osteophyte complex. Mild bilateral facet arthrosis. Moderate bilateral uncovertebral joint hypertrophy. Ligamentum flavum hypertrophy. Moderate central canal stenosis. Severe left and moderate/severe right neural foraminal narrowing.\par \par C5/6 level: Disc bulge with disc osteophyte complex. Mild bilateral facet arthrosis and moderate bilateral uncovertebral joint hypertrophy. Moderate/severe left and moderate right neural foraminal narrowing. Moderate central canal stenosis.\par \par C6/7 level: Disc bulge with disc osteophyte complex. Mild bilateral facet arthrosis. Moderate bilateral uncovertebral joint hypertrophy. Severe left and mild/moderate right neural foraminal narrowing. Mild/moderate central canal stenosis.\par \par C7/T1 level: Uncovering the posterior disc space with disc bulge. Moderate right and mild left facet arthrosis. Mild bilateral neural foraminal narrowing. No central canal stenosis.\par \par Impression:\par \par Partially evaluated epidural lesion along the right aspect of the posterior T2-T3 disc space, which may represent an extruded disc. However, recommend further evaluation with thoracic spine MRI without and with contrast to rule out an enhancing mass/neoplasm, which is also within the differential.\par \par Multilevel degenerative changes throughout the cervical spine, as detailed above, resulting in up to moderate central canal narrowing at C4-C5 and C5-C6. Multilevel neuroforaminal narrowing, as above.\par \par Indeterminate T2 hyperintense lesion within the right lobe of the thyroid. Consider further evaluation with thyroid ultrasound.\par \par --- End of Report ---\par \par \par

## 2023-03-30 NOTE — DISCUSSION/SUMMARY
[de-identified] : Nisha and I discussed her MRI.  There is evidence of multilevel cervical spondylosis, which is likely causing her arm pain.  She also has possible extruded disc in her thoracic spine.  I have advised that she follow-up with Dr. Whitten to discuss these findings.  Referral provided can follow-up for her left shoulder as needed.\par \par Roxie Boss MD, EdM\par Sports Medicine [facility]\par Department of Orthopedics

## 2023-05-08 PROBLEM — M54.9 ACUTE BACK PAIN, UNSPECIFIED BACK LOCATION, UNSPECIFIED BACK PAIN LATERALITY: Status: ACTIVE | Noted: 2023-01-01

## 2023-05-08 NOTE — PHYSICAL EXAM
[de-identified] : Cervical Physical Exam\par \par Gait - Normal; unable to perform tandem gait (no use of cane or any assisted devices)\par \par Station - Normal\par \par Sagittal balance - Normal\par \par Compensatory mechanism? - None\par \par Horizontal gaze - Maintained\par \par Reflexes\par Biceps - Normal\par Triceps - Normal\par Brachioradialis - Normal\par Patellar - Normal\par Gastroc - Normal\par Clonus -No\par \par Peters´s - None\par \par Shoulder Exam - Pain with forward elevation in the scapular plane\par Pain with external rotation\par Pain with supraspinatus testing \par \par Spurling´s - None\par \par Wrist Pulses -2+ radial/ulnar\par \par Foot Pulses -2+ DP/PT\par \par Cervical range of motion - Normal\par \par Sensation\par C5-T1 sensation intact to light touch bilaterally\par \par L1-S1 sensation intact to light touch bilaterally\par \par Motor\par \par 	Deltoid	Biceps	Triceps	WF	WE	IO	\par Right	5/5	5/5	5/5	5/5	5/5	5/5	5/5\par Left	5/5	5/5	5/5	5/5	5/5	5/5	5/5\par \par \par 	IP	Quad	HS	TA	Gastroc	EHL\par Right	5/5	5/5	5/5	5/5	5/5	5/5\par Left	5/5	5/5	5/5	5/5	5/5	5/5  [de-identified] : [facility] - MRI\par 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\par \par Thoracic MRI reviewed\par There is question in regards to whether or not there is a disc which corresponds to the large T-spine disc noted in the cervical MRI.  This could be a schwannoma.  The radiologist recommended MRI with and without contrast.  Given her symptoms I do think this is a reasonable next step.

## 2023-05-08 NOTE — ASSESSMENT
[FreeTextEntry1] : I had a long lengthy discussion with the patient in regards to her treatment plan and diagnosis.  In order to fully evaluate this lesion involving her thoracic spinal canal we will obtain a thoracic MRI with and without contrast.  She should continue being active and walking is much as possible.  She did state that she would like to avoid surgery and we will continue to work on trying to avoid surgery but before doing this I would like to get a full understanding of what is going on with her spine.  I will have her follow-up in 3 to 4 weeks.  All questions were answered.

## 2023-05-08 NOTE — HISTORY OF PRESENT ILLNESS
[de-identified] : 89 year old female who presents for follow-up evaluation of her neck pain and radicular sxs down her b/l upper extremities.  The patient states that she is still dealing with bilateral shoulder pain.  She has no new issues in terms of balance or hand dexterity.  She denies any real radiating type symptoms down her arms or down her legs currently.  She is here today to go over her thoracic MRI results.\par \par 03/27/2023\par 89 year old female who presents for initial evaluation of her neck pain and radicular sxs down her b/l upper extremities. Patient reports onset of sxs after undergoing 15 sessions of radiation that require her to lift her arms up above her head for long periods of time. Patient reports minor issues with balance and falls but reports that is due to old age and has been going on for about 2-3 years. Patient has been utilizing compression sleeves for her pain sxs with mild relief. Patient reports difficulty sleeping on her LT side due to the pain. \par

## 2023-06-26 NOTE — ASSESSMENT
[FreeTextEntry1] : Ms. SONJA WOOD is presenting Thoracic T2 Mass; Benign tumor of the lying of the spinal cord likely a meningioma.\par The recommendation is for the following:\par Imaging evaluation shows evidence of Thoracic T2 Mass; Benign tumor of the lying of the spinal cord likely a meningioma coming off T2 Nerve root. Likely no causing arm pain.\par The mass has increased in size when compared with prior MRI. The growth that we are seeing may be different in relations to technique.\par \par Plan:\par Repeat MRI Thoracic Spine in 2- 3 months\par \par Please see Dr. Chaidez's dictation for details.\par I, Dr. Ellen Chaidez evaluated the patient with the nurse practitioner Abdirahman Darby and established the plan of care. I personally discuss this patient with the nurse practitioner at the time of the visit. I agree with the assessment and plan as written, unless noted below.\par \par \par \par \par

## 2023-06-26 NOTE — REVIEW OF SYSTEMS
[As Noted in HPI] : as noted in HPI [Abnormal Sensation] : an abnormal sensation [Arthralgias] : arthralgias [Negative] : Heme/Lymph

## 2023-06-26 NOTE — PHYSICAL EXAM
[General Appearance - Alert] : alert [General Appearance - In No Acute Distress] : in no acute distress [Oriented To Time, Place, And Person] : oriented to person, place, and time [Impaired Insight] : insight and judgment were intact [No Visual Abnormalities] : no visible abnormailities [Non- Antalgic] : non-antalgic [Sclera] : the sclera and conjunctiva were normal [Outer Ear] : the ears and nose were normal in appearance [Neck Appearance] : the appearance of the neck was normal [] : no respiratory distress [Heart Rate And Rhythm] : heart rate was normal and rhythm regular [Skin Color & Pigmentation] : normal skin color and pigmentation [FreeTextEntry1] : Mild gait ataxia; Slow steady gait

## 2023-06-26 NOTE — HISTORY OF PRESENT ILLNESS
[FreeTextEntry1] : bilateral arm pain and balance difficulty [de-identified] : Ms. 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 She reports a history of bilateral arm pain secondary to rotator cuff injury. She reports that her medical issues started after her diagnosis with bile duct mass which was treated with stent and radiation treatment. She comes today for evaluation of thoracic mass. She denies any neurological deficits.\par \par

## 2023-06-27 PROBLEM — F41.9 ANXIETY: Status: ACTIVE | Noted: 2023-01-01

## 2023-06-27 PROBLEM — G47.00 INSOMNIA: Status: ACTIVE | Noted: 2019-12-06

## 2023-06-27 PROBLEM — R19.4 CHANGE IN BOWEL HABITS: Status: ACTIVE | Noted: 2023-01-01

## 2023-06-27 NOTE — PHYSICAL EXAM
[No Acute Distress] : no acute distress [Well Nourished] : well nourished [Well Developed] : well developed [Supple] : supple [No Respiratory Distress] : no respiratory distress  [Clear to Auscultation] : lungs were clear to auscultation bilaterally [Normal Rate] : normal rate  [Regular Rhythm] : with a regular rhythm [Normal S1, S2] : normal S1 and S2 [No Edema] : there was no peripheral edema [Soft] : abdomen soft [Non Tender] : non-tender [Normal Bowel Sounds] : normal bowel sounds [No CVA Tenderness] : no CVA  tenderness [No Spinal Tenderness] : no spinal tenderness [No Joint Swelling] : no joint swelling [Normal Gait] : normal gait [Speech Grossly Normal] : speech grossly normal [Alert and Oriented x3] : oriented to person, place, and time [Normal Mood] : the mood was normal [de-identified] : female in stated age,

## 2023-06-27 NOTE — HISTORY OF PRESENT ILLNESS
[Other: _____] : [unfilled] [FreeTextEntry1] : Patient presented for the first time for transition of care, she's looking for a new PCP.  Her previous PCP has just retired. [de-identified] : Pt takes Xanax 0.25 mg 1/2 tab occ (1-2x per week) to prevent panic attack.  She also takes Ambien (1/2 tab of 10 mg) to sleep, and takes it almost every night, occ she takes Melatonin, and it works some of the time.\par \par Pt has a lot of stress, as she recently had jaundice requiring a procedure with stent in bile duct, noted a mass and treated with  XRT.  She is a  and has a son with seizure who lives in a group home but comes home every 2 weeks, so she has a lot of anxiety.  She takes Xanax 1/2 tablet 1-2 x per week.  So, 30 tabs can last a long time.\par \par Pt is very careful not to take Xanax and Ambien at the same time.\par \par Pt noted her bowel is different since she has a bile duct stent.  She goes frequently, the color is different, like there is a coating, but stool if formed, no diarrhea.  She goes 4-6 x per day.  She has the occ cramps, gassy feeling and bloating.  She takes Simethicone occ with relief.  She was given cholestyramine powder but did not tolerate it.  She is also taking Omeprazole.\par

## 2023-07-27 PROBLEM — M48.02 CERVICAL STENOSIS OF SPINE: Status: ACTIVE | Noted: 2023-01-01

## 2023-07-27 PROBLEM — Z00.00 GENERAL MEDICAL EXAM: Status: ACTIVE | Noted: 2023-01-01

## 2023-07-27 PROBLEM — K21.9 CHRONIC GERD: Status: ACTIVE | Noted: 2020-01-18

## 2023-07-27 NOTE — REVIEW OF SYSTEMS
[Back Pain] : back pain [Negative] : Heme/Lymph [FreeTextEntry2] : Decreased appetite [FreeTextEntry9] : Neck pain, left shoulder pain

## 2023-07-27 NOTE — HISTORY OF PRESENT ILLNESS
[FreeTextEntry1] : Establish Care, CPE [de-identified] : Ms. WOOD is a 89 year old female that presents to the office as a new patient for a CPE. The patient has a history of GERD, HTN, HLD, insomnia, anxiety.\par \par -Pt with neck pain and left arm pain - s/p rotator cuff surgery in the past and s/p cervical and thoracic MRI - follows with Orthopedics - has had injections to shoulder which have helped\par -On thoracic MRI (2 months ago) mass found on spine - thought to be benign, no compression - will have repeat MRI for surveillance\par -Pt has fallen several times - slipped off bed and couch\par -Pt with hearing loss, has hearing aides, follows with audiology and hearing aides recently adjusted\par -Pt had mass in biliary tree - unclear etiology, biopsies x3 not revealing, pt is s/p 15 rounds of radiation and had stent placed\par -Pt reports some overall decrease in appetite since biliary stent placed, follows with GI, also with intermittent constipation, but pt reports she doesn't drink much water\par -Recent PET scan in Jan 2023 with no evidence of mets\par -Pt has takes ambien, xanax PRN for sleep/anxiety\par \par

## 2023-07-27 NOTE — ASSESSMENT
[FreeTextEntry1] : Pt noted her bowel is different since she had the bile duct stent. She goes frequently, the color is different, like there is a coating, but stool if formed, no diarrhea. She goes 4-6 x per day but this has slowed down.\par -She has intermittent cramps, gas and bloating - taking Simethicone occasionally with relief. She was given cholestyramine powder but did not tolerate it. She is also taking Omeprazole.\par -Recommended fiber supplement- metamucil\par -F/up w/ GI\par -Advised to increase portion size as able and to supplement diet with Ensure or Boost\par \par -Recent PET scan w/ no evidence of mets\par -S/p 15 rounds of radiation\par \par -F/up orthopedics for left shoulder\par \par -Pt using xanax, ambien for sleep/anxiety, also on Gabapentin - psychiatry referral provided -  some concerns with these medications especially due to frequent falls. Has tried Trazodone but could not tolerate.\par \par -Labs drawn today

## 2023-07-27 NOTE — HEALTH RISK ASSESSMENT
[No] : No [With Family] : lives with family [Retired] : retired [] :  [# Of Children ___] : has [unfilled] children [Feels Safe at Home] : Feels safe at home [Fully functional (bathing, dressing, toileting, transferring, walking, feeding)] : Fully functional (bathing, dressing, toileting, transferring, walking, feeding) [Fully functional (using the telephone, shopping, preparing meals, housekeeping, doing laundry, using] : Fully functional and needs no help or supervision to perform IADLs (using the telephone, shopping, preparing meals, housekeeping, doing laundry, using transportation, managing medications and managing finances) [Never] : Never [Change in mental status noted] : No change in mental status noted [Sexually Active] : not sexually active [MammogramDate] : About 7 years ago [ColonoscopyDate] : Pt unsure

## 2023-09-25 PROBLEM — D32.1 MENINGIOMA, SPINAL: Status: ACTIVE | Noted: 2023-01-01

## 2023-09-25 PROBLEM — R22.2 MASS OF THORACIC STRUCTURE: Status: ACTIVE | Noted: 2023-01-01

## 2023-09-28 PROBLEM — M67.40 VOLAR RETINACULAR GANGLION: Status: ACTIVE | Noted: 2023-01-01

## 2023-09-28 PROBLEM — M65.341 TRIGGER RING FINGER OF RIGHT HAND: Status: ACTIVE | Noted: 2023-01-01

## 2023-10-01 PROBLEM — K86.89 MASS OF PANCREAS: Status: ACTIVE | Noted: 2022-08-02

## 2023-10-10 NOTE — PRE-OP CHECKLIST - WAS PATIENT ON BETA BLOCKER?
81yo female with a h/o COPD, CKD, PVD, HTN sent from clinic for shortness of breath and further workup.    Reviewed outside records:  Today: Office Visit for increased weakness, dizziness, shortness of breath, fatigue and edema.  10/2/23: Nursing home visit w/ [facility]  8/31-9/9/23: Admitted for copd exacerbation and acute respiratory failure requiring HHFNC    Patient left prior to being seen by a provider.  She took off her oxygen and left with family as there is currently a visitor limit. I was unable to have a conversation with her regarding the risks of leaving and she was marked as left without being seen.     Fatoumata Barraza, CRICKET  10/10/23 [id number]     Yes

## 2023-10-16 PROBLEM — R22.32 FINGER MASS, LEFT: Status: ACTIVE | Noted: 2023-01-01

## 2023-10-19 PROBLEM — R63.4 WEIGHT LOSS: Status: ACTIVE | Noted: 2023-01-01

## 2023-10-19 PROBLEM — D64.9 ANEMIA: Status: ACTIVE | Noted: 2023-01-01

## 2023-10-19 PROBLEM — G62.9 NEUROPATHY: Status: ACTIVE | Noted: 2023-01-01

## 2023-12-16 PROBLEM — E78.5 HYPERLIPIDEMIA: Status: ACTIVE | Noted: 2020-12-14

## 2023-12-16 PROBLEM — I10 HYPERTENSION: Status: ACTIVE | Noted: 2017-10-03

## 2023-12-16 PROBLEM — D13.5 AMPULLARY ADENOMA: Status: ACTIVE | Noted: 2022-08-05

## 2023-12-16 NOTE — HISTORY OF PRESENT ILLNESS
[FreeTextEntry1] : Follow up [de-identified] : Ms. 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

## 2024-01-01 ENCOUNTER — TRANSCRIPTION ENCOUNTER (OUTPATIENT)
Age: 89
End: 2024-01-01

## 2024-01-01 ENCOUNTER — INPATIENT (INPATIENT)
Facility: HOSPITAL | Age: 89
LOS: 0 days | DRG: 436 | End: 2024-01-18
Attending: INTERNAL MEDICINE | Admitting: STUDENT IN AN ORGANIZED HEALTH CARE EDUCATION/TRAINING PROGRAM
Payer: MEDICARE

## 2024-01-01 ENCOUNTER — APPOINTMENT (OUTPATIENT)
Dept: NUCLEAR MEDICINE | Facility: IMAGING CENTER | Age: 89
End: 2024-01-01

## 2024-01-01 VITALS
HEART RATE: 57 BPM | HEIGHT: 62.99 IN | TEMPERATURE: 98 F | RESPIRATION RATE: 18 BRPM | DIASTOLIC BLOOD PRESSURE: 64 MMHG | WEIGHT: 117.07 LBS | OXYGEN SATURATION: 100 % | SYSTOLIC BLOOD PRESSURE: 137 MMHG

## 2024-01-01 VITALS
HEIGHT: 63 IN | TEMPERATURE: 97 F | RESPIRATION RATE: 18 BRPM | SYSTOLIC BLOOD PRESSURE: 112 MMHG | HEART RATE: 94 BPM | WEIGHT: 117.07 LBS | OXYGEN SATURATION: 97 % | DIASTOLIC BLOOD PRESSURE: 72 MMHG

## 2024-01-01 DIAGNOSIS — F41.9 ANXIETY DISORDER, UNSPECIFIED: ICD-10-CM
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
## 2024-01-01 DIAGNOSIS — Z98.891 HISTORY OF UTERINE SCAR FROM PREVIOUS SURGERY: Chronic | ICD-10-CM

## 2024-01-01 DIAGNOSIS — R13.10 DYSPHAGIA, UNSPECIFIED: ICD-10-CM

## 2024-01-01 DIAGNOSIS — Z29.9 ENCOUNTER FOR PROPHYLACTIC MEASURES, UNSPECIFIED: ICD-10-CM

## 2024-01-01 DIAGNOSIS — K21.9 GASTRO-ESOPHAGEAL REFLUX DISEASE WITHOUT ESOPHAGITIS: ICD-10-CM

## 2024-01-01 DIAGNOSIS — D64.9 ANEMIA, UNSPECIFIED: ICD-10-CM

## 2024-01-01 DIAGNOSIS — R11.2 NAUSEA WITH VOMITING, UNSPECIFIED: ICD-10-CM

## 2024-01-01 DIAGNOSIS — K86.89 OTHER SPECIFIED DISEASES OF PANCREAS: ICD-10-CM

## 2024-01-01 DIAGNOSIS — I10 ESSENTIAL (PRIMARY) HYPERTENSION: ICD-10-CM

## 2024-01-01 LAB
ALBUMIN SERPL ELPH-MCNC: 3.4 G/DL — SIGNIFICANT CHANGE UP (ref 3.3–5)
ALBUMIN SERPL ELPH-MCNC: 4 G/DL — SIGNIFICANT CHANGE UP (ref 3.3–5)
ALP SERPL-CCNC: 237 U/L — HIGH (ref 40–120)
ALP SERPL-CCNC: 260 U/L — HIGH (ref 40–120)
ALT FLD-CCNC: 15 U/L — SIGNIFICANT CHANGE UP (ref 10–45)
ALT FLD-CCNC: 19 U/L — SIGNIFICANT CHANGE UP (ref 10–45)
ANION GAP SERPL CALC-SCNC: 10 MMOL/L — SIGNIFICANT CHANGE UP (ref 5–17)
ANION GAP SERPL CALC-SCNC: 12 MMOL/L — SIGNIFICANT CHANGE UP (ref 5–17)
ANION GAP SERPL CALC-SCNC: 12 MMOL/L — SIGNIFICANT CHANGE UP (ref 5–17)
APPEARANCE UR: CLEAR — SIGNIFICANT CHANGE UP
APTT BLD: 31 SEC — SIGNIFICANT CHANGE UP (ref 24.5–35.6)
AST SERPL-CCNC: 13 U/L — SIGNIFICANT CHANGE UP (ref 10–40)
AST SERPL-CCNC: 15 U/L — SIGNIFICANT CHANGE UP (ref 10–40)
BACTERIA # UR AUTO: NEGATIVE /HPF — SIGNIFICANT CHANGE UP
BASE EXCESS BLDV CALC-SCNC: 17.7 MMOL/L — HIGH (ref -2–3)
BASE EXCESS BLDV CALC-SCNC: 6.8 MMOL/L — HIGH (ref -2–3)
BASOPHILS # BLD AUTO: 0.02 K/UL — SIGNIFICANT CHANGE UP (ref 0–0.2)
BASOPHILS # BLD AUTO: 0.03 K/UL — SIGNIFICANT CHANGE UP (ref 0–0.2)
BASOPHILS NFR BLD AUTO: 0.3 % — SIGNIFICANT CHANGE UP (ref 0–2)
BASOPHILS NFR BLD AUTO: 0.4 % — SIGNIFICANT CHANGE UP (ref 0–2)
BILIRUB DIRECT SERPL-MCNC: 0.1 MG/DL — SIGNIFICANT CHANGE UP (ref 0–0.3)
BILIRUB SERPL-MCNC: 0.3 MG/DL — SIGNIFICANT CHANGE UP (ref 0.2–1.2)
BILIRUB SERPL-MCNC: 0.3 MG/DL — SIGNIFICANT CHANGE UP (ref 0.2–1.2)
BILIRUB UR-MCNC: NEGATIVE — SIGNIFICANT CHANGE UP
BLD GP AB SCN SERPL QL: NEGATIVE — SIGNIFICANT CHANGE UP
BUN SERPL-MCNC: 30 MG/DL — HIGH (ref 7–23)
BUN SERPL-MCNC: 34 MG/DL — HIGH (ref 7–23)
BUN SERPL-MCNC: 43 MG/DL — HIGH (ref 7–23)
CA-I SERPL-SCNC: 1.15 MMOL/L — SIGNIFICANT CHANGE UP (ref 1.15–1.33)
CA-I SERPL-SCNC: 1.2 MMOL/L — SIGNIFICANT CHANGE UP (ref 1.15–1.33)
CALCIUM SERPL-MCNC: 10.1 MG/DL — SIGNIFICANT CHANGE UP (ref 8.4–10.5)
CALCIUM SERPL-MCNC: 8.2 MG/DL — LOW (ref 8.4–10.5)
CALCIUM SERPL-MCNC: 9.1 MG/DL — SIGNIFICANT CHANGE UP (ref 8.4–10.5)
CAST: 5 /LPF — HIGH (ref 0–4)
CHLORIDE BLDV-SCNC: 94 MMOL/L — LOW (ref 96–108)
CHLORIDE BLDV-SCNC: 99 MMOL/L — SIGNIFICANT CHANGE UP (ref 96–108)
CHLORIDE SERPL-SCNC: 103 MMOL/L — SIGNIFICANT CHANGE UP (ref 96–108)
CHLORIDE SERPL-SCNC: 91 MMOL/L — LOW (ref 96–108)
CHLORIDE SERPL-SCNC: 93 MMOL/L — LOW (ref 96–108)
CO2 BLDV-SCNC: 33 MMOL/L — HIGH (ref 22–26)
CO2 BLDV-SCNC: 44 MMOL/L — HIGH (ref 22–26)
CO2 SERPL-SCNC: 27 MMOL/L — SIGNIFICANT CHANGE UP (ref 22–31)
CO2 SERPL-SCNC: 28 MMOL/L — SIGNIFICANT CHANGE UP (ref 22–31)
CO2 SERPL-SCNC: 36 MMOL/L — HIGH (ref 22–31)
COLOR SPEC: YELLOW — SIGNIFICANT CHANGE UP
CREAT SERPL-MCNC: 1.23 MG/DL — SIGNIFICANT CHANGE UP (ref 0.5–1.3)
CREAT SERPL-MCNC: 1.25 MG/DL — SIGNIFICANT CHANGE UP (ref 0.5–1.3)
CREAT SERPL-MCNC: 1.53 MG/DL — HIGH (ref 0.5–1.3)
DIFF PNL FLD: NEGATIVE — SIGNIFICANT CHANGE UP
EGFR: 32 ML/MIN/1.73M2 — LOW
EGFR: 41 ML/MIN/1.73M2 — LOW
EGFR: 41 ML/MIN/1.73M2 — LOW
EOSINOPHIL # BLD AUTO: 0 K/UL — SIGNIFICANT CHANGE UP (ref 0–0.5)
EOSINOPHIL # BLD AUTO: 0.02 K/UL — SIGNIFICANT CHANGE UP (ref 0–0.5)
EOSINOPHIL NFR BLD AUTO: 0 % — SIGNIFICANT CHANGE UP (ref 0–6)
EOSINOPHIL NFR BLD AUTO: 0.3 % — SIGNIFICANT CHANGE UP (ref 0–6)
FLUAV AG NPH QL: SIGNIFICANT CHANGE UP
FLUBV AG NPH QL: SIGNIFICANT CHANGE UP
GAS PNL BLDV: 135 MMOL/L — LOW (ref 136–145)
GAS PNL BLDV: 139 MMOL/L — SIGNIFICANT CHANGE UP (ref 136–145)
GAS PNL BLDV: SIGNIFICANT CHANGE UP
GLUCOSE BLDV-MCNC: 194 MG/DL — HIGH (ref 70–99)
GLUCOSE BLDV-MCNC: 241 MG/DL — HIGH (ref 70–99)
GLUCOSE SERPL-MCNC: 166 MG/DL — HIGH (ref 70–99)
GLUCOSE SERPL-MCNC: 198 MG/DL — HIGH (ref 70–99)
GLUCOSE SERPL-MCNC: 252 MG/DL — HIGH (ref 70–99)
GLUCOSE UR QL: NEGATIVE MG/DL — SIGNIFICANT CHANGE UP
HCO3 BLDV-SCNC: 32 MMOL/L — HIGH (ref 22–29)
HCO3 BLDV-SCNC: 42 MMOL/L — HIGH (ref 22–29)
HCT VFR BLD CALC: 23.3 % — LOW (ref 34.5–45)
HCT VFR BLD CALC: 25.7 % — LOW (ref 34.5–45)
HCT VFR BLDA CALC: 23 % — LOW (ref 34.5–46.5)
HCT VFR BLDA CALC: 27 % — LOW (ref 34.5–46.5)
HGB BLD CALC-MCNC: 7.5 G/DL — LOW (ref 11.7–16.1)
HGB BLD CALC-MCNC: 8.9 G/DL — LOW (ref 11.7–16.1)
HGB BLD-MCNC: 7.4 G/DL — LOW (ref 11.5–15.5)
HGB BLD-MCNC: 8.3 G/DL — LOW (ref 11.5–15.5)
IMM GRANULOCYTES NFR BLD AUTO: 0.3 % — SIGNIFICANT CHANGE UP (ref 0–0.9)
IMM GRANULOCYTES NFR BLD AUTO: 0.7 % — SIGNIFICANT CHANGE UP (ref 0–0.9)
INR BLD: 1.17 RATIO — SIGNIFICANT CHANGE UP (ref 0.85–1.18)
KETONES UR-MCNC: ABNORMAL MG/DL
LACTATE BLDV-MCNC: 1 MMOL/L — SIGNIFICANT CHANGE UP (ref 0.5–2)
LACTATE BLDV-MCNC: 1.7 MMOL/L — SIGNIFICANT CHANGE UP (ref 0.5–2)
LEUKOCYTE ESTERASE UR-ACNC: ABNORMAL
LYMPHOCYTES # BLD AUTO: 0.57 K/UL — LOW (ref 1–3.3)
LYMPHOCYTES # BLD AUTO: 0.66 K/UL — LOW (ref 1–3.3)
LYMPHOCYTES # BLD AUTO: 8 % — LOW (ref 13–44)
LYMPHOCYTES # BLD AUTO: 9.6 % — LOW (ref 13–44)
MAGNESIUM SERPL-MCNC: 2 MG/DL — SIGNIFICANT CHANGE UP (ref 1.6–2.6)
MAGNESIUM SERPL-MCNC: 2.3 MG/DL — SIGNIFICANT CHANGE UP (ref 1.6–2.6)
MCHC RBC-ENTMCNC: 29.4 PG — SIGNIFICANT CHANGE UP (ref 27–34)
MCHC RBC-ENTMCNC: 29.4 PG — SIGNIFICANT CHANGE UP (ref 27–34)
MCHC RBC-ENTMCNC: 31.8 GM/DL — LOW (ref 32–36)
MCHC RBC-ENTMCNC: 32.3 GM/DL — SIGNIFICANT CHANGE UP (ref 32–36)
MCV RBC AUTO: 91.1 FL — SIGNIFICANT CHANGE UP (ref 80–100)
MCV RBC AUTO: 92.5 FL — SIGNIFICANT CHANGE UP (ref 80–100)
MONOCYTES # BLD AUTO: 0.54 K/UL — SIGNIFICANT CHANGE UP (ref 0–0.9)
MONOCYTES # BLD AUTO: 0.57 K/UL — SIGNIFICANT CHANGE UP (ref 0–0.9)
MONOCYTES NFR BLD AUTO: 7.6 % — SIGNIFICANT CHANGE UP (ref 2–14)
MONOCYTES NFR BLD AUTO: 8.3 % — SIGNIFICANT CHANGE UP (ref 2–14)
NEUTROPHILS # BLD AUTO: 5.58 K/UL — SIGNIFICANT CHANGE UP (ref 1.8–7.4)
NEUTROPHILS # BLD AUTO: 5.93 K/UL — SIGNIFICANT CHANGE UP (ref 1.8–7.4)
NEUTROPHILS NFR BLD AUTO: 81.1 % — HIGH (ref 43–77)
NEUTROPHILS NFR BLD AUTO: 83.4 % — HIGH (ref 43–77)
NITRITE UR-MCNC: NEGATIVE — SIGNIFICANT CHANGE UP
NRBC # BLD: 0 /100 WBCS — SIGNIFICANT CHANGE UP (ref 0–0)
NRBC # BLD: 0 /100 WBCS — SIGNIFICANT CHANGE UP (ref 0–0)
OTHER CELLS CSF MANUAL: 2.5 ML/DL — LOW (ref 18–22)
PCO2 BLDV: 48 MMHG — HIGH (ref 39–42)
PCO2 BLDV: 48 MMHG — HIGH (ref 39–42)
PH BLDV: 7.43 — SIGNIFICANT CHANGE UP (ref 7.32–7.43)
PH BLDV: 7.55 — HIGH (ref 7.32–7.43)
PH UR: 7.5 — SIGNIFICANT CHANGE UP (ref 5–8)
PHOSPHATE SERPL-MCNC: 2.3 MG/DL — LOW (ref 2.5–4.5)
PHOSPHATE SERPL-MCNC: 2.9 MG/DL — SIGNIFICANT CHANGE UP (ref 2.5–4.5)
PLATELET # BLD AUTO: 213 K/UL — SIGNIFICANT CHANGE UP (ref 150–400)
PLATELET # BLD AUTO: 252 K/UL — SIGNIFICANT CHANGE UP (ref 150–400)
PO2 BLDV: 18 MMHG — LOW (ref 25–45)
PO2 BLDV: 19 MMHG — LOW (ref 25–45)
POTASSIUM BLDV-SCNC: 3.4 MMOL/L — LOW (ref 3.5–5.1)
POTASSIUM BLDV-SCNC: 3.5 MMOL/L — SIGNIFICANT CHANGE UP (ref 3.5–5.1)
POTASSIUM SERPL-MCNC: 3.3 MMOL/L — LOW (ref 3.5–5.3)
POTASSIUM SERPL-MCNC: 3.5 MMOL/L — SIGNIFICANT CHANGE UP (ref 3.5–5.3)
POTASSIUM SERPL-MCNC: 4.5 MMOL/L — SIGNIFICANT CHANGE UP (ref 3.5–5.3)
POTASSIUM SERPL-SCNC: 3.3 MMOL/L — LOW (ref 3.5–5.3)
POTASSIUM SERPL-SCNC: 3.5 MMOL/L — SIGNIFICANT CHANGE UP (ref 3.5–5.3)
POTASSIUM SERPL-SCNC: 4.5 MMOL/L — SIGNIFICANT CHANGE UP (ref 3.5–5.3)
PROT SERPL-MCNC: 6.6 G/DL — SIGNIFICANT CHANGE UP (ref 6–8.3)
PROT SERPL-MCNC: 7.6 G/DL — SIGNIFICANT CHANGE UP (ref 6–8.3)
PROT UR-MCNC: SIGNIFICANT CHANGE UP MG/DL
PROTHROM AB SERPL-ACNC: 12.8 SEC — SIGNIFICANT CHANGE UP (ref 9.5–13)
RBC # BLD: 2.52 M/UL — LOW (ref 3.8–5.2)
RBC # BLD: 2.82 M/UL — LOW (ref 3.8–5.2)
RBC # FLD: 13.4 % — SIGNIFICANT CHANGE UP (ref 10.3–14.5)
RBC # FLD: 13.8 % — SIGNIFICANT CHANGE UP (ref 10.3–14.5)
RBC CASTS # UR COMP ASSIST: 1 /HPF — SIGNIFICANT CHANGE UP (ref 0–4)
REVIEW: SIGNIFICANT CHANGE UP
RH IG SCN BLD-IMP: POSITIVE — SIGNIFICANT CHANGE UP
RSV RNA NPH QL NAA+NON-PROBE: SIGNIFICANT CHANGE UP
SAO2 % BLDV: 19.9 % — LOW (ref 67–88)
SAO2 % BLDV: 22.1 % — LOW (ref 67–88)
SARS-COV-2 RNA SPEC QL NAA+PROBE: SIGNIFICANT CHANGE UP
SODIUM SERPL-SCNC: 132 MMOL/L — LOW (ref 135–145)
SODIUM SERPL-SCNC: 139 MMOL/L — SIGNIFICANT CHANGE UP (ref 135–145)
SODIUM SERPL-SCNC: 141 MMOL/L — SIGNIFICANT CHANGE UP (ref 135–145)
SP GR SPEC: 1.02 — SIGNIFICANT CHANGE UP (ref 1–1.03)
SQUAMOUS # UR AUTO: 2 /HPF — SIGNIFICANT CHANGE UP (ref 0–5)
TRANSFERRIN SERPL-MCNC: 212 MG/DL — SIGNIFICANT CHANGE UP (ref 200–360)
TROPONIN T, HIGH SENSITIVITY RESULT: 25 NG/L — SIGNIFICANT CHANGE UP (ref 0–51)
UROBILINOGEN FLD QL: 0.2 MG/DL — SIGNIFICANT CHANGE UP (ref 0.2–1)
WBC # BLD: 6.88 K/UL — SIGNIFICANT CHANGE UP (ref 3.8–10.5)
WBC # BLD: 7.11 K/UL — SIGNIFICANT CHANGE UP (ref 3.8–10.5)
WBC # FLD AUTO: 6.88 K/UL — SIGNIFICANT CHANGE UP (ref 3.8–10.5)
WBC # FLD AUTO: 7.11 K/UL — SIGNIFICANT CHANGE UP (ref 3.8–10.5)
WBC UR QL: 24 /HPF — HIGH (ref 0–5)

## 2024-01-01 PROCEDURE — 86900 BLOOD TYPING SEROLOGIC ABO: CPT

## 2024-01-01 PROCEDURE — 85014 HEMATOCRIT: CPT

## 2024-01-01 PROCEDURE — 80053 COMPREHEN METABOLIC PANEL: CPT

## 2024-01-01 PROCEDURE — 96375 TX/PRO/DX INJ NEW DRUG ADDON: CPT

## 2024-01-01 PROCEDURE — 74177 CT ABD & PELVIS W/CONTRAST: CPT | Mod: MA

## 2024-01-01 PROCEDURE — 99223 1ST HOSP IP/OBS HIGH 75: CPT | Mod: GC

## 2024-01-01 PROCEDURE — 84466 ASSAY OF TRANSFERRIN: CPT

## 2024-01-01 PROCEDURE — 86923 COMPATIBILITY TEST ELECTRIC: CPT

## 2024-01-01 PROCEDURE — 70450 CT HEAD/BRAIN W/O DYE: CPT | Mod: 26,MA

## 2024-01-01 PROCEDURE — 70450 CT HEAD/BRAIN W/O DYE: CPT | Mod: MA

## 2024-01-01 PROCEDURE — 82435 ASSAY OF BLOOD CHLORIDE: CPT

## 2024-01-01 PROCEDURE — 99223 1ST HOSP IP/OBS HIGH 75: CPT

## 2024-01-01 PROCEDURE — 85018 HEMOGLOBIN: CPT

## 2024-01-01 PROCEDURE — 85025 COMPLETE CBC W/AUTO DIFF WBC: CPT

## 2024-01-01 PROCEDURE — 86901 BLOOD TYPING SEROLOGIC RH(D): CPT

## 2024-01-01 PROCEDURE — 82550 ASSAY OF CK (CPK): CPT

## 2024-01-01 PROCEDURE — 71045 X-RAY EXAM CHEST 1 VIEW: CPT | Mod: 26

## 2024-01-01 PROCEDURE — 82248 BILIRUBIN DIRECT: CPT

## 2024-01-01 PROCEDURE — 82947 ASSAY GLUCOSE BLOOD QUANT: CPT

## 2024-01-01 PROCEDURE — 99285 EMERGENCY DEPT VISIT HI MDM: CPT

## 2024-01-01 PROCEDURE — 71260 CT THORAX DX C+: CPT | Mod: 26,MA

## 2024-01-01 PROCEDURE — 84100 ASSAY OF PHOSPHORUS: CPT

## 2024-01-01 PROCEDURE — 96374 THER/PROPH/DIAG INJ IV PUSH: CPT

## 2024-01-01 PROCEDURE — 74177 CT ABD & PELVIS W/CONTRAST: CPT | Mod: 26,MA

## 2024-01-01 PROCEDURE — 84132 ASSAY OF SERUM POTASSIUM: CPT

## 2024-01-01 PROCEDURE — 83605 ASSAY OF LACTIC ACID: CPT

## 2024-01-01 PROCEDURE — 92610 EVALUATE SWALLOWING FUNCTION: CPT

## 2024-01-01 PROCEDURE — 87637 SARSCOV2&INF A&B&RSV AMP PRB: CPT

## 2024-01-01 PROCEDURE — 96376 TX/PRO/DX INJ SAME DRUG ADON: CPT

## 2024-01-01 PROCEDURE — 99232 SBSQ HOSP IP/OBS MODERATE 35: CPT

## 2024-01-01 PROCEDURE — 71260 CT THORAX DX C+: CPT | Mod: MA

## 2024-01-01 PROCEDURE — 36430 TRANSFUSION BLD/BLD COMPNT: CPT

## 2024-01-01 PROCEDURE — 82330 ASSAY OF CALCIUM: CPT

## 2024-01-01 PROCEDURE — 84484 ASSAY OF TROPONIN QUANT: CPT

## 2024-01-01 PROCEDURE — 83690 ASSAY OF LIPASE: CPT

## 2024-01-01 PROCEDURE — 85730 THROMBOPLASTIN TIME PARTIAL: CPT

## 2024-01-01 PROCEDURE — 36415 COLL VENOUS BLD VENIPUNCTURE: CPT

## 2024-01-01 PROCEDURE — 81001 URINALYSIS AUTO W/SCOPE: CPT

## 2024-01-01 PROCEDURE — 99291 CRITICAL CARE FIRST HOUR: CPT

## 2024-01-01 PROCEDURE — 80048 BASIC METABOLIC PNL TOTAL CA: CPT

## 2024-01-01 PROCEDURE — 86850 RBC ANTIBODY SCREEN: CPT

## 2024-01-01 PROCEDURE — 83735 ASSAY OF MAGNESIUM: CPT

## 2024-01-01 PROCEDURE — P9016: CPT

## 2024-01-01 PROCEDURE — 85610 PROTHROMBIN TIME: CPT

## 2024-01-01 PROCEDURE — 82803 BLOOD GASES ANY COMBINATION: CPT

## 2024-01-01 PROCEDURE — 84295 ASSAY OF SERUM SODIUM: CPT

## 2024-01-01 PROCEDURE — 71045 X-RAY EXAM CHEST 1 VIEW: CPT

## 2024-01-01 RX ORDER — METOPROLOL TARTRATE 50 MG
50 TABLET ORAL DAILY
Refills: 0 | Status: DISCONTINUED | OUTPATIENT
Start: 2024-01-01 | End: 2024-01-01

## 2024-01-01 RX ORDER — GABAPENTIN 400 MG/1
100 CAPSULE ORAL
Refills: 0 | Status: DISCONTINUED | OUTPATIENT
Start: 2024-01-01 | End: 2024-01-01

## 2024-01-01 RX ORDER — SODIUM CHLORIDE 9 MG/ML
1000 INJECTION, SOLUTION INTRAVENOUS
Refills: 0 | Status: DISCONTINUED | OUTPATIENT
Start: 2024-01-01 | End: 2024-01-01

## 2024-01-01 RX ORDER — SODIUM CHLORIDE 9 MG/ML
1000 INJECTION INTRAMUSCULAR; INTRAVENOUS; SUBCUTANEOUS ONCE
Refills: 0 | Status: COMPLETED | OUTPATIENT
Start: 2024-01-01 | End: 2024-01-01

## 2024-01-01 RX ORDER — ATORVASTATIN CALCIUM 80 MG/1
1 TABLET, FILM COATED ORAL
Refills: 0 | DISCHARGE

## 2024-01-01 RX ORDER — LOSARTAN/HYDROCHLOROTHIAZIDE 100MG-25MG
1 TABLET ORAL
Qty: 0 | Refills: 0 | DISCHARGE

## 2024-01-01 RX ORDER — LANOLIN ALCOHOL/MO/W.PET/CERES
1 CREAM (GRAM) TOPICAL
Qty: 0 | Refills: 0 | DISCHARGE

## 2024-01-01 RX ORDER — ALPRAZOLAM 0.25 MG
0.25 TABLET ORAL ONCE
Refills: 0 | Status: DISCONTINUED | OUTPATIENT
Start: 2024-01-01 | End: 2024-01-01

## 2024-01-01 RX ORDER — LIDOCAINE HCL 20 MG/ML
100 VIAL (ML) INJECTION ONCE
Refills: 0 | Status: COMPLETED | OUTPATIENT
Start: 2024-01-01 | End: 2024-01-01

## 2024-01-01 RX ORDER — POTASSIUM CHLORIDE 20 MEQ
40 PACKET (EA) ORAL ONCE
Refills: 0 | Status: COMPLETED | OUTPATIENT
Start: 2024-01-01 | End: 2024-01-01

## 2024-01-01 RX ORDER — ALPRAZOLAM 0.25 MG
0.25 TABLET ORAL AT BEDTIME
Refills: 0 | Status: DISCONTINUED | OUTPATIENT
Start: 2024-01-01 | End: 2024-01-01

## 2024-01-01 RX ORDER — PANTOPRAZOLE SODIUM 20 MG/1
40 TABLET, DELAYED RELEASE ORAL EVERY 12 HOURS
Refills: 0 | Status: DISCONTINUED | OUTPATIENT
Start: 2024-01-01 | End: 2024-01-01

## 2024-01-01 RX ORDER — ATORVASTATIN CALCIUM 80 MG/1
10 TABLET, FILM COATED ORAL AT BEDTIME
Refills: 0 | Status: DISCONTINUED | OUTPATIENT
Start: 2024-01-01 | End: 2024-01-01

## 2024-01-01 RX ORDER — LANOLIN ALCOHOL/MO/W.PET/CERES
3 CREAM (GRAM) TOPICAL AT BEDTIME
Refills: 0 | Status: DISCONTINUED | OUTPATIENT
Start: 2024-01-01 | End: 2024-01-01

## 2024-01-01 RX ORDER — OMEPRAZOLE 10 MG/1
1 CAPSULE, DELAYED RELEASE ORAL
Qty: 0 | Refills: 0 | DISCHARGE

## 2024-01-01 RX ORDER — SODIUM CHLORIDE 9 MG/ML
500 INJECTION, SOLUTION INTRAVENOUS ONCE
Refills: 0 | Status: COMPLETED | OUTPATIENT
Start: 2024-01-01 | End: 2024-01-01

## 2024-01-01 RX ORDER — POTASSIUM CHLORIDE 20 MEQ
10 PACKET (EA) ORAL
Refills: 0 | Status: COMPLETED | OUTPATIENT
Start: 2024-01-01 | End: 2024-01-01

## 2024-01-01 RX ORDER — METOPROLOL TARTRATE 50 MG
1 TABLET ORAL
Refills: 0 | DISCHARGE

## 2024-01-01 RX ORDER — GABAPENTIN 400 MG/1
1 CAPSULE ORAL
Refills: 0 | DISCHARGE

## 2024-01-01 RX ORDER — ZOLPIDEM TARTRATE 10 MG/1
1 TABLET ORAL
Qty: 0 | Refills: 0 | DISCHARGE

## 2024-01-01 RX ORDER — FAMOTIDINE 10 MG/ML
20 INJECTION INTRAVENOUS ONCE
Refills: 0 | Status: COMPLETED | OUTPATIENT
Start: 2024-01-01 | End: 2024-01-01

## 2024-01-01 RX ORDER — SODIUM CHLORIDE 9 MG/ML
500 INJECTION INTRAMUSCULAR; INTRAVENOUS; SUBCUTANEOUS
Refills: 0 | Status: DISCONTINUED | OUTPATIENT
Start: 2024-01-01 | End: 2024-01-01

## 2024-01-01 RX ORDER — SIMETHICONE 80 MG/1
80 TABLET, CHEWABLE ORAL EVERY 6 HOURS
Refills: 0 | Status: DISCONTINUED | OUTPATIENT
Start: 2024-01-01 | End: 2024-01-01

## 2024-01-01 RX ORDER — PANCRELIPASE 36000; 180000; 114000 [USP'U]/1; [USP'U]/1; [USP'U]/1
36000-114000 CAPSULE, DELAYED RELEASE PELLETS ORAL 3 TIMES DAILY
Qty: 200 | Refills: 2 | Status: ACTIVE | COMMUNITY
Start: 2024-01-01 | End: 1900-01-01

## 2024-01-01 RX ORDER — ALPRAZOLAM 0.25 MG
1 TABLET ORAL
Refills: 0 | DISCHARGE

## 2024-01-01 RX ORDER — PANTOPRAZOLE SODIUM 20 MG/1
40 TABLET, DELAYED RELEASE ORAL ONCE
Refills: 0 | Status: COMPLETED | OUTPATIENT
Start: 2024-01-01 | End: 2024-01-01

## 2024-01-01 RX ADMIN — Medication 40 MILLIEQUIVALENT(S): at 15:53

## 2024-01-01 RX ADMIN — Medication 100 MILLIEQUIVALENT(S): at 14:40

## 2024-01-01 RX ADMIN — GABAPENTIN 100 MILLIGRAM(S): 400 CAPSULE ORAL at 05:37

## 2024-01-01 RX ADMIN — Medication 50 MILLIGRAM(S): at 05:37

## 2024-01-01 RX ADMIN — SODIUM CHLORIDE 500 MILLILITER(S): 9 INJECTION, SOLUTION INTRAVENOUS at 16:07

## 2024-01-01 RX ADMIN — Medication 30 MILLILITER(S): at 05:43

## 2024-01-01 RX ADMIN — PANTOPRAZOLE SODIUM 40 MILLIGRAM(S): 20 TABLET, DELAYED RELEASE ORAL at 05:38

## 2024-01-01 RX ADMIN — Medication 100 MILLIGRAM(S): at 13:52

## 2024-01-01 RX ADMIN — FAMOTIDINE 20 MILLIGRAM(S): 10 INJECTION INTRAVENOUS at 11:50

## 2024-01-01 RX ADMIN — Medication 100 MILLIEQUIVALENT(S): at 15:53

## 2024-01-01 RX ADMIN — SODIUM CHLORIDE 70 MILLILITER(S): 9 INJECTION, SOLUTION INTRAVENOUS at 19:02

## 2024-01-01 RX ADMIN — SODIUM CHLORIDE 1000 MILLILITER(S): 9 INJECTION INTRAMUSCULAR; INTRAVENOUS; SUBCUTANEOUS at 11:51

## 2024-01-01 RX ADMIN — PANTOPRAZOLE SODIUM 40 MILLIGRAM(S): 20 TABLET, DELAYED RELEASE ORAL at 13:40

## 2024-01-01 RX ADMIN — Medication 0.25 MILLIGRAM(S): at 13:36

## 2024-01-01 RX ADMIN — Medication 100 MILLIEQUIVALENT(S): at 13:40

## 2024-01-01 RX ADMIN — Medication 100 MILLIGRAM(S): at 14:45

## 2024-01-01 RX ADMIN — Medication 40 MILLIEQUIVALENT(S): at 19:13

## 2024-01-17 NOTE — CONSULT NOTE ADULT - ASSESSMENT
89yo F with PMH of HTN, GERD, pancreatic mass (inconclusive bx, s/p radiation 2023), presenting with n/v x 6 weeks and fall today at home. Pt's son at bedside assisting with history, has been living with patient for the last several months. Pt reports since beginning of Dec 2023, she has had intermittent nausea, and when she eats or drinks, she feels gas-like abdominal pain, burning like acid reflux, and then proceeds to vomit everything she ate or drank. Tried eating small meals but does not help. +flatulence and having normal BMs, last BM yesterday. D/w her oncologist and was sent for MRI 3 weeks ago revealing increase in pancreatic mass size since 2022. Was supposed to have f/u PET scan but was told her blood sugar was too high to have scan. Weight loss of approx 10-15 lbs during this time. Today patient reports she was getting out of bed this morning and has baseline "balance issues" so she slid down to the floor. Son reports he found patient passed out on bedroom floor. Patient denies any pain from fall. Denies any CP, palpitations, SOB prior to fall or now. Denies any fever/chills, diarrhea, black/bloody stools, urinary symptoms, increased thirst, numbness/tingling, focal weakness, HA, vision changes, h/o diabetes.  \pt is well known to me with hx of abdominal mass with nausea anmd vomiting, with tachycardia  pt is been c/o intermittent palpitation at home  beta blocker as tolerated, if recurrent will start on amio

## 2024-01-17 NOTE — CONSULT NOTE ADULT - SUBJECTIVE AND OBJECTIVE BOX
Date of Service, 24 @ 19:42  CHIEF COMPLAINT:Patient is a 89y old  Female who presents with a chief complaint of     HPI:  89yo F with PMH of HTN, GERD, pancreatic mass (inconclusive bx, s/p radiation ), presenting with n/v x 6 weeks and fall today at home. Pt's son at bedside assisting with history, has been living with patient for the last several months. Pt reports since beginning of Dec 2023, she has had intermittent nausea, and when she eats or drinks, she feels gas-like abdominal pain, burning like acid reflux, and then proceeds to vomit everything she ate or drank. Tried eating small meals but does not help. +flatulence and having normal BMs, last BM yesterday. D/w her oncologist and was sent for MRI 3 weeks ago revealing increase in pancreatic mass size since . Was supposed to have f/u PET scan but was told her blood sugar was too high to have scan. Weight loss of approx 10-15 lbs during this time. Today patient reports she was getting out of bed this morning and has baseline "balance issues" so she slid down to the floor. Son reports he found patient passed out on bedroom floor. Patient denies any pain from fall. Denies any CP, palpitations, SOB prior to fall or now. Denies any fever/chills, diarrhea, black/bloody stools, urinary symptoms, increased thirst, numbness/tingling, focal weakness, HA, vision changes, h/o diabetes.    PAST MEDICAL & SURGICAL HISTORY:  HLD (hyperlipidemia)  GERD (gastroesophageal reflux disease)  HTN (hypertension)  H/O:     MEDICATIONS  (STANDING):  lactated ringers. 1000 milliLiter(s) (70 mL/Hr) IV Continuous <Continuous>  cholestyramine 4 g/9 g oral powder for reconstitution: 4 gram(s) orally 4 times a day PRN for pruritis  · 	simethicone 80 mg oral tablet, chewable: 1 tab(s) orally 4 times a day  · 	omeprazole 20 mg oral delayed release capsule: 1 cap(s) orally once a day  · 	zolpidem 5 mg oral tablet: 1 tab(s) orally once a day (at bedtime)  · 	losartan-hydrochlorothiazide 50 mg-12.5 mg oral tablet: 1 tab(s) orally once a day  · 	Melatonin 5 mg oral tablet: 1 tab(s) orally once a day (at bedtime)    MEDICATIONS  (PRN):      FAMILY HISTORY:  Family history of diabetes mellitus (DM) (Mother)    FH: liver cancer (Child)        SOCIAL HISTORY:    [x ] Non-smoker  [ ] Smoker  [ ] Alcohol    Allergies    tetracaine (Unknown)  penicillin (Unknown)    Intolerances    	    REVIEW OF SYSTEMS:  CONSTITUTIONAL: No fever, weight loss, or fatigue  EYES: No eye pain, visual disturbances, or discharge  ENT:  No difficulty hearing, tinnitus, vertigo; No sinus or throat pain  NECK: No pain or stiffness  RESPIRATORY: No cough, wheezing, chills or hemoptysis; + Shortness of Breath  CARDIOVASCULAR: No chest pain, palpitations, passing out, dizziness, or leg swelling  GASTROINTESTINAL: No abdominal or epigastric pain. No nausea, vomiting, or hematemesis; No diarrhea or constipation. No melena or hematochezia.  GENITOURINARY: No dysuria, frequency, hematuria, or incontinence  NEUROLOGICAL: No headaches, memory loss, loss of strength, numbness, or tremors  SKIN: No itching, burning, rashes, or lesions   LYMPH Nodes: No enlarged glands  ENDOCRINE: No heat or cold intolerance; No hair loss  MUSCULOSKELETAL: No joint pain or swelling; No muscle, back, or extremity pain  PSYCHIATRIC: No depression, anxiety, mood swings, or difficulty sleeping  HEME/LYMPH: No easy bruising, or bleeding gums  ALLERGY AND IMMUNOLOGIC: No hives or eczema	    [ ] All others negative	  [ ] Unable to obtain    PHYSICAL EXAM:  T(C): 37 (24 @ 12:32), Max: 37 (24 @ 12:32)  HR: 66 (24 @ 16:48) (62 - 94)  BP: 136/67 (24 @ 16:48) (111/73 - 136/67)  RR: 25 (24 @ 16:48) (18 - 25)  SpO2: 97% (24 @ 16:48) (94% - 98%)  Wt(kg): --  I&O's Summary      Appearance: Normal	  HEENT:   Normal oral mucosa, PERRL, EOMI	  Lymphatic: No lymphadenopathy  Cardiovascular: Normal S1 S2, No JVD, +murmurs, No edema  Respiratory: Lungs clear to auscultation	  Psychiatry: A & O x 3, Mood & affect appropriate  Gastrointestinal:  Soft, Non-tender, + BS	  Skin: No rashes, No ecchymoses, No cyanosis	  Neurologic: Non-focal  Extremities: Normal range of motion, No clubbing, cyanosis or edema  Vascular: Peripheral pulses palpable 2+ bilaterally    TELEMETRY: 	    ECG:  	  RADIOLOGY:  OTHER: 	  	  LABS:	 	    CARDIAC MARKERS:  CARDIAC MARKERS ( 2024 12:12 )  x     / x     / 33 U/L / x     / x                                  8.3    7.11  )-----------( 252      ( 2024 12:12 )             25.7         132<L>  |  93<L>  |  34<H>  ----------------------------<  198<H>  3.5   |  27  |  1.23    Ca    8.2<L>      2024 18:24  Phos  2.9       Mg     2.3         TPro  7.6  /  Alb  4.0  /  TBili  0.3  /  DBili  x   /  AST  13  /  ALT  19  /  AlkPhos  260<H>      proBNP:   Lipid Profile:   HgA1c:   TSH:       PREVIOUS DIAGNOSTIC TESTING:     < from: 12 Lead ECG (22 @ 11:48) >  Diagnosis Line MARKED SINUS BRADYCARDIA  LEFT AXIS DEVIATION  LEFT BUNDLE BRANCH BLOCK  ABNORMAL ECG  WHEN COMPARED WITH ECG OF 2022 17:54,  SIGNIFICANT CHANGES HAVE OCCURRED      < from: CT Chest w/ IV Cont (24 @ 17:41) >  -Patchy opacification of the lingula may reflect atelectasis or   infection. Left upper lobe 4 mm nodule, new since prior chest CT. also   correlation with repeat chest CT in one month, or sooner as clinically   warranted.    ABDOMEN/PELVIS:  -Stomach is partially distended with fluid. Duodenal thickening is noted   in the region of known ampullary/duodenal neoplasm. Correlate clinically   for any signs and symptoms of outlet obstruction.  -Pancreatic ampullary/duodenal neoplasm is better characterized on the   recent MRI and prior ERCP. Slight peripancreatic stranding for which   pancreatic head cannot be excluded. Correlate with lipase value and   clinical exam.  -CBD stent is filled with debris. Slight CBD enhancement above the stent.   Pneumobilia and gallbladder air are compatible with presence of the   stent. Gallbladder is mildly distended with slight wall thickening.   Correlate clinically for any signs and symptoms of cholecystitis and/or   cholangitis.    < from: Transthoracic Echocardiogram (22 @ 12:10) >  Normal left ventricular systolic function. No segmental  wall motion abnormalities.  Mild-moderate pulmonary hypertension.

## 2024-01-17 NOTE — H&P ADULT - HISTORY OF PRESENT ILLNESS
89F w/ PMH of HTN, GERD, pancreatic mass (inconclusive bx, s/p radiation 2023), presenting with n/v x 6 weeks and fall today at home. Pt's son at bedside assisting with history, has been living with patient for the last several months. Pt reports since beginning of Dec 2023, she has had intermittent nausea, and when she eats or drinks, she feels gas-like abdominal pain, burning like acid reflux, and then proceeds to vomit everything she ate or drank. Having difficulty keeping most solid food down, no difficulty at that time with liquids. Her sx progressively worsened to the point where she could not keep solid food in her stomach (would vomit the food she ate about 1 hour after ingesting it). Then she started having difficulty keeping liquids in her stomach. States she vomits liquids after about 1 hour; though sometimes can tolerate it for a few days.  Tried eating small meals but does not help. Pt is having normal BM, last BM yesterday. D/w her oncologist and was sent for MRI 3 weeks ago revealing increase in pancreatic mass size since 2022 (4.2x 3.3 cm on MRCP 12/21/23 ). Was supposed to have f/u PET scan but was told her blood sugar was too high to have scan. Weight loss of approx 10-15 lbs during this time. Today patient reports she was getting out of bed this morning and has baseline "balance issues" so she slid down to the floor. Son reports he found patient passed out on bedroom floor. Patient denies any pain from fall. Denies any CP, palpitations, SOB prior to fall or now. Denies any fever/chills, diarrhea, black/bloody stools, urinary symptoms, increased thirst, numbness/tingling, focal weakness, HA, vision changes, h/o diabetes.    In ED:   Vitals - Noted tachycardia, otherwise vitals stable  Labs - CBC w/ hgb  8.3; CMP w/ Cr 1.5, alk phos 260; trops at 144 vbg 7.55/co2 48; hco3 42  Imaging -  [facility] w/o pathology; CT C/AP w/ thickening around duodenal neoplasm & Gallbladder is mildly distended with slight wall thickening; also visualized CBD stent   Interventions - bolus x 2; potassium repletion

## 2024-01-17 NOTE — H&P ADULT - NSHPREVIEWOFSYSTEMS_GEN_ALL_CORE
REVIEW OF SYSTEMS:  CONSTITUTIONAL: No fever, chills, night sweats, or (+) fatigue  EYES: No eye pain, visual disturbances, or discharge  ENMT:  No difficulty hearing, tinnitus, vertigo; No sinus or throat pain  NECK: No pain or stiffness  RESPIRATORY: No cough, wheezing, or hemoptysis; No shortness of breath  CARDIOVASCULAR: No chest pain, palpitations, dizziness, or leg swelling  GASTROINTESTINAL: No abdominal or epigastric pain. (+) nausea, vomiting, or hematemesis; No diarrhea or constipation. No melena or hematochezia.  GENITOURINARY: No dysuria, frequency, hematuria, or incontinence  NEUROLOGICAL: No headaches, memory loss, loss of strength, numbness, or tremors  SKIN: No itching, burning, rashes, or lesions   LYMPH NODES: No enlarged glands  ENDOCRINE: No heat or cold intolerance; No hair loss  MUSCULOSKELETAL: No joint pain or swelling; No muscle, back, or extremity pain  PSYCHIATRIC: No depression, anxiety, mood swings, or difficulty sleeping  HEME/LYMPH: No easy bruising, or bleeding gums  ALLERGY AND IMMUNOLOGIC: No hives or eczema

## 2024-01-17 NOTE — H&P ADULT - NSHPPHYSICALEXAM_GEN_ALL_CORE
HEENT:   Normal oral mucosa, PERRL, EOMI	  Lymphatic: No lymphadenopathy  Cardiovascular: Normal S1 S2, No JVD, +murmurs, No edema  Respiratory: Lungs clear to auscultation	  Psychiatry: A & O x 3, Mood & affect appropriate  Gastrointestinal:  Soft, Non-tender, + BS	  Skin: No rashes, No ecchymoses, No cyanosis	  Neurologic: Non-focal  Extremities: Normal range of motion, No clubbing, cyanosis or edema  Vascular: Peripheral pulses palpable 2+ bilaterally

## 2024-01-17 NOTE — H&P ADULT - NSHPLABSRESULTS_GEN_ALL_CORE
01-17    132<L>  |  93<L>  |  34<H>  ----------------------------<  198<H>  3.5   |  27  |  1.23  01-17    139  |  91<L>  |  43<H>  ----------------------------<  252<H>  3.3<L>   |  36<H>  |  1.53<H>    Ca    8.2<L>      17 Jan 2024 18:24  Ca    10.1      17 Jan 2024 12:12  Phos  2.9     01-17  Mg     2.3     01-17    TPro  7.6  /  Alb  4.0  /  TBili  0.3  /  DBili  x   /  AST  13  /  ALT  19  /  AlkPhos  260<H>  01-17    Magnesium: 2.3 mg/dL (01-17-24 @ 12:12)    Phosphorus: 2.9 mg/dL (01-17-24 @ 12:12)                    Urinalysis Basic - ( 17 Jan 2024 18:24 )    Color: x / Appearance: x / SG: x / pH: x  Gluc: 198 mg/dL / Ketone: x  / Bili: x / Urobili: x   Blood: x / Protein: x / Nitrite: x   Leuk Esterase: x / RBC: x / WBC x   Sq Epi: x / Non Sq Epi: x / Bacteria: x                              8.3    7.11  )-----------( 252      ( 17 Jan 2024 12:12 )             25.7     CAPILLARY BLOOD GLUCOSE        Blood Gas Source Venous: Venous (01-17-24 @ 11:45)

## 2024-01-17 NOTE — ED ADULT NURSE NOTE - NSFALLHARMRISKINTERV_ED_ALL_ED

## 2024-01-17 NOTE — H&P ADULT - PROBLEM SELECTOR PLAN 2
-noted history of progressive dysphagia (solids) x 1 month  -still tolerating pills, liquids  > will order formal speech & swallow upon completion of procedures per GI

## 2024-01-17 NOTE — ED PROVIDER NOTE - PROGRESS NOTE DETAILS
HEAVEN completed and placed in chart. Patient with NSVT x 2, now NSR, given two doses of lidocaine 50mg IV. Pt reports feeling palpitations at the time, no CP, no SOB. - Viji Ibarra PA-C Patient again in WCT, per Dr. Harris pt given additional dose of lido 100mg IV, now in A.fib RVR with rate 110-120s. Spoke with cards fellow requesting Dr. Best be called as he followed patient during last admission. Dr. Best called for consult. - Viji Ibarra PA-C Pt now in NSR. Son reports patient is DNR/DNI however MOLST form left at home. MOLST completed today with patient and son and placed in chart. - Viji Ibarra PA-C Pt now in NSR. Son/HCP Jose Roberto Brito reports patient is DNR/DNI however MOLST form left at home. MOLST completed today with patient and son and placed in chart. - Viji Ibarra PA-C Patient with NSVT x 2, now NSR s/p two doses of lidocaine 50mg IV. Pt reports feeling palpitations at the time, no CP, no SOB. - Viji Ibarra PA-C Hardy Cevallos DO (PGY2)  Received signout on this patient.  89-year-old female has medical history of hypertension, GERD, pancreatic mass inconclusive status post radiation presenting with nausea vomiting for 6 weeks and syncopal episode.  Patient pending CT read to evaluate for pancreatic mass.  Was signed out that patient had concerns for 2 episodes of V. tach, was given 100 mg of lidocaine IV.  Patient had a slight episode of A-fib which now resolved.  Patient is receiving an normal sinus rhythm since.  Spoke with Dr. Best and who evaluated the patient during last admission, Dr. Best believes that the EKG does not seem like it was in V. tach.  Patient to be admitted to hospitalist upon CT read. To remain DNR as per earlier goals of care conversation with son who is healthcare proxy Attending MD Toro: Patient was seen by cardiology attending Dr. Best and I discussed case personally with him, he reviewed telemetry strip and believes that ventricular tachycardia unlikely.  More likely atrial flutter or A-fib.  Does not recommend any further antidysrhythmic at this time.  He will follow.

## 2024-01-17 NOTE — ED ADULT NURSE REASSESSMENT NOTE - NS ED NURSE REASSESS COMMENT FT1
Pt had episode of Vtach. RN in the room, MD made aware and print out of rhythm given to MD. MD at bedside, pt given lidocaine and placed on pads. Pt asymptomatic through episode. Pt states she "feels fast heart rate." Pt remains alert and oriented.

## 2024-01-17 NOTE — ED ADULT TRIAGE NOTE - CHIEF COMPLAINT QUOTE
vomiting since December  pt passed out this morning  frequent falls slipped out of the bed unknown loc  pt lives alone son

## 2024-01-17 NOTE — ED PROVIDER NOTE - OBJECTIVE STATEMENT
89yo F with PMH of HTN, GERD, pancreatic mass (inconclusive bx, s/p radiation 2023), presenting with n/v x 6 weeks and fall today at home. Pt's son at bedside assisting with history, has been living with patient for the last several months. Pt reports since beginning of Dec 2023, she has had intermittent nausea, and when she eats or drinks, she feels gas-like abdominal pain, burning like acid reflux, and then proceeds to vomit everything she ate or drank. Tried eating small meals but does not help. +flatulence and having normal BMs, last BM yesterday. D/w her oncologist and was sent for MRI 3 weeks ago revealing increase in pancreatic mass size since 2022. Was supposed to have f/u PET scan but was told her blood sugar was too high to have scan. Weight loss of approx 10-15 lbs during this time. Today patient reports she was getting out of bed this morning and has baseline "balance issues" so she slid down to the floor. Son reports he found patient passed out on bedroom floor. Patient denies any pain from fall. Denies any CP, palpitations, SOB prior to fall or now. Denies any fever/chills, diarrhea, black/bloody stools, urinary symptoms, increased thirst, numbness/tingling, focal weakness, HA, vision changes. 89yo F with PMH of HTN, GERD, pancreatic mass (inconclusive bx, s/p radiation 2023), presenting with n/v x 6 weeks and fall today at home. Pt's son at bedside assisting with history, has been living with patient for the last several months. Pt reports since beginning of Dec 2023, she has had intermittent nausea, and when she eats or drinks, she feels gas-like abdominal pain, burning like acid reflux, and then proceeds to vomit everything she ate or drank. Tried eating small meals but does not help. +flatulence and having normal BMs, last BM yesterday. D/w her oncologist and was sent for MRI 3 weeks ago revealing increase in pancreatic mass size since 2022. Was supposed to have f/u PET scan but was told her blood sugar was too high to have scan. Weight loss of approx 10-15 lbs during this time. Today patient reports she was getting out of bed this morning and has baseline "balance issues" so she slid down to the floor. Son reports he found patient passed out on bedroom floor. Patient denies any pain from fall. Denies any CP, palpitations, SOB prior to fall or now. Denies any fever/chills, diarrhea, black/bloody stools, urinary symptoms, increased thirst, numbness/tingling, focal weakness, HA, vision changes, h/o diabetes.

## 2024-01-17 NOTE — ED PROVIDER NOTE - NS ED ATTENDING STATEMENT MOD
This was a shared visit with the TOMI. I reviewed and verified the documentation. I have personally provided the amount of critical care time documented below excluding time spent on separate procedures.

## 2024-01-17 NOTE — ED PROVIDER NOTE - CARE PLAN
1 Principal Discharge DX:	Nausea & vomiting   Principal Discharge DX:	Nausea & vomiting  Secondary Diagnosis:	Pancreatic mass  Secondary Diagnosis:	Dysphagia  Secondary Diagnosis:	Fall  Secondary Diagnosis:	Metabolic alkalosis  Secondary Diagnosis:	Hypokalemia  Secondary Diagnosis:	Tachyarrhythmia

## 2024-01-17 NOTE — H&P ADULT - PROBLEM SELECTOR PLAN 1
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

## 2024-01-17 NOTE — CONSULT NOTE ADULT - ASSESSMENT
# Nausea/vomiting   # Duodenal mass    Recommendations:  - No urgent indication for endoscopic intervention at this time  - Follow up CTAP, r/o obstruction   - NPO after midnight in case of endoscopy indicated  - Antiemetics asn needed  - 3 AM CBC, CMP, coags; correct electrolytes  - Transfuse if Hgb < 7  - Ensure adequate hydration  - Rest of care per ED    Preliminary note until signed by Attending.    Thank you for involving us in this patient's care. Please reach out if any further questions or concerns.    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 Patient now presenting with progressive nausea and NBNB vomiting since December, initially with solids now liquids. Advanced GI consulted for further evaluation.    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 Patient does not appear to be completely obstructed, but may benefit from further imaging.     Recommendations:  - No urgent indication for endoscopic intervention at this time  - NPO pending CTAP  - Follow up CTAP, r/o obstruction   - If ongoing n/v or abdominal distention, consider NGT placement  - NPO after midnight in case of endoscopy indicated  - Antiemetics as needed  - PPI 40 IV BID  - 3 AM CBC, CMP, coags; correct electrolytes  - Transfuse if Hgb < 7  - Ensure adequate hydration  - Follow up Onc  - Cardiac evaluation if tachycardia persists  - Rest of care per ED    Preliminary note until signed by Attending.    Thank you for involving us in this patient's care. Please reach out if any further questions or concerns.    Cait Farrar MD  Gastroenterology/Hepatology Fellow, PGY-7    NON-URGENT CONSULTS:  Please email [email or web address]  AT NIGHT AND ON WEEKENDS:  Contact on-call GI fellow via answering service ([phone]) from 5pm-8am and on weekends/holidays  MONDAY-FRIDAY 8AM-5PM:  Pager: [phone] ([facility])  Pager: [phone] ([facility])

## 2024-01-17 NOTE — H&P ADULT - TIME BILLING
Chart review , case discussion with other providers , obtain history   examination of patient , answering questions and concerns , reviewing/ ordering labs and medications , and documentation

## 2024-01-17 NOTE — H&P ADULT - ASSESSMENT
89F w/ PMH of HTN, GERD, pancreatic mass (inconclusive bx, s/p radiation 2023), presenting with n/v x 6 weeks and fall today at home. Pt reports since beginning of Dec 2023, she has had intermittent nausea, and when she eats or drinks, she feels gas-like abdominal pain, burning like acid reflux, and then proceeds to vomit everything she ate or drank. D/w her oncologist and was sent for MRI 3 weeks ago revealing increase in pancreatic mass size since 2022 (4.2x 3.3 cm on MRCP 12/21/23 ). Today patient reports she was getting out of bed this morning and has baseline "balance issues" so she slid down to the floor. Admitted for vomiting & fall.

## 2024-01-17 NOTE — H&P ADULT - PROBLEM SELECTOR PLAN 7
-DVT Ppx: SCD (until anemia r/o)  -Diet: NPO (procedure)  -PRNs:   -Code Status: DNR/DNI (molst in chart)   -Communications: MED REC BASED ON CHART REVIEW; WILL NEED AM MED REC  -Dispo:

## 2024-01-17 NOTE — H&P ADULT - PROBLEM SELECTOR PLAN 3
-normocytic anemia, likely in setting of malignancy  > order microcytic, normocytic anemia work up     > iron, ferritin, transferrin     > LDH, hapto, t/d bili  > transfuse > hgb 7

## 2024-01-17 NOTE — H&P ADULT - ATTENDING COMMENTS
89yo F with PMH of HTN, GERD, pancreatic/duodenal mass invading the ampulla s/p ERCP 7/25/22 with placement of fully covered stent for biliary obstruction, s/p RT, p/w ongoing progressive solid dysphagia a/w recurrent n/v , tolerating liquids , currently afebrile. HD stable ,  labs w/ anemia , Scr 1.5 , CT AP w/ Duodenal thickening is noted in the region of known ampullary/duodenal neoplasm, patient evaluated by GI; will keep npo  in anticipation for possible GI intervention , IV PPI bid , simethicone prn , anti-emetics prn. Cardiology consult appreciated and recommendations noted.

## 2024-01-17 NOTE — CONSULT NOTE ADULT - SUBJECTIVE AND OBJECTIVE BOX
Chief Complaint:  Patient is a 89y old  Female who presents with a chief complaint of     HPI:SONJA WOOD is a 89y Female    PMHX/PSHX:  HLD (hyperlipidemia)    GERD (gastroesophageal reflux disease)    HTN (hypertension)    H/O:       Allergies:  tetracaine (Unknown)  penicillin (Unknown)      Home Medications: reviewed  Hospital Medications:  potassium chloride  10 mEq/100 mL IVPB 10 milliEquivalent(s) IV Intermittent every 1 hour      Social History:   Tob: Denies  EtOH: Denies  Illicit Drugs: Denies    Family history:  Family history of diabetes mellitus (DM) (Mother)    FH: liver cancer (Child)      Denies family history of colon cancer/polyps, stomach cancer/polyps, pancreatic cancer/masses, liver cancer/disease, ovarian cancer and endometrial cancer.    ROS:   General:  No  fevers, chills, night sweats, fatigue  Eyes:  Good vision, no reported pain  ENT:  No sore throat, pain, runny nose  CV:  No pain, palpitations  Pulm:  No dyspnea, cough  GI:  See HPI, otherwise negative  :  No  incontinence, nocturia  Muscle:  No pain, weakness  Neuro:  No memory problems  Psych:  No insomnia, mood problems, depression  Endocrine:  No polyuria, polydipsia, cold/heat intolerance  Heme:  No petechiae, ecchymosis, easy bruisability  Skin:  No rash    PHYSICAL EXAM:   Vital Signs:  Vital Signs Last 24 Hrs  T(C): 37 (2024 12:32), Max: 37 (2024 12:32)  T(F): 98.6 (2024 12:32), Max: 98.6 (2024 12:32)  HR: 73 (2024 12:32) (73 - 94)  BP: 111/73 (2024 12:32) (111/73 - 112/72)  BP(mean): --  RR: 18 (2024 12:32) (18 - 18)  SpO2: 98% (2024 12:32) (97% - 98%)    Parameters below as of 2024 12:32  Patient On (Oxygen Delivery Method): room air      Daily Height in cm: 160.02 (2024 10:10)    Daily     GENERAL: no acute distress  NEURO: alert  HEENT: anicteric sclera, no conjunctival pallor appreciated  CHEST: no respiratory distress, no accessory muscle use  CARDIAC: regular rate, rhythm  ABDOMEN: soft, non-tender, non-distended, no rebound or guarding  EXTREMITIES: warm, well perfused, no edema  SKIN: no lesions noted    LABS:                          8.3    7.11  )-----------( 252      ( 2024 12:12 )             25.7         139  |  91<L>  |  43<H>  ----------------------------<  252<H>  3.3<L>   |  36<H>  |  1.53<H>    Ca    10.1      2024 12:12  Phos  2.9       Mg     2.3         TPro  7.6  /  Alb  4.0  /  TBili  0.3  /  DBili  x   /  AST  13  /  ALT  19  /  AlkPhos  260<H>      LIVER FUNCTIONS - ( 2024 12:12 )  Alb: 4.0 g/dL / Pro: 7.6 g/dL / ALK PHOS: 260 U/L / ALT: 19 U/L / AST: 13 U/L / GGT: x               Diagnostic Studies: see sunrise for full report         Chief Complaint:  Patient is a 89y old  Female who presents with a chief complaint of     HPI:SONJA WOOD is a 89y Female with GERD, known duodenal mass invading the ampulla s/p ERCP 22 with placement of fully covered stent for biliary obstruction with bx reporting tubular adenoma with focal high-grade dysplasia, declined surgery, opted for RT, completed, noted to have increased mass size to 4.2x 3.3 cm on MRCP 23 since 2022 with plans for repeat PET/CT outpatient, but canceled due to elevated glucose. Patient now presenting with progressive nausea and NBNB vomiting since December, initially with solids now liquids. Advanced GI consulted for further evaluation.    Patient states in December she started having difficulty keeping most solid food down, no difficulty at that time with liquids. Her sx progressively worsened to the point where she could not keep solid food in her stomach (would vomit the food she ate about 1 hour after ingesting it). Then she started having difficulty keeping liquids in her stomach. States she vomits liquids after about 1 hour; though sometimes can tolerate it for a few days. Denies painful or difficulty swallowing. States she has gas pains in her abdomen, but otherwise no discomfort, bloating, or distention. No diarrhea. Denies constipation, but states that her BMs are progressively getting harder in consistency. Last BM she states was this AM and is passing flatus. Denies bloody red/black stools or vomiting blood. Has increased reflux. Has had 15+ weight loss in the past year.     Note: PET/CT dated  reported the biliary stent unchanged in position with pneumobilia unchanged. 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 Resolution of pericholecystic FDG activity and no distant sites of abnormal FDG avidity to suggest metastatic disease.    Patient has been stable in the ED, but has had intermittent episodes of tachycardia; some self resolved with breathing exercises (states she is anxious) and also given lido for other episodes. BUN/Cr stable from .  previously 300s in 10/2023 and bili wnl. AST/ALT wnl. Glucose 200s. Denies history of DM.     PMHX/PSHX:  HLD (hyperlipidemia)    GERD (gastroesophageal reflux disease)    HTN (hypertension)    H/O:       Allergies:  tetracaine (Unknown)  penicillin (Unknown)      Home Medications: reviewed  Hospital Medications:  potassium chloride  10 mEq/100 mL IVPB 10 milliEquivalent(s) IV Intermittent every 1 hour      Social History:   Tob: Denies  EtOH: Denies  Illicit Drugs: Denies    Family history:  Family history of diabetes mellitus (DM) (Mother)    FH: liver cancer (Child)       ROS:   Complete and normal except as mentioned above.    PHYSICAL EXAM:   Vital Signs:  Vital Signs Last 24 Hrs  T(C): 37 (2024 12:32), Max: 37 (2024 12:32)  T(F): 98.6 (2024 12:32), Max: 98.6 (2024 12:32)  HR: 73 (2024 12:32) (73 - 94)  BP: 111/73 (2024 12:32) (111/73 - 112/72)  BP(mean): --  RR: 18 (2024 12:32) (18 - 18)  SpO2: 98% (2024 12:32) (97% - 98%)    Parameters below as of 2024 12:32  Patient On (Oxygen Delivery Method): room air      Daily Height in cm: 160.02 (2024 10:10)    Daily     GENERAL: no acute distress  NEURO: aox3  HEENT: anicteric sclera, no conjunctival pallor appreciated  CHEST: no respiratory distress, no accessory muscle use  CARDIAC: regular rate, rhythm  ABDOMEN: soft, non-tender, non-distended, no rebound or guarding  EXTREMITIES: warm, well perfused, +edema  SKIN: no lesions noted    LABS:                          8.3    7.11  )-----------( 252      ( 2024 12:12 )             25.7     -17    139  |  91<L>  |  43<H>  ----------------------------<  252<H>  3.3<L>   |  36<H>  |  1.53<H>    Ca    10.1      2024 12:12  Phos  2.9       Mg     2.3         TPro  7.6  /  Alb  4.0  /  TBili  0.3  /  DBili  x   /  AST  13  /  ALT  19  /  AlkPhos  260<H>  01-17    LIVER FUNCTIONS - ( 2024 12:12 )  Alb: 4.0 g/dL / Pro: 7.6 g/dL / ALK PHOS: 260 U/L / ALT: 19 U/L / AST: 13 U/L / GGT: x               Diagnostic Studies:  MR MRCP WAW IC  - ORDERED BY:  JENNIFER BROWNING      PROCEDURE DATE:  2023           INTERPRETATION:  CLINICAL INFORMATION: Surveillance for ampullary lesion.    COMPARISON: PET/CT 2023 and 2022 and MRI abdomen 2022. CT   abdomen/pelvis 2022    CONTRAST/COMPLICATIONS:  IV Contrast: Gadavist  6 cc administered   1.5 cc discarded  Oral Contrast: NONE  Complications: None reported at time of study completion    PROCEDURE:  MRI of the abdomen was performed.  MRCP was performed.    FINDINGS:  LOWER CHEST: Within normal limits.    LIVER: Arterial foci of peripheral enhancement, likely perfusional. No   focal hepatic lesion.  BILE DUCTS: Metallic common bile duct stent. Dilated common bile duct   measuring 1.5 cm. Pneumobilia. Intrahepatic biliary ductal dilatation.  GALLBLADDER: Air in the gallbladder. Additional low T2 signal material,   likely sludge.  SPLEEN: Within normal limits.  PANCREAS: Dilated main pancreatic duct with associated pancreatic atrophy   measuring 9 mm, previously 4 mm on 2022; duct cut off in the   pancreatic head in the region of the ampulla. There is also a pancreas   divisum with a communicating dorsal duct which drains into the minor   patella. Enhancement in theregion of the pancreatic head which surrounds   the common bile duct and main pancreatic duct measuring 4.2 x 3.2 cm   which demonstrates restricted diffusion which may all be a mass, however   some of which may reflect inflammation; comparison with the prior PET/CTs   is limited and the prior MRI was performed without intravenous contrast,   however the ampullary mass has likely increased since 2022. There is   also restricted diffusion involving the remainder of the pancreas which   may reflect pancreatitis. Low T2 signal foci in the downstream pancreatic   duct in the pancreatic head on the MRCP sequences, possibly debris or air.  ADRENALS: Within normal limits.  KIDNEYS/URETERS: No hydronephrosis. Bilateral renal cysts.    VISUALIZEDPORTIONS:  BOWEL: Normal caliber.  PERITONEUM: No ascites.  VESSELS: Atherosclerotic changes. IVC is unremarkable. Narrowing of the   confluence of the portal and superior mesenteric veins in the region of   the pancreatic head.  RETROPERITONEUM/LYMPH NODES: No lymphadenopathy.  ABDOMINAL WALL: Unremarkable.  BONES: No aggressive osseous lesion.    IMPRESSION:    Pancreatic ductal dilatation measuring 9 mm, increased since 2022,   previously 4 mm with pancreatic duct cut off in the region ofthe ampulla   with enhancement surrounding the pancreatic duct and common bile duct in   the pancreatic head measuring 4.2 x 3.3 cm suspicious for a mass which   has increased in size since MRI abdomen 2022, however some of the   enhancement maybe inflammatory; comparison with the more recent prior   PET/CTs is limited secondary to differences in modality and a follow-up   PET/CT should be considered.    Narrowing of the confluence of the portal and superior mesenteric veins   in the regionof the pancreatic head.    Restricted diffusion involving the remainder of the pancreas which may   reflect pancreatitis; correlation is recommended with pancreatic enzymes    Metallic common bile duct with pneumobilia and dilated common bile duct   measuring 1.5 cm and intrahepatic biliary ductal dilatation.    The findings were discussed with Dr. Browning  on 2023 12:48 PM    --- End of Report ---               JODY PHILIPPE MD; Attending Radiologist   This document has been electronically signed. Dec 29 2023  1:03PM

## 2024-01-17 NOTE — CONSULT NOTE ADULT - ATTENDING COMMENTS
As above.    Patient seen and examined on 1/17/2024 in the early evening.    Impression:    #1.  Patient admitted and we are consulted for recurrent nausea and vomiting with weight loss.    #2.  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  LFTs currently normal observe for elevated alkaline phosphatase.  Current CT scan and recent MRI demonstrates 4 cm duodenal/pancreatic head mass    #3.  At least partial gastric outlet obstruction demonstrated by CT scan.  Personal review of images demonstrate distended fluid-filled stomach and fluid in the duodenum up to the duodenal sweep/proximal second portion of the duodenum.  No significant fluid in the distal duodenum    Recommendations:    #1.  Follow CBC/comprehensive metabolic panel    #2.  NG tube to low intermittent suction    #3.  N.p.o. for upper endoscopy/possible duodenal stent placement on 1/18/2024.    #4.  Will need to temporarily suspend DNR/DNI order in the procedural and periprocedural timeframe.

## 2024-01-17 NOTE — ED ADULT NURSE NOTE - OBJECTIVE STATEMENT
89yo F with PMH of HTN, GERD, pancreatic mass (inconclusive bx, s/p radiation 2023), presenting with n/v x 6 weeks and fall today at home. Pt's son at bedside assisting with history, has been living with patient for the last several months. Pt reports since beginning of Dec 2023, she has had intermittent nausea, and when she eats or drinks, she feels gas-like abdominal pain, burning like acid reflux, and then proceeds to vomit everything she ate or drank. Tried eating small meals but does not help. +flatulence and having normal BMs, last BM yesterday. D/w her oncologist and was sent for MRI 3 weeks ago revealing increase in pancreatic mass size since 2022. Was supposed to have f/u PET scan but was told her blood sugar was too high to have scan. Weight loss of approx 10-15 lbs during this time. Today patient reports she was getting out of bed this morning and has baseline "balance issues" so she slid down to the floor. Son reports he found patient passed out on bedroom floor. Patient denies any pain from fall. Denies any CP, palpitations, SOB prior to fall or now. Denies any fever/chills, diarrhea, black/bloody stools, urinary symptoms, increased thirst, numbness/tingling, focal weakness, HA, vision changes, h/o diabetes.

## 2024-01-18 NOTE — PROGRESS NOTE ADULT - SUBJECTIVE AND OBJECTIVE BOX
denies nausea. last vomiting episode on 1/14.  son at bedside.    GENERAL: No fevers, no chills.  EYES: No blurry vision,  No photophobia  ENT: No sore throat.  No dysphagia  Cardiovascular: No chest pain, palpitations, orthopnea  Pulmonary: No cough, no wheezing. No shortness of breath  Gastrointestinal: No abdominal pain, no diarrhea, no constipation.   Musculoskeletal: No weakness.  No myalgias.  Dermatology:  No rashes.  Neuro: No Headache.  No vertigo.  No dizziness.  Psych: No anxiety, no depression.  Denies suicidal thoughts.    MEDICATIONS  (STANDING):  atorvastatin 10 milliGRAM(s) Oral at bedtime  gabapentin 100 milliGRAM(s) Oral two times a day  lactated ringers. 1000 milliLiter(s) (70 mL/Hr) IV Continuous <Continuous>  melatonin 3 milliGRAM(s) Oral at bedtime  metoprolol succinate ER 50 milliGRAM(s) Oral daily  pantoprazole  Injectable 40 milliGRAM(s) IV Push every 12 hours    MEDICATIONS  (PRN):  ALPRAZolam 0.25 milliGRAM(s) Oral at bedtime PRN for anxiety  aluminum hydroxide/magnesium hydroxide/simethicone Suspension 30 milliLiter(s) Oral every 4 hours PRN Dyspepsia  simethicone 80 milliGRAM(s) Chew every 6 hours PRN Upset Stomach    Vital Signs Last 24 Hrs  T(C): 36.9 (18 Jan 2024 04:57), Max: 37 (18 Jan 2024 03:48)  T(F): 98.4 (18 Jan 2024 04:57), Max: 98.6 (18 Jan 2024 03:48)  HR: 67 (18 Jan 2024 04:57) (62 - 78)  BP: 128/65 (18 Jan 2024 04:57) (118/57 - 139/66)  BP(mean): --  RR: 18 (18 Jan 2024 04:57) (18 - 25)  SpO2: 93% (18 Jan 2024 04:57) (93% - 97%)    Parameters below as of 18 Jan 2024 04:57  Patient On (Oxygen Delivery Method): room air    GENERAL: NAD, anxious  HEAD:  Atraumatic, Normocephalic  EYES: EOMI, PERRLA, conjunctiva and sclera clear  ENT: Pharynx not erythematous  PULMONARY: Clear to auscultation bilaterally; No wheeze  CARDIOVASCULAR: Regular rate and rhythm; No murmurs, rubs, or gallops  ABDOMEN: Soft, Nontender, Nondistended; Bowel sounds present  EXTREMITIES:  2+ Peripheral Pulses, No clubbing, cyanosis, or edema  MUSCULOSKELETAL: No calf tenderness  PSYCH: AAOx3, normal affect  SKIN: warm and dry, No rashes or lesions    .  LABS:                         7.4    6.88  )-----------( 213      ( 18 Jan 2024 03:30 )             23.3     01-18    141  |  103  |  30<H>  ----------------------------<  166<H>  4.5   |  28  |  1.25    Ca    9.1      18 Jan 2024 03:23  Phos  2.3     01-18  Mg     2.0     01-18    TPro  6.6  /  Alb  3.4  /  TBili  0.3  /  DBili  0.1  /  AST  15  /  ALT  15  /  AlkPhos  237<H>  01-18    PT/INR - ( 18 Jan 2024 03:25 )   PT: 12.8 sec;   INR: 1.17 ratio         PTT - ( 18 Jan 2024 03:25 )  PTT:31.0 sec  Urinalysis Basic - ( 18 Jan 2024 03:23 )    Color: x / Appearance: x / SG: x / pH: x  Gluc: 166 mg/dL / Ketone: x  / Bili: x / Urobili: x   Blood: x / Protein: x / Nitrite: x   Leuk Esterase: x / RBC: x / WBC x   Sq Epi: x / Non Sq Epi: x / Bacteria: x            RADIOLOGY, EKG & ADDITIONAL TESTS: Reviewed.

## 2024-01-18 NOTE — CONSULT NOTE ADULT - SUBJECTIVE AND OBJECTIVE BOX
HPI:  89F w/ PMH of HTN, GERD, pancreatic mass (inconclusive bx, s/p radiation ), presenting with n/v x 6 weeks and fall today at home. Pt's son at bedside assisting with history, has been living with patient for the last several months. Pt reports since beginning of Dec 2023, she has had intermittent nausea, and when she eats or drinks, she feels gas-like abdominal pain, burning like acid reflux, and then proceeds to vomit everything she ate or drank. Having difficulty keeping most solid food down, no difficulty at that time with liquids. Her sx progressively worsened to the point where she could not keep solid food in her stomach (would vomit the food she ate about 1 hour after ingesting it). Then she started having difficulty keeping liquids in her stomach. States she vomits liquids after about 1 hour; though sometimes can tolerate it for a few days.  Tried eating small meals but does not help. Pt is having normal BM, last BM yesterday. D/w her oncologist and was sent for MRI 3 weeks ago revealing increase in pancreatic mass size since  (4.2x 3.3 cm on MRCP 23 ). Was supposed to have f/u PET scan but was told her blood sugar was too high to have scan. Weight loss of approx 10-15 lbs during this time. Today patient reports she was getting out of bed this morning and has baseline "balance issues" so she slid down to the floor. Son reports he found patient passed out on bedroom floor. Patient denies any pain from fall. Denies any CP, palpitations, SOB prior to fall or now. Denies any fever/chills, diarrhea, black/bloody stools, urinary symptoms, increased thirst, numbness/tingling, focal weakness, HA, vision changes, h/o diabetes.    In ED:   Vitals - Noted tachycardia, otherwise vitals stable  Labs - CBC w/ hgb  8.3; CMP w/ Cr 1.5, alk phos 260; trops at 144 vbg 7.55/co2 48; hco3 42  Imaging -  CT w/o pathology; CT C/AP w/ thickening around duodenal neoplasm & Gallbladder is mildly distended with slight wall thickening; also visualized CBD stent   Interventions - bolus x 2; potassium repletion (2024 23:09)      14 point ROS otherwise negative    PAST MEDICAL & SURGICAL HISTORY:  HLD (hyperlipidemia)      GERD (gastroesophageal reflux disease)      HTN (hypertension)      H/O:   x2          Allergies    tetracaine (Unknown)  penicillin (Unknown)    Intolerances        MEDICATIONS  (STANDING):  atorvastatin 10 milliGRAM(s) Oral at bedtime  gabapentin 100 milliGRAM(s) Oral two times a day  lactated ringers. 1000 milliLiter(s) (70 mL/Hr) IV Continuous <Continuous>  melatonin 3 milliGRAM(s) Oral at bedtime  metoprolol succinate ER 50 milliGRAM(s) Oral daily  pantoprazole  Injectable 40 milliGRAM(s) IV Push every 12 hours  sodium chloride 0.9%. 500 milliLiter(s) (30 mL/Hr) IV Continuous <Continuous>    MEDICATIONS  (PRN):  ALPRAZolam 0.25 milliGRAM(s) Oral at bedtime PRN for anxiety  aluminum hydroxide/magnesium hydroxide/simethicone Suspension 30 milliLiter(s) Oral every 4 hours PRN Dyspepsia  simethicone 80 milliGRAM(s) Chew every 6 hours PRN Upset Stomach      FAMILY HISTORY:  Family history of diabetes mellitus (DM) (Mother)    FH: liver cancer (Child)        SOCIAL HISTORY: No EtOH, no tobacco    Height (cm): 160 ( @ 14:44)  Weight (kg): 53.1 ( @ :44)  BMI (kg/m2): 20.7 ( @ :44)  BSA (m2): 1.54 ( @ 14:44)    VITALS:   T(F): 98.5 (24 @ 14:44), Max: 98.6 (24 @ 03:48)  HR: 57 (24 @ 14:44)  BP: 137/64 (24 @ 14:44)  RR: 18 (24 @ 14:44)  SpO2: 100% (24 @ :44)  Wt(kg): --    PHYSICAL EXAM    GENERAL: NAD, well-developed  HEAD:  Atraumatic, Normocephalic  EYES: EOMI, PERRLA, conjunctiva and sclera clear  NECK: Supple, No JVD  CHEST/LUNG: Clear to auscultation bilaterally; No wheeze  HEART: Regular rate and rhythm; No murmurs, rubs, or gallops  ABDOMEN: Soft, Nontender, Nondistended; Bowel sounds present  EXTREMITIES:  2+ Peripheral Pulses, No clubbing, cyanosis, or edema  NEUROLOGY: non-focal  SKIN: No rashes or lesions    LABS:                         7.4    6.88  )-----------( 213      ( 2024 03:30 )             23.3         141  |  103  |  30<H>  ----------------------------<  166<H>  4.5   |  28  |  1.25    Ca    9.1      2024 03:23  Phos  2.3       Mg     2.0         TPro  6.6  /  Alb  3.4  /  TBili  0.3  /  DBili  0.1  /  AST  15  /  ALT  15  /  AlkPhos  237<H>      Magnesium: 2.0 mg/dL ( @ 03:23)  Phosphorus: 2.3 mg/dL ( @ 03:23)    PT/INR - ( 2024 03:25 )   PT: 12.8 sec;   INR: 1.17 ratio         PTT - ( 2024 03:25 )  PTT:31.0 sec      IMAGING:

## 2024-01-18 NOTE — SWALLOW BEDSIDE ASSESSMENT ADULT - COMMENTS
Continued history:   Known GERD (gastroesophageal reflux disease).  c/w home PPI.    Imaging   -[facility] w/o pathology; CT C/AP w/ thickening around duodenal neoplasm & Gallbladder is mildly distended with slight wall thickening; also visualized CBD stent     Speech & Swallow; no reports in SCM

## 2024-01-18 NOTE — SWALLOW BEDSIDE ASSESSMENT ADULT - SWALLOW EVAL: DIAGNOSIS
Received order. D/w NP Germania regarding NPO status and noted plan for GI intervention. In agreement to defer at this time pending GI procedure. SLP to remain on consult pending interventions/clearance from team. Carline Duque MS CCC-SLP Prefer teams   extension [phone]

## 2024-01-18 NOTE — PROGRESS NOTE ADULT - SUBJECTIVE AND OBJECTIVE BOX
Date of Service: 01-18-24 @ 07:56           CARDIOLOGY     PROGRESS  NOTE   ________________________________________________    CHIEF COMPLAINT:Patient is a 89y old  Female who presents with a chief complaint of Vomiting (17 Jan 2024 23:09)  comfortable  	  REVIEW OF SYSTEMS:  CONSTITUTIONAL: No fever, weight loss, or fatigue  EYES: No eye pain, visual disturbances, or discharge  ENT:  No difficulty hearing, tinnitus, vertigo; No sinus or throat pain  NECK: No pain or stiffness  RESPIRATORY: No cough, wheezing, chills or hemoptysis; No Shortness of Breath  CARDIOVASCULAR: No chest pain, palpitations, passing out, dizziness, or leg swelling  GASTROINTESTINAL: No abdominal or epigastric pain. No nausea, vomiting, or hematemesis; No diarrhea or constipation. No melena or hematochezia.  GENITOURINARY: No dysuria, frequency, hematuria, or incontinence  NEUROLOGICAL: No headaches, memory loss, loss of strength, numbness, or tremors  SKIN: No itching, burning, rashes, or lesions   LYMPH Nodes: No enlarged glands  ENDOCRINE: No heat or cold intolerance; No hair loss  MUSCULOSKELETAL: No joint pain or swelling; No muscle, back, or extremity pain  PSYCHIATRIC: No depression, anxiety, mood swings, or difficulty sleeping  HEME/LYMPH: No easy bruising, or bleeding gums  ALLERGY AND IMMUNOLOGIC: No hives or eczema	    x[ ] All others negative	  [ ] Unable to obtain    PHYSICAL EXAM:  T(C): 36.9 (01-18-24 @ 04:57), Max: 37 (01-17-24 @ 12:32)  HR: 67 (01-18-24 @ 04:57) (62 - 94)  BP: 128/65 (01-18-24 @ 04:57) (111/73 - 139/66)  RR: 18 (01-18-24 @ 04:57) (18 - 25)  SpO2: 93% (01-18-24 @ 04:57) (93% - 98%)  Wt(kg): --  I&O's Summary      Appearance: Normal	  HEENT:   Normal oral mucosa, PERRL, EOMI	  Lymphatic: No lymphadenopathy  Cardiovascular: Normal S1 S2, No JVD, + murmurs, No edema  Respiratoryrhonchi  Psychiatry: A & O x 3, Mood & affect appropriate  Gastrointestinal:  Soft, Non-tender, + BS	  Skin: No rashes, No ecchymoses, No cyanosis	  Neurologic: Non-focal  Extremities: Normal range of motion, No clubbing, cyanosis or edema  Vascular: Peripheral pulses palpable 2+ bilaterally    MEDICATIONS  (STANDING):  atorvastatin 10 milliGRAM(s) Oral at bedtime  gabapentin 100 milliGRAM(s) Oral two times a day  lactated ringers. 1000 milliLiter(s) (70 mL/Hr) IV Continuous <Continuous>  melatonin 3 milliGRAM(s) Oral at bedtime  metoprolol succinate ER 50 milliGRAM(s) Oral daily  pantoprazole  Injectable 40 milliGRAM(s) IV Push every 12 hours      TELEMETRY: 	    ECG:  	  RADIOLOGY:  OTHER: 	  	  LABS:	 	    CARDIAC MARKERS:  CARDIAC MARKERS ( 17 Jan 2024 12:12 )  x     / x     / 33 U/L / x     / x                                    7.4    6.88  )-----------( 213      ( 18 Jan 2024 03:30 )             23.3     01-18    141  |  103  |  30<H>  ----------------------------<  166<H>  4.5   |  28  |  1.25    Ca    9.1      18 Jan 2024 03:23  Phos  2.3     01-18  Mg     2.0     01-18    TPro  6.6  /  Alb  3.4  /  TBili  0.3  /  DBili  0.1  /  AST  15  /  ALT  15  /  AlkPhos  237<H>  01-18    proBNP:   Lipid Profile:   HgA1c:   TSH:   PT/INR - ( 18 Jan 2024 03:25 )   PT: 12.8 sec;   INR: 1.17 ratio         PTT - ( 18 Jan 2024 03:25 )  PTT:31.0 sec      Assessment and plan  ---------------------------  89yo F with PMH of HTN, GERD, pancreatic mass (inconclusive bx, s/p radiation 2023), presenting with n/v x 6 weeks and fall today at home. Pt's son at bedside assisting with history, has been living with patient for the last several months. Pt reports since beginning of Dec 2023, she has had intermittent nausea, and when she eats or drinks, she feels gas-like abdominal pain, burning like acid reflux, and then proceeds to vomit everything she ate or drank. Tried eating small meals but does not help. +flatulence and having normal BMs, last BM yesterday. D/w her oncologist and was sent for MRI 3 weeks ago revealing increase in pancreatic mass size since 2022. Was supposed to have f/u PET scan but was told her blood sugar was too high to have scan. Weight loss of approx 10-15 lbs during this time. Today patient reports she was getting out of bed this morning and has baseline "balance issues" so she slid down to the floor. Son reports he found patient passed out on bedroom floor. Patient denies any pain from fall. Denies any CP, palpitations, SOB prior to fall or now. Denies any fever/chills, diarrhea, black/bloody stools, urinary symptoms, increased thirst, numbness/tingling, focal weakness, HA, vision changes, h/o diabetes.  is well known to me with hx of abdominal mass with nausea anmd vomiting, with tachycardia  pt is been c/o intermittent palpitation at home  beta blocker as tolerated, if recurrent will start on amio  check electrolytes  dvt prophylaxis  keep hgb>8

## 2024-01-18 NOTE — PROGRESS NOTE ADULT - PROBLEM SELECTOR PLAN 1
- currently denies nausea, will hold off on NGT placement  - pending EGD with possible duodenal stent placement  - NPO for now   - s/p radiation 2023, s/p ERCP 7/25/22 with placement of fully covered stent for biliary obstruction  - mass 4.2x 3.3 cm on MRCP 12/21/23  - unable to obtain PET d/t hyperglycemia - will need NGT placed to decompress the stomach prior to EGD-- discussed with GI fellow and medicine ACP, will place now  - pending EGD with possible duodenal stent placement  - NPO for now   - s/p radiation 2023, s/p ERCP 7/25/22 with placement of fully covered stent for biliary obstruction  - mass 4.2x 3.3 cm on MRCP 12/21/23  - unable to obtain PET d/t hyperglycemia

## 2024-01-18 NOTE — DISCHARGE NOTE FOR THE EXPIRED PATIENT - HOSPITAL COURSE
Patient is a 89 year old female presenting to [facility] ED with nausea, vomiting and fall. CT Head nonccute. Patient has pancreatic mass. Started on IV PPI BID, GI called planning for endoscopy. Patient made NPO. Patient hemoglobin in AM decreased to 7.3 from 8.4, 1 unit of packed red blood cells transfused as per advanced GI service recommendations. Heme/onc consult recommends pancreatic mass biopsy. Patient sent up to endoscopy suite and was speaking to anesthesiology when she spontaneously went into vfib arrest. A code was called, rapid response team at bedside. Pt was ambu and code cart was brought to bedside. We were told that pt was DNR/DNI and we stopped intervening.  Pt was pronounced dead. Family called. Medical attending Dr. Alexandra informed. Patient is a 89 year old female presenting to [facility] ED with nausea, vomiting and fall. CT Head nonacute. Patient has pancreatic mass. Started on IV PPI BID, GI called planning for endoscopy. Patient made NPO. Patient hemoglobin in AM decreased to 7.3 from 8.4, 1 unit of packed red blood cells transfused as per advanced GI service recommendations. Heme/onc consult recommends pancreatic mass biopsy. Patient sent up to endoscopy suite and was speaking to anesthesiology when she spontaneously went into vfib arrest. A code was called, rapid response team at bedside. Pt was ambu and code cart was brought to bedside. We were told that pt was DNR/DNI and we stopped intervening.  Pt was pronounced dead. Family called. Medical attending Dr. Alexandra informed.

## 2024-01-18 NOTE — PROGRESS NOTE ADULT - PROBLEM SELECTOR PLAN 3
-normocytic anemia, likely in setting of malignancy  > order microcytic, normocytic anemia work up     > iron, ferritin, transferrin     > LDH, hapto, t/d bili  > transfuse > hgb 7 - normocytic anemia, likely in setting of malignancy  - anemia panel sent, results pending   - transfuse > hgb 8- will give 1 unit of prbc 1/18

## 2024-01-18 NOTE — CHART NOTE - NSCHARTNOTEFT_GEN_A_CORE
Brief Update Note    Called to bedside for Ms. Brito for code; per team, patient was being interviewed by anesthesia (no meds given) when suddently developed pulseless VFib. She was noted to be DNR/DNI. Son was called; initially, he wanted to reverse the DNR/DNI, then when being informed that she had signed to be DNR/DNI while aox3, he expressed that that is correct and stated that he had a long discussion with her and she stated that she would not want any invasive or aggressive measures and wanted to be DNR/DNI and specifically asked him to bury her next to his father should she pass and he stated he would like to honor her wishes and keep the DNR/DNI in place.  Patient was pronounced  and the son was called again and came to the hospital after which another conversation was had with the Advanced GI Attending.     Cait Farrar MD  Gastroenterology/Hepatology Fellow, PGY-7    NON-URGENT CONSULTS:  Please email [email or web address]  [email or web address]  AT NIGHT AND ON WEEKENDS:  Contact on-call GI fellow via answering service ([phone]) from 5pm-8am and on weekends/holidays  MONDAY-FRIDAY 8AM-5PM:  Pager: [phone] ([facility])  Pager: [phone] (TONYA)

## 2024-01-18 NOTE — PRE PROCEDURE NOTE - PRE PROCEDURE EVALUATION
Attending Physician:  Dr Gentile                           Procedure:  endoscopy/possible duodenal stent placement on 2024.    Indication for Procedure: GOO/ ampullary mass   ________________________________________________________  PAST MEDICAL & SURGICAL HISTORY:  HLD (hyperlipidemia)      GERD (gastroesophageal reflux disease)      HTN (hypertension)      H/O:   x2        ALLERGIES:  tetracaine (Unknown)  penicillin (Unknown)    HOME MEDICATIONS:  ALPRAZolam 0.25 mg oral tablet: 1 tab(s) orally once a day (at bedtime) as needed for  anxiety  atorvastatin 10 mg oral tablet: 1 tab(s) orally once a day  gabapentin 100 mg oral capsule: 1 cap(s) orally 2 times a day  losartan-hydrochlorothiazide 50 mg-12.5 mg oral tablet: 1 tab(s) orally once a day  Melatonin 5 mg oral tablet: 1 tab(s) orally once a day (at bedtime)  metoprolol succinate 50 mg oral capsule, extended release: 1 cap(s) orally once a day  omeprazole 20 mg oral delayed release capsule: 1 cap(s) orally once a day    AICD/PPM: [ ] yes   [x ] no    PERTINENT LAB DATA:                        7.4    6.88  )-----------( 213      ( 2024 03:30 )             23.3     -    141  |  103  |  30<H>  ----------------------------<  166<H>  4.5   |  28  |  1.25    Ca    9.1      2024 03:23  Phos  2.3       Mg     2.0         TPro  6.6  /  Alb  3.4  /  TBili  0.3  /  DBili  0.1  /  AST  15  /  ALT  15  /  AlkPhos  237<H>  -18    PT/INR - ( 2024 03:25 )   PT: 12.8 sec;   INR: 1.17 ratio         PTT - ( 2024 03:25 )  PTT:31.0 sec  CARDIAC MARKERS ( 2024 12:12 )  x     / x     / 33 U/L / x     / x                PHYSICAL EXAMINATION:    T(C): 36.1  HR: 62  BP: 138/70  RR: 18  SpO2: 98%    Constitutional: NAD    Neck:  No JVD  Respiratory: no resp distress   Cardiovascular: rrr  Extremities: No peripheral edema  Neurological: A/O x 3, no focal deficits        COMMENTS:    The patient is a suitable candidate for the planned procedure unless box checked [ ]  No, explain:     Attending Physician:  Dr Gentile                           Procedure:  endoscopy/possible duodenal stent placement     Indication for Procedure: GOO/ ampullary mass   ________________________________________________________  PAST MEDICAL & SURGICAL HISTORY:  HLD (hyperlipidemia)      GERD (gastroesophageal reflux disease)      HTN (hypertension)      H/O:   x2        ALLERGIES:  tetracaine (Unknown)  penicillin (Unknown)    HOME MEDICATIONS:  ALPRAZolam 0.25 mg oral tablet: 1 tab(s) orally once a day (at bedtime) as needed for  anxiety  atorvastatin 10 mg oral tablet: 1 tab(s) orally once a day  gabapentin 100 mg oral capsule: 1 cap(s) orally 2 times a day  losartan-hydrochlorothiazide 50 mg-12.5 mg oral tablet: 1 tab(s) orally once a day  Melatonin 5 mg oral tablet: 1 tab(s) orally once a day (at bedtime)  metoprolol succinate 50 mg oral capsule, extended release: 1 cap(s) orally once a day  omeprazole 20 mg oral delayed release capsule: 1 cap(s) orally once a day    AICD/PPM: [ ] yes   [x ] no    PERTINENT LAB DATA:                        7.4    6.88  )-----------( 213      ( 2024 03:30 )             23.3         141  |  103  |  30<H>  ----------------------------<  166<H>  4.5   |  28  |  1.25    Ca    9.1      2024 03:23  Phos  2.3       Mg     2.0         TPro  6.6  /  Alb  3.4  /  TBili  0.3  /  DBili  0.1  /  AST  15  /  ALT  15  /  AlkPhos  237<H>  18    PT/INR - ( 2024 03:25 )   PT: 12.8 sec;   INR: 1.17 ratio         PTT - ( 2024 03:25 )  PTT:31.0 sec  CARDIAC MARKERS ( 2024 12:12 )  x     / x     / 33 U/L / x     / x                PHYSICAL EXAMINATION:    T(C): 36.1  HR: 62  BP: 138/70  RR: 18  SpO2: 98%    Constitutional: NAD    Neck:  No JVD  Respiratory: no resp distress, CTAB  Cardiovascular: rrr  Extremities: No peripheral edema  Neurological: A/O x 3, no focal deficits        COMMENTS:    The patient is a suitable candidate for the planned procedure unless box checked [ ]  No, explain:

## 2024-01-18 NOTE — PROGRESS NOTE ADULT - NSPROGADDITIONALINFOA_GEN_ALL_CORE
disposition: pending egd  discussed with ACP  discussed with son at bedside    Ashanti Alexandra D.O.  Division of Hospital Medicine  Available on MS Teams disposition: pending egd, will need NGT  discussed with ACP  discussed with GI fellow  discussed with son at bedside    Ashanti Alexandra D.O.  Division of Hospital Medicine  Available on MS Teams

## 2024-01-18 NOTE — SWALLOW BEDSIDE ASSESSMENT ADULT - H & P REVIEW
Nisha Brito is a 89F w/ PMH of HTN, GERD, pancreatic mass (inconclusive bx, s/p radiation 2023), presenting with n/v x 6 weeks and fall at home. Pt's son at bedside assisting with history, has been living with patient for the last several months. Pt reports since beginning of Dec 2023, she has had intermittent nausea, and when she eats or drinks, she feels gas-like abdominal pain, burning like acid reflux, and then proceeds to vomit everything she ate or drank. Having difficulty keeping most solid food down, no difficulty at that time with liquids. Her sx progressively worsened to the point where she could not keep solid food in her stomach (would vomit the food she ate about 1 hour after ingesting it). Then she started having difficulty keeping liquids in her stomach. States she vomits liquids after about 1 hour; though sometimes can tolerate it for a few days.  Tried eating small meals but does not help. D/w her oncologist and was sent for MRI 3 weeks ago revealing increase in pancreatic mass size since 2022 (4.2x 3.3 cm on MRCP 12/21/23 ). Was supposed to have f/u PET scan but was told her blood sugar was too high to have scan. Weight loss of approx 10-15 lbs during this time.  Patient reports she was getting out of bed and has baseline "balance issues" so she slid down to the floor. Son reports he found patient passed out on bedroom floor./yes

## 2024-01-18 NOTE — CONSULT NOTE ADULT - ASSESSMENT
PLAN:  - Please biopsy pancreatic mass. Once pathology results will review and determine whether patient qualifies for treatment

## 2024-01-18 NOTE — PROGRESS NOTE ADULT - SUBJECTIVE AND OBJECTIVE BOX
Patient was talking to me and then spontaneously went into vfib arrest. A code was called. Pt was ambu and code cart was brougth to bedside. We were told that pt was DNR/DNI and we stopped intervening.  PT was pronounced dead.

## 2024-01-18 NOTE — RAPID RESPONSE TEAM SUMMARY - NSSITUATIONBACKGROUNDRRT_GEN_ALL_CORE
89F w/ HTN, GERD, pancreatic mass who was admitted with nausea, vomiting, and a fall. The patient had signed a MOSLT and indicated DNR/DNI during admission. She was being consented by anesthesia for an endoscopy with possible duodenal stent placement, when she suddenly went into pulseless ventricular fibrillation. The patient's code status was confirmed with her son and HCP (her DNR/DNI was going to be rescinded for the procedure, however the procedure never commenced). All providers in the room were in agreement to allow for a natural passing (per the patient's wishes on her MOSLT), and the patient was pronounced.

## 2024-01-19 NOTE — PROGRESS NOTE ADULT - SUBJECTIVE AND OBJECTIVE BOX
Date of Service: 01-19-24 @ 03:57           CARDIOLOGY     PROGRESS  NOTE   ________________________________________________    CHIEF COMPLAINT:Patient is a 89y old  Female who presents with a chief complaint of Vomiting (18 Jan 2024 16:29)  doing well wants to go home  	  REVIEW OF SYSTEMS:  CONSTITUTIONAL: No fever, weight loss, or fatigue  EYES: No eye pain, visual disturbances, or discharge  ENT:  No difficulty hearing, tinnitus, vertigo; No sinus or throat pain  NECK: No pain or stiffness  RESPIRATORY: No cough, wheezing, chills or hemoptysis; No Shortness of Breath  CARDIOVASCULAR: No chest pain, palpitations, passing out, dizziness, or leg swelling  GASTROINTESTINAL: No abdominal or epigastric pain. No nausea, vomiting, or hematemesis; No diarrhea or constipation. No melena or hematochezia.  GENITOURINARY: No dysuria, frequency, hematuria, or incontinence  NEUROLOGICAL: No headaches, memory loss, loss of strength, numbness, or tremors  SKIN: No itching, burning, rashes, or lesions   LYMPH Nodes: No enlarged glands  ENDOCRINE: No heat or cold intolerance; No hair loss  MUSCULOSKELETAL: No joint pain or swelling; No muscle, back, or extremity pain  PSYCHIATRIC: No depression, anxiety, mood swings, or difficulty sleeping  HEME/LYMPH: No easy bruising, or bleeding gums  ALLERGY AND IMMUNOLOGIC: No hives or eczema	    [x ] All others negative	  [ ] Unable to obtain    PHYSICAL EXAM:  T(C): 36.9 (01-18-24 @ 14:44), Max: 36.9 (01-18-24 @ 04:57)  HR: 57 (01-18-24 @ 14:44) (57 - 67)  BP: 137/64 (01-18-24 @ 14:44) (128/65 - 138/70)  RR: 18 (01-18-24 @ 14:44) (18 - 18)  SpO2: 100% (01-18-24 @ 14:44) (93% - 100%)  Wt(kg): --  I&O's Summary      Appearance: Normal	  HEENT:   Normal oral mucosa, PERRL, EOMI	  Lymphatic: No lymphadenopathy  Cardiovascular: Normal S1 S2, No JVD, + murmurs, No edema  Respiratory: rhonchi  Psychiatry: A & O x 3, Mood & affect appropriate  Gastrointestinal:  Soft, Non-tender, + BS	  Skin: No rashes, No ecchymoses, No cyanosis	  Neurologic: Non-focal  Extremities: Normal range of motion, No clubbing, cyanosis or edema  Vascular: Peripheral pulses palpable 2+ bilaterally    MEDICATIONS  (STANDING):  atorvastatin 10 milliGRAM(s) Oral at bedtime  gabapentin 100 milliGRAM(s) Oral two times a day  lactated ringers. 1000 milliLiter(s) (70 mL/Hr) IV Continuous <Continuous>  melatonin 3 milliGRAM(s) Oral at bedtime  metoprolol succinate ER 50 milliGRAM(s) Oral daily  pantoprazole  Injectable 40 milliGRAM(s) IV Push every 12 hours  sodium chloride 0.9%. 500 milliLiter(s) (30 mL/Hr) IV Continuous <Continuous>      TELEMETRY: 	    ECG:  	  RADIOLOGY:  OTHER: 	  	  LABS:	 	    CARDIAC MARKERS:  CARDIAC MARKERS ( 17 Jan 2024 12:12 )  x     / x     / 33 U/L / x     / x                                    7.4    6.88  )-----------( 213      ( 18 Jan 2024 03:30 )             23.3     01-18    141  |  103  |  30<H>  ----------------------------<  166<H>  4.5   |  28  |  1.25    Ca    9.1      18 Jan 2024 03:23  Phos  2.3     01-18  Mg     2.0     01-18    TPro  6.6  /  Alb  3.4  /  TBili  0.3  /  DBili  0.1  /  AST  15  /  ALT  15  /  AlkPhos  237<H>  01-18    proBNP:   Lipid Profile:   HgA1c:   TSH:   PT/INR - ( 18 Jan 2024 03:25 )   PT: 12.8 sec;   INR: 1.17 ratio         PTT - ( 18 Jan 2024 03:25 )  PTT:31.0 sec    Culture - Urine (07.19.22 @ 21:45)    Specimen Source: Clean Catch Clean Catch (Midstream)   Culture Results:   >=3 organisms. Probable collection contamination.    Culture - Blood (07.19.22 @ 18:20)    Specimen Source: .Blood Blood-Peripheral   Culture Results:   No Growth Final        Assessment and plan  ---------------------------  89yo F with PMH of HTN, GERD, pancreatic mass (inconclusive bx, s/p radiation 2023), presenting with n/v x 6 weeks and fall today at home. Pt's son at bedside assisting with history, has been living with patient for the last several months. Pt reports since beginning of Dec 2023, she has had intermittent nausea, and when she eats or drinks, she feels gas-like abdominal pain, burning like acid reflux, and then proceeds to vomit everything she ate or drank. Tried eating small meals but does not help. +flatulence and having normal BMs, last BM yesterday. D/w her oncologist and was sent for MRI 3 weeks ago revealing increase in pancreatic mass size since 2022. Was supposed to have f/u PET scan but was told her blood sugar was too high to have scan. Weight loss of approx 10-15 lbs during this time. Today patient reports she was getting out of bed this morning and has baseline "balance issues" so she slid down to the floor. Son reports he found patient passed out on bedroom floor. Patient denies any pain from fall. Denies any CP, palpitations, SOB prior to fall or now. Denies any fever/chills, diarrhea, black/bloody stools, urinary symptoms, increased thirst, numbness/tingling, focal weakness, HA, vision changes, h/o diabetes.  is well known to me with hx of abdominal mass with nausea and vomiting, with tachycardia  pt is been c/o intermittent palpitation at home  beta blocker as tolerated, if recurrent will start on amio  check electrolytes  dvt prophylaxis  keep hgb>8  palliative eval  no further tachycardia    	         pt has  yesterday

## 2024-01-31 ENCOUNTER — APPOINTMENT (OUTPATIENT)
Dept: HEMATOLOGY ONCOLOGY | Facility: CLINIC | Age: 89
End: 2024-01-31

## 2024-02-01 ENCOUNTER — TRANSCRIPTION ENCOUNTER (OUTPATIENT)
Age: 89
End: 2024-02-01

## 2024-02-15 ENCOUNTER — TRANSCRIPTION ENCOUNTER (OUTPATIENT)
Age: 89
End: 2024-02-15

## 2024-05-09 NOTE — PROGRESS NOTE ADULT - PROBLEM SELECTOR PLAN 4
71-year-old male past medical history hypertension, ESRD on dialysis Monday Wednesday Friday, diabetes insulin-dependent presents with hiccups patient endorses persistent hiccups for the last 2 days.   found to have peaked T waves, a new finding. denied dizziness, N/V, denies CP, palps, abd pain  Upon admission seen by CArd, renal and GI  found to have a distended GB prob 2/2 gastroparesis, was started on regaln  has received 4 doses of baclofen since 4/30 2/2 hiccups, last dose on 5/1 at 5 am  had received Haldol for agitation at 11 pm on 4/30, AMS observed in pm of 5/1  AMS ongoing, RRT x 2 called  now transferred to MICU for encephalopathy requiring  airway protection on 5/2,  intubated for airway protection, was on  propofol and pressors. now off   HD was not done timely until femoral shiley was placed 2/2 clotted RUE AVF. now removed and RIJ shiley placed on 5/3  has been nonverbal since pm 5/1.     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 as per neuro: embolic in nature.   encephalopathy probably 2/2 acute CVA, now follows commands appropriately off sedation  no SZ focus on EEG,   off CRRT,  HD resumed as per renal.   remains intubated,   off keppra  off AC, off pressors, off CRRT, on HD as per renal,   completed 5 days of empiric ZOSYN on 5/7  toxicology consulted upon dx of encephalopathy, not impressed AMS 2/2 Haldol nor baclofen . AMS was 2/2 acute CVA   afebrile, off pressors, shock resolved  remains intubated for airway protection,   leukocytosis resolved  EKG changes on 5/3 c/w NSTEMI loaded w DAPT , was  on Heparin drip x 48 hrs. rpt TTE is unchanged  ID, Neuro , renal, cardiology following.  esrd, had missed HD prior to AMS 2/2 clotted RUE AVF. fistulogram when medically stable. vascular following   HD via RIght IJ Shiley.   NGT in place in stomach.   LP done, no sign of meningitis.         NEUROLOGY     - CTH without acute findings   - LP done, no acute findings  - MR brain w acute infarcts: L talya and L cerebellum, nonhemorrhagic  - no Sz focus on EEG    CARDIOVASCULAR     - ptn never had CP. just peaked T waves. was awaiting ischemic study w nucl stress test  - TTE showing EF 72%, rpt unchanged  -EKG changes on 5/3, loaded w DAPT now on Heparin drip      GI  - on NGT feeds while sedated, intubated  - Gastroparesis       RENAL   - s/p CRRT in MICU, now off, HD as per renal  - via R IJ kolby. R fem removed  - pending fistula study of RUE when medically optimized      INFECTIOUS DISEASE   - sepsis resolved  - pan scan  of C/A/P on admission was neg  -  blood cx NTD  - s/p 5 days pf empiric  ZOsyn    ENDOCRINE   - DM  - cw ISS    GOC:  Full code Patient cannot recall home med, pill once per day "takes most days." Reports home BPs usually 115-120s systolic.   Mostly normotensive, 1 reading of systolic 160s, asymptomatic.   - will hold BP meds for now  - med rec in AM and consider resuming inpatient. Patient cannot recall home med, pill once per day "takes most days." Reports home BPs usually 115-120s systolic.   Mostly normotensive, 1 reading of systolic 160s, asymptomatic.   - will hold BP meds for now  - home meds are atenolol 25 daily, hydrochlorothiazide 25 mg daily, Losartan 100 mg daily, Patient cannot recall home med, pill once per day "takes most days." Reports home BPs usually 115-120s systolic.   Normotensive throughout hospital course.  - Restarted on home atenolol 25 mg daily,   hydrochlorothiazide 25 mg daily and losartan 100 mg daily currently held given CAMRON on CKD.

## 2024-06-04 NOTE — PROGRESS NOTE ADULT - PROBLEM SELECTOR PLAN 2
[FreeTextEntry1] : 74-yo man with a history of a total gastrectomy, distal esophagectomy and distal pancreatectomy for gastric CA in .  Had mild type 2 DM before the gastrectomy, was able to be controlled on oral agents after the surgery, but steadily lost weight over the next few years due to poor PO intake.  Had a feeding tube inserted in approximately  because of the weight loss and is currently on overnight feeds.  He has needed insulin to cover the feeds since that time. Was initially seen by me during a hospitalization in 2023 because of inadequate glycemic control.  His insulin regimen was modified from a single dose of NPH (given prior to starting the feeds each night)  to a combination of NPH and lispro prior to starting the feeds.  The duration of his feeds was also increased to 10 hours.  He is now on a 1.4 kwan/cc formula which runs from 6 PM to 4 AM the next morning at 110 cc/hr.     Interim history since his last visit: --Sister  of pancreatic CA 2 months ago --Saw an interventionalist at [facility] regarding a small jordy-stomal leak around the feeding tube, which seems to have been corrected by a small suture in one area where there may have been a defect --Also had an abdominal CT scan which was unchanged.  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  The partial portal vein thrombosis has resolved). --Saw Dr. Sullivan for repeat dermatological evaluation because of a "bruise"-like lesion on his calf, but biopsy showed only purpura Appetite has been decreased, and he has lost 3 lb since his last visit. Is starting his feeds by 6 PM, but has been terminating them somewhat early once again. (Seems to be turning them off when he gets up at 3 AM) Blood sugars when he starts the feeds are usually 110-120.  Initially said that his glucoses when he awakens at 3 AM are 180-190, but then says "150-160" (and the average on his Dolly for MN-6 AM is 139. Taking 25 NPH plus 5 units lispro before the feeds are started. Reviewed data on his Dolly reader: Average blood sugar overall for the past 90 days was 127 mg% MN-6 AM         145 mg% 6 AM-noon       109 mg% Noon-6 PM      113 mg% 6 PM-MN         139 mg% He eats multiple small meals during the day, and "covers" these with 2 unit boluses of lispro insulin.   His other active medical problems include hypothyroidism, dyslipidemia, microalbuminuria (presumably secondary to his diabetes) and B-12 deficiency (secondary to his total gastrectomy). - Creatinine up to 2.8 (7/20) this AM from admission level of 2.016  Previous creatinine of Cr 1.58 (2018) patient denies Hx CKD however.   Poor PO hydration and intake x 4 days,   - Received 1L bolus of NS this AM, on 100 ml/hr of NS for follow-up 1L of NS.   - F/u urine lytes  Of note has 5cm b/l renal cysts seen on CTAP.   - less likely contrast nephropathy due to <48 hour time window since receiving last contrast.   - avoid nephrotoxins  - hydrochlorothiazide 25 mg daily and losartan 100 mg daily currently held given CAMRON on CKD. - Creatinine up to 3.27 (7/23) this AM from admission level of 2.016  Previous creatinine of Cr 1.58 (2018) patient denies Hx CKD however.   - F/u urine lytes  Of note has 5cm b/l renal cysts seen on CTAP.   - less likely contrast nephropathy due to <48 hour time window since receiving last contrast.   - avoid nephrotoxins  - hydrochlorothiazide 25 mg daily and losartan 100 mg daily currently held given CAMRON on CKD.  - renal consult in AM

## 2025-04-18 NOTE — PRE-ANESTHESIA EVALUATION ADULT - NSPREOPDXFT_GEN_ALL_CORE
Anesthesia Volume In Cc: 5 Did You Provide Opioid Counseling: No Repair Type: Flap Which Eyelid Repair Cpt Are You Using?: 67158 Simple / Intermediate / Complex Repair - Final Wound Length In Cm: 0 Deep Sutures: 5-0 Monocryl Epidermal Sutures: 6-0 Prolene Suturegard Retention Suture: 2-0 Nylon Retention Suture Bite Size: 3 mm Length To Time In Minutes Device Was In Place: 10 Number Of Hemigard Strips Per Side: 1 Undermining Type: Entire Wound Debridement Text: The wound edges were debrided prior to proceeding with the closure to facilitate wound healing. Helical Rim Text: The closure involved the helical rim. Vermilion Border Text: The closure involved the vermilion border. Nostril Rim Text: The closure involved the nostril rim. Retention Suture Text: Retention sutures were placed to support the closure and prevent dehiscence. Flap Type: O-L Flap Primary Defect Length (In Cm): 0.5 Secondary Defect Width (In Cm): 1.5 Flap Donor Site: nose Skin Substitute: EpiFix Micronized Include The Following Details In The Note (If No Details Will Only Be Reflected In The Surgical Fax): Yes Pre-Op Size Of Lesion Removed (Optional): 0.3 Mohs Case Number (Optional): [id number] POLYP Date Of Previous Biopsy (Optional): 3/6/25 Previous Accession (Optional): [id number] Detail Level: Detailed Anesthesia Type: 1% lidocaine with epinephrine and a 1:10 solution of 8.4% sodium bicarbonate Additional Anesthesia Volume In Cc: 6 Hemostasis: Electrocautery Estimated Blood Loss (Cc): 5 cc Brow Lift Text: A midfrontal incision was made medially to the defect to allow access to the tissues just superior to the left eyebrow. Following careful dissection inferiorly in a supraperiosteal plane to the level of the left eyebrow, several 3-0 monocryl sutures were used to resuspend the eyebrow orbicularis oculi muscular unit to the superior frontal bone periosteum. This resulted in an appropriate reapproximation of static eyebrow symmetry and correction of the left brow ptosis. Epidermal Closure: running Wound Care: Vaseline Dressing: pressure dressing with telfa Unna Boot Text: An Unna boot was placed to help immobilize the limb and facilitate more rapid healing. Suturegard Intro: Intraoperative tissue expansion was performed, utilizing the SUTUREGARD device, in order to reduce wound tension. Suturegard Body: The suture ends were repeatedly re-tightened and re-clamped to achieve the desired tissue expansion. Hemigard Intro: Due to skin fragility and wound tension, it was decided to use HEMIGARD adhesive retention suture devices to permit a linear closure. The skin was cleaned and dried for a 6cm distance away from the wound. Excessive hair, if present, was removed to allow for adhesion. Hemigard Postcare Instructions: The HEMIGARD strips are to remain completely dry for at least 5-7 days. Graft Donor Site Epidermal Sutures (Optional): 5-0 Fast Absorbing Gut Graft Donor Site Bandage (Optional-Leave Blank If You Don't Want In Note): Steri-strips and a pressure bandage were applied to the donor site. Closure 2 Information: This tab is for additional flaps and grafts, including complex repair and grafts and complex repair and flaps. You can also specify a different location for the additional defect, if the location is the same you do not need to select a new one. We will insert the automated text for the repair you select below just as we do for solitary flaps and grafts. Please note that at this time if you select a location with a different insurance zone you will need to override the ICD10 and CPT if appropriate. Closure 3 Information: This tab is for additional flaps and grafts above and beyond our usual structured repairs.  Please note if you enter information here it will not currently bill and you will need to add the billing information manually. Complex Repair Preamble Text (Leave Blank If You Do Not Want): Extensive wide undermining was performed. Intermediate Repair Preamble Text (Leave Blank If You Do Not Want): Undermining was performed with blunt dissection. Flap Thinning Complex Repair Preamble Text (Leave Blank If You Do Not Want): An incision was made along the previous flap suture line. Undermining was performed beneath the flap and redundant tissue was removed to restore the normal contour of the skin. Non-Graft Cartilage Fenestration Text: The cartilage was fenestrated with a 2mm punch biopsy to help facilitate healing. Graft Cartilage Fenestration Text: The cartilage was fenestrated with a 2mm punch biopsy to help facilitate graft survival and healing. Preparation Of Recipient Site - Flap: The eschar was removed surgically with sharp dissection to facilitate appropriate wound healing of the following adjacent tissue rearrangement. Preparation Of Recipient Site - Graft: The eschar was removed surgically with sharp dissection to facilitate appropriate survival of the following graft. Preparation Of Recipient Site - Flap Takedown: The eschar and granulation tissue was removed surgically with sharp dissection to facilitate appropriate healing after division and inset of the proximal and distal interpolation flap. Secondary Intention Text (Leave Blank If You Do Not Want): The defect will heal with secondary intention. No Repair - Repaired With Adjacent Surgical Defect Text (Leave Blank If You Do Not Want): After obtaining clear surgical margins the defect was repaired concurrently with another surgical defect which was in close approximation. Referred To Oculoplastics For Closure Text (Leave Blank If You Do Not Want): After obtaining clear surgical margins the patient was sent to oculoplastics for surgical repair.  The patient understands they will receive post-surgical care and follow-up from the referring physician's office. Referred To Otolaryngology For Closure Text (Leave Blank If You Do Not Want): After obtaining clear surgical margins the patient was sent to otolaryngology for surgical repair.  The patient understands they will receive post-surgical care and follow-up from the referring physician's office. Referred To Plastics For Closure Text (Leave Blank If You Do Not Want): After obtaining clear surgical margins the patient was sent to plastics for surgical repair.  The patient understands they will receive post-surgical care and follow-up from the referring physician's office. Referred To Asc For Closure Text (Leave Blank If You Do Not Want): After obtaining clear surgical margins the patient was sent to an ASC for surgical repair.  The patient understands they will receive post-surgical care and follow-up from the ASC physician. Referred To Mid-Level For Closure Text (Leave Blank If You Do Not Want): After obtaining clear surgical margins the patient was sent to a mid-level provider for surgical repair.  The patient understands they will receive post-surgical care and follow-up from the mid-level provider. Repair Performed By Another Provider Text (Leave Blank If You Do Not Want): After obtaining clear surgical margins the defect was repaired by another provider. Adjacent Tissue Transfer Text: The defect edges were debeveled with a #15 scalpel blade.  Given the location of the defect and the proximity to free margins an adjacent tissue transfer was deemed most appropriate.  Using a sterile surgical marker, an appropriate flap was drawn incorporating the defect and placing the expected incisions within the relaxed skin tension lines where possible.    The area thus outlined was incised deep to adipose tissue with a #15 scalpel blade.  The skin margins were undermined to an appropriate distance in all directions utilizing iris scissors. Advancement Flap (Single) Text: The defect edges were debeveled with a #15 scalpel blade.  Given the location of the defect and the proximity to free margins a single advancement flap was deemed most appropriate.  Using a sterile surgical marker, an appropriate advancement flap was drawn incorporating the defect and placing the expected incisions within the relaxed skin tension lines where possible.    The area thus outlined was incised deep to adipose tissue with a #15 scalpel blade.  The skin margins were undermined to an appropriate distance in all directions utilizing iris scissors. Advancement Flap (Double) Text: The defect edges were debeveled with a #15 scalpel blade.  Given the location of the defect and the proximity to free margins a double advancement flap was deemed most appropriate.  Using a sterile surgical marker, the appropriate advancement flaps were drawn incorporating the defect and placing the expected incisions within the relaxed skin tension lines where possible.    The area thus outlined was incised deep to adipose tissue with a #15 scalpel blade.  The skin margins were undermined to an appropriate distance in all directions utilizing iris scissors. Advancement-Rotation Flap Text: The defect edges were debeveled with a #15 scalpel blade.  Given the location of the defect, shape of the defect and the proximity to free margins an advancement-rotation flap was deemed most appropriate.  Using a sterile surgical marker, an appropriate flap was drawn incorporating the defect and placing the expected incisions within the relaxed skin tension lines where possible. The area thus outlined was incised deep to adipose tissue with a #15 scalpel blade.  The skin margins were undermined to an appropriate distance in all directions utilizing iris scissors. Alar Island Pedicle Flap Text: The defect edges were debeveled with a #15 scalpel blade.  Given the location of the defect, shape of the defect and the proximity to the alar rim an island pedicle advancement flap was deemed most appropriate.  Using a sterile surgical marker, an appropriate advancement flap was drawn incorporating the defect, outlining the appropriate donor tissue and placing the expected incisions within the nasal ala running parallel to the alar rim. The area thus outlined was incised with a #15 scalpel blade.  The skin margins were undermined minimally to an appropriate distance in all directions around the primary defect and laterally outward around the island pedicle utilizing iris scissors.  There was minimal undermining beneath the pedicle flap. A-T Advancement Flap Text: The defect edges were debeveled with a #15 scalpel blade.  Given the location of the defect, shape of the defect and the proximity to free margins an A-T advancement flap was deemed most appropriate.  Using a sterile surgical marker, an appropriate advancement flap was drawn incorporating the defect and placing the expected incisions within the relaxed skin tension lines where possible.    The area thus outlined was incised deep to adipose tissue with a #15 scalpel blade.  The skin margins were undermined to an appropriate distance in all directions utilizing iris scissors. Banner Transposition Flap Text: The defect edges were debeveled with a #15 scalpel blade.  Given the location of the defect and the proximity to free margins a Banner transposition flap was deemed most appropriate.  Using a sterile surgical marker, an appropriate flap drawn around the defect. The area thus outlined was incised deep to adipose tissue with a #15 scalpel blade.  The skin margins were undermined to an appropriate distance in all directions utilizing iris scissors. Bilateral Helical Rim Advancement Flap Text: The defect edges were debeveled with a #15 blade scalpel.  Given the location of the defect and the proximity to free margins (helical rim) a bilateral helical rim advancement flap was deemed most appropriate.  Using a sterile surgical marker, the appropriate advancement flaps were drawn incorporating the defect and placing the expected incisions between the helical rim and antihelix where possible.  The area thus outlined was incised through and through with a #15 scalpel blade.  With a skin hook and iris scissors, the flaps were gently and sharply undermined and freed up. Bilateral Rotation Flap Text: The defect edges were debeveled with a #15 scalpel blade. Given the location of the defect, shape of the defect and the proximity to free margins a bilateral rotation flap was deemed most appropriate. Using a sterile surgical marker, an appropriate rotation flap was drawn incorporating the defect and placing the expected incisions within the relaxed skin tension lines where possible. The area thus outlined was incised deep to adipose tissue with a #15 scalpel blade. The skin margins were undermined to an appropriate distance in all directions utilizing iris scissors. Following this, the designed flap was carried over into the primary defect and sutured into place. Bilobed Flap Text: The defect edges were debeveled with a #15 scalpel blade.  Given the location of the defect and the proximity to free margins a bilobe flap was deemed most appropriate.  Using a sterile surgical marker, an appropriate bilobe flap drawn around the defect.    The area thus outlined was incised deep to adipose tissue with a #15 scalpel blade.  The skin margins were undermined to an appropriate distance in all directions utilizing iris scissors. Bilobed Transposition Flap Text: The defect edges were debeveled with a #15 scalpel blade.  Given the location of the defect and the proximity to free margins a bilobed transposition flap was deemed most appropriate.  Using a sterile surgical marker, an appropriate bilobe flap drawn around the defect.    The area thus outlined was incised deep to adipose tissue with a #15 scalpel blade.  The skin margins were undermined to an appropriate distance in all directions utilizing iris scissors. Bi-Rhombic Flap Text: The defect edges were debeveled with a #15 scalpel blade.  Given the location of the defect and the proximity to free margins a bi-rhombic flap was deemed most appropriate.  Using a sterile surgical marker, an appropriate rhombic flap was drawn incorporating the defect. The area thus outlined was incised deep to adipose tissue with a #15 scalpel blade.  The skin margins were undermined to an appropriate distance in all directions utilizing iris scissors. Burow's Advancement Flap Text: The defect edges were debeveled with a #15 scalpel blade.  Given the location of the defect and the proximity to free margins a Burow's advancement flap was deemed most appropriate.  Using a sterile surgical marker, the appropriate advancement flap was drawn incorporating the defect and placing the expected incisions within the relaxed skin tension lines where possible.    The area thus outlined was incised deep to adipose tissue with a #15 scalpel blade.  The skin margins were undermined to an appropriate distance in all directions utilizing iris scissors. Chonodrocutaneous Helical Advancement Flap Text: The defect edges were debeveled with a #15 scalpel blade.  Given the location of the defect and the proximity to free margins a chondrocutaneous helical advancement flap was deemed most appropriate.  Using a sterile surgical marker, the appropriate advancement flap was drawn incorporating the defect and placing the expected incisions within the relaxed skin tension lines where possible.    The area thus outlined was incised deep to adipose tissue with a #15 scalpel blade.  The skin margins were undermined to an appropriate distance in all directions utilizing iris scissors. Crescentic Advancement Flap Text: The defect edges were debeveled with a #15 scalpel blade.  Given the location of the defect and the proximity to free margins a crescentic advancement flap was deemed most appropriate.  Using a sterile surgical marker, the appropriate advancement flap was drawn incorporating the defect and placing the expected incisions within the relaxed skin tension lines where possible.    The area thus outlined was incised deep to adipose tissue with a #15 scalpel blade.  The skin margins were undermined to an appropriate distance in all directions utilizing iris scissors. Dorsal Nasal Flap Text: The defect edges were debeveled with a #15 scalpel blade.  Given the location of the defect and the proximity to free margins a dorsal nasal flap was deemed most appropriate.  Using a sterile surgical marker, an appropriate dorsal nasal flap was drawn around the defect.    The area thus outlined was incised deep to adipose tissue with a #15 scalpel blade.  The skin margins were undermined to an appropriate distance in all directions utilizing iris scissors. Double Island Pedicle Flap Text: The defect edges were debeveled with a #15 scalpel blade.  Given the location of the defect, shape of the defect and the proximity to free margins a double island pedicle advancement flap was deemed most appropriate.  Using a sterile surgical marker, an appropriate advancement flap was drawn incorporating the defect, outlining the appropriate donor tissue and placing the expected incisions within the relaxed skin tension lines where possible.    The area thus outlined was incised deep to adipose tissue with a #15 scalpel blade.  The skin margins were undermined to an appropriate distance in all directions around the primary defect and laterally outward around the island pedicle utilizing iris scissors.  There was minimal undermining beneath the pedicle flap. Double O-Z Flap Text: The defect edges were debeveled with a #15 scalpel blade.  Given the location of the defect, shape of the defect and the proximity to free margins a Double O-Z flap was deemed most appropriate.  Using a sterile surgical marker, an appropriate transposition flap was drawn incorporating the defect and placing the expected incisions within the relaxed skin tension lines where possible. The area thus outlined was incised deep to adipose tissue with a #15 scalpel blade.  The skin margins were undermined to an appropriate distance in all directions utilizing iris scissors. Double O-Z Plasty Text: The defect edges were debeveled with a #15 scalpel blade.  Given the location of the defect, shape of the defect and the proximity to free margins a Double O-Z plasty (double transposition flap) was deemed most appropriate.  Using a sterile surgical marker, the appropriate transposition flaps were drawn incorporating the defect and placing the expected incisions within the relaxed skin tension lines where possible. The area thus outlined was incised deep to adipose tissue with a #15 scalpel blade.  The skin margins were undermined to an appropriate distance in all directions utilizing iris scissors.  Hemostasis was achieved with electrocautery.  The flaps were then transposed into place, one clockwise and the other counterclockwise, and anchored with interrupted buried subcutaneous sutures. Double Z Plasty Text: The lesion was extirpated to the level of the fat with a #15 scalpel blade. Given the location of the defect, shape of the defect and the proximity to free margins a double Z-plasty was deemed most appropriate for repair. Using a sterile surgical marker, the appropriate transposition arms of the double Z-plasty were drawn incorporating the defect and placing the expected incisions within the relaxed skin tension lines where possible. The area thus outlined was incised deep to adipose tissue with a #15 scalpel blade. The skin margins were undermined to an appropriate distance in all directions utilizing iris scissors. The opposing transposition arms were then transposed and carried over into place in opposite direction and anchored with interrupted buried subcutaneous sutures. Ear Star Wedge Flap Text: The defect edges were debeveled with a #15 blade scalpel.  Given the location of the defect and the proximity to free margins (helical rim) an ear star wedge flap was deemed most appropriate.  Using a sterile surgical marker, the appropriate flap was drawn incorporating the defect and placing the expected incisions between the helical rim and antihelix where possible.  The area thus outlined was incised through and through with a #15 scalpel blade. Flip-Flop Flap Text: The defect edges were debeveled with a #15 blade scalpel.  Given the location of the defect and the proximity to free margins a flip-flop flap was deemed most appropriate. Using a sterile surgical marker, the appropriate flap was drawn incorporating the defect and placing the expected incisions between the helical rim and antihelix where possible.  The area thus outlined was incised through and through with a #15 scalpel blade. Following this, the designed flap was carried over into the primary defect and sutured into place. Hatchet Flap Text: The defect edges were debeveled with a #15 scalpel blade.  Given the location of the defect, shape of the defect and the proximity to free margins a hatchet flap was deemed most appropriate.  Using a sterile surgical marker, an appropriate hatchet flap was drawn incorporating the defect and placing the expected incisions within the relaxed skin tension lines where possible.    The area thus outlined was incised deep to adipose tissue with a #15 scalpel blade.  The skin margins were undermined to an appropriate distance in all directions utilizing iris scissors. Helical Rim Advancement Flap Text: The defect edges were debeveled with a #15 blade scalpel.  Given the location of the defect and the proximity to free margins (helical rim) a double helical rim advancement flap was deemed most appropriate.  Using a sterile surgical marker, the appropriate advancement flaps were drawn incorporating the defect and placing the expected incisions between the helical rim and antihelix where possible.  The area thus outlined was incised through and through with a #15 scalpel blade.  With a skin hook and iris scissors, the flaps were gently and sharply undermined and freed up. H Plasty Text: Given the location of the defect, shape of the defect and the proximity to free margins a H-plasty was deemed most appropriate for repair.  Using a sterile surgical marker, the appropriate advancement arms of the H-plasty were drawn incorporating the defect and placing the expected incisions within the relaxed skin tension lines where possible. The area thus outlined was incised deep to adipose tissue with a #15 scalpel blade. The skin margins were undermined to an appropriate distance in all directions utilizing iris scissors.  The opposing advancement arms were then advanced into place in opposite direction and anchored with interrupted buried subcutaneous sutures. Island Pedicle Flap Text: The defect edges were debeveled with a #15 scalpel blade.  Given the location of the defect, shape of the defect and the proximity to free margins an island pedicle advancement flap was deemed most appropriate.  Using a sterile surgical marker, an appropriate advancement flap was drawn incorporating the defect, outlining the appropriate donor tissue and placing the expected incisions within the relaxed skin tension lines where possible.    The area thus outlined was incised deep to adipose tissue with a #15 scalpel blade.  The skin margins were undermined to an appropriate distance in all directions around the primary defect and laterally outward around the island pedicle utilizing iris scissors.  There was minimal undermining beneath the pedicle flap. Island Pedicle Flap With Canthal Suspension Text: The defect edges were debeveled with a #15 scalpel blade.  Given the location of the defect, shape of the defect and the proximity to free margins an island pedicle advancement flap was deemed most appropriate.  Using a sterile surgical marker, an appropriate advancement flap was drawn incorporating the defect, outlining the appropriate donor tissue and placing the expected incisions within the relaxed skin tension lines where possible. The area thus outlined was incised deep to adipose tissue with a #15 scalpel blade.  The skin margins were undermined to an appropriate distance in all directions around the primary defect and laterally outward around the island pedicle utilizing iris scissors.  There was minimal undermining beneath the pedicle flap. A suspension suture was placed in the canthal tendon to prevent tension and prevent ectropion. Island Pedicle Flap-Requiring Vessel Identification Text: The defect edges were debeveled with a #15 scalpel blade.  Given the location of the defect, shape of the defect and the proximity to free margins an island pedicle advancement flap was deemed most appropriate.  Using a sterile surgical marker, an appropriate advancement flap was drawn, based on the axial vessel mentioned above, incorporating the defect, outlining the appropriate donor tissue and placing the expected incisions within the relaxed skin tension lines where possible.    The area thus outlined was incised deep to adipose tissue with a #15 scalpel blade.  The skin margins were undermined to an appropriate distance in all directions around the primary defect and laterally outward around the island pedicle utilizing iris scissors.  There was minimal undermining beneath the pedicle flap. Keystone Flap Text: The defect edges were debeveled with a #15 scalpel blade.  Given the location of the defect, shape of the defect a keystone flap was deemed most appropriate.  Using a sterile surgical marker, an appropriate keystone flap was drawn incorporating the defect, outlining the appropriate donor tissue and placing the expected incisions within the relaxed skin tension lines where possible. The area thus outlined was incised deep to adipose tissue with a #15 scalpel blade.  The skin margins were undermined to an appropriate distance in all directions around the primary defect and laterally outward around the flap utilizing iris scissors. Melolabial Transposition Flap Text: The defect edges were debeveled with a #15 scalpel blade.  Given the location of the defect and the proximity to free margins a melolabial flap was deemed most appropriate.  Using a sterile surgical marker, an appropriate melolabial transposition flap was drawn incorporating the defect.    The area thus outlined was incised deep to adipose tissue with a #15 scalpel blade.  The skin margins were undermined to an appropriate distance in all directions utilizing iris scissors. Mercedes Flap Text: The defect edges were debeveled with a #15 scalpel blade.  Given the location of the defect, shape of the defect and the proximity to free margins a Mercedes flap was deemed most appropriate.  Using a sterile surgical marker, an appropriate advancement flap was drawn incorporating the defect and placing the expected incisions within the relaxed skin tension lines where possible. The area thus outlined was incised deep to adipose tissue with a #15 scalpel blade.  The skin margins were undermined to an appropriate distance in all directions utilizing iris scissors. Modified Advancement Flap Text: The defect edges were debeveled with a #15 scalpel blade.  Given the location of the defect, shape of the defect and the proximity to free margins a modified advancement flap was deemed most appropriate.  Using a sterile surgical marker, an appropriate advancement flap was drawn incorporating the defect and placing the expected incisions within the relaxed skin tension lines where possible.    The area thus outlined was incised deep to adipose tissue with a #15 scalpel blade.  The skin margins were undermined to an appropriate distance in all directions utilizing iris scissors. Mucosal Advancement Flap Text: Given the location of the defect, shape of the defect and the proximity to free margins a mucosal advancement flap was deemed most appropriate. Incisions were made with a 15 blade scalpel in the appropriate fashion along the cutaneous vermilion border and the mucosal lip. The remaining actinically damaged mucosal tissue was excised.  The mucosal advancement flap was then elevated to the gingival sulcus with care taken to preserve the neurovascular structures and advanced into the primary defect. Care was taken to ensure that precise realignment of the vermilion border was achieved. Muscle Hinge Flap Text: The defect edges were debeveled with a #15 scalpel blade.  Given the size, depth and location of the defect and the proximity to free margins a muscle hinge flap was deemed most appropriate.  Using a sterile surgical marker, an appropriate hinge flap was drawn incorporating the defect. The area thus outlined was incised with a #15 scalpel blade.  The skin margins were undermined to an appropriate distance in all directions utilizing iris scissors. Mustarde Flap Text: The defect edges were debeveled with a #15 scalpel blade.  Given the size, depth and location of the defect and the proximity to free margins a Mustarde flap was deemed most appropriate.  Using a sterile surgical marker, an appropriate flap was drawn incorporating the defect. The area thus outlined was incised with a #15 scalpel blade.  The skin margins were undermined to an appropriate distance in all directions utilizing iris scissors. Nasal Turnover Hinge Flap Text: The defect edges were debeveled with a #15 scalpel blade.  Given the size, depth, location of the defect and the defect being full thickness a nasal turnover hinge flap was deemed most appropriate.  Using a sterile surgical marker, an appropriate hinge flap was drawn incorporating the defect. The area thus outlined was incised with a #15 scalpel blade. The flap was designed to recreate the nasal mucosal lining and the alar rim. The skin margins were undermined to an appropriate distance in all directions utilizing iris scissors. Nasalis-Muscle-Based Myocutaneous Island Pedicle Flap Text: Using a #15 blade, an incision was made around the donor flap to the level of the nasalis muscle. Wide lateral undermining was then performed in both the subcutaneous plane above the nasalis muscle, and in a submuscular plane just above periosteum. This allowed the formation of a free nasalis muscle axial pedicle (based on the angular artery) which was still attached to the actual cutaneous flap, increasing its mobility and vascular viability. Hemostasis was obtained with pinpoint electrocoagulation. The flap was mobilized into position and the pivotal anchor points positioned and stabilized with buried interrupted sutures. Subcutaneous and dermal tissues were closed in a multilayered fashion with sutures. Tissue redundancies were excised, and the epidermal edges were apposed without significant tension and sutured with sutures. Nasalis Myocutaneous Flap Text: Using a #15 blade, an incision was made around the donor flap to the level of the nasalis muscle. Wide lateral undermining was then performed in both the subcutaneous plane above the nasalis muscle, and in a submuscular plane just above periosteum. This allowed the formation of a free nasalis muscle axial pedicle which was still attached to the actual cutaneous flap, increasing its mobility and vascular viability. Hemostasis was obtained with pinpoint electrocoagulation. The flap was mobilized into position and the pivotal anchor points positioned and stabilized with buried interrupted sutures. Subcutaneous and dermal tissues were closed in a multilayered fashion with sutures. Tissue redundancies were excised, and the epidermal edges were apposed without significant tension and sutured with sutures. Nasolabial Transposition Flap Text: The defect edges were debeveled with a #15 scalpel blade.  Given the size, depth and location of the defect and the proximity to free margins a nasolabial transposition flap was deemed most appropriate. Using a sterile surgical marker, an appropriate flap was drawn incorporating the defect. The area thus outlined was incised with a #15 scalpel blade. The skin margins were undermined to an appropriate distance in all directions utilizing iris scissors. Following this, the designed flap was carried into the primary defect and sutured into place. Orbicularis Oris Muscle Flap Text: The defect edges were debeveled with a #15 scalpel blade.  Given that the defect affected the competency of the oral sphincter an obicularis oris muscle flap was deemed most appropriate to restore this competency and normal muscle function.  Using a sterile surgical marker, an appropriate flap was drawn incorporating the defect. The area thus outlined was incised with a #15 scalpel blade. O-T Advancement Flap Text: The defect edges were debeveled with a #15 scalpel blade.  Given the location of the defect, shape of the defect and the proximity to free margins an O-T advancement flap was deemed most appropriate.  Using a sterile surgical marker, an appropriate advancement flap was drawn incorporating the defect and placing the expected incisions within the relaxed skin tension lines where possible.    The area thus outlined was incised deep to adipose tissue with a #15 scalpel blade.  The skin margins were undermined to an appropriate distance in all directions utilizing iris scissors. O-T Plasty Text: The defect edges were debeveled with a #15 scalpel blade.  Given the location of the defect, shape of the defect and the proximity to free margins an O-T plasty was deemed most appropriate.  Using a sterile surgical marker, an appropriate O-T plasty was drawn incorporating the defect and placing the expected incisions within the relaxed skin tension lines where possible.    The area thus outlined was incised deep to adipose tissue with a #15 scalpel blade.  The skin margins were undermined to an appropriate distance in all directions utilizing iris scissors. O-L Flap Text: The defect edges were debeveled with a #15 scalpel blade.  Given the location of the defect, shape of the defect and the proximity to free margins an O-L flap was deemed most appropriate.  Using a sterile surgical marker, an appropriate advancement flap was drawn incorporating the defect and placing the expected incisions within the relaxed skin tension lines where possible.    The area thus outlined was incised deep to adipose tissue with a #15 scalpel blade.  The skin margins were undermined to an appropriate distance in all directions utilizing iris scissors. O-Z Flap Text: The defect edges were debeveled with a #15 scalpel blade.  Given the location of the defect, shape of the defect and the proximity to free margins an O-Z flap was deemed most appropriate.  Using a sterile surgical marker, an appropriate transposition flap was drawn incorporating the defect and placing the expected incisions within the relaxed skin tension lines where possible. The area thus outlined was incised deep to adipose tissue with a #15 scalpel blade.  The skin margins were undermined to an appropriate distance in all directions utilizing iris scissors. O-Z Plasty Text: The defect edges were debeveled with a #15 scalpel blade.  Given the location of the defect, shape of the defect and the proximity to free margins an O-Z plasty (double transposition flap) was deemed most appropriate.  Using a sterile surgical marker, the appropriate transposition flaps were drawn incorporating the defect and placing the expected incisions within the relaxed skin tension lines where possible.    The area thus outlined was incised deep to adipose tissue with a #15 scalpel blade.  The skin margins were undermined to an appropriate distance in all directions utilizing iris scissors.  Hemostasis was achieved with electrocautery.  The flaps were then transposed into place, one clockwise and the other counterclockwise, and anchored with interrupted buried subcutaneous sutures. Peng Advancement Flap Text: The defect edges were debeveled with a #15 scalpel blade.  Given the location of the defect, shape of the defect and the proximity to free margins a Peng advancement flap was deemed most appropriate.  Using a sterile surgical marker, an appropriate advancement flap was drawn incorporating the defect and placing the expected incisions within the relaxed skin tension lines where possible. The area thus outlined was incised deep to adipose tissue with a #15 scalpel blade.  The skin margins were undermined to an appropriate distance in all directions utilizing iris scissors. Rectangular Flap Text: The defect edges were debeveled with a #15 scalpel blade. Given the location of the defect and the proximity to free margins a rectangular flap was deemed most appropriate. Using a sterile surgical marker, an appropriate rectangular flap was drawn incorporating the defect. The area thus outlined was incised deep to adipose tissue with a #15 scalpel blade. The skin margins were undermined to an appropriate distance in all directions utilizing iris scissors. Following this, the designed flap was carried over into the primary defect and sutured into place. Rhombic Flap Text: The defect edges were debeveled with a #15 scalpel blade.  Given the location of the defect and the proximity to free margins a rhombic flap was deemed most appropriate.  Using a sterile surgical marker, an appropriate rhombic flap was drawn incorporating the defect.    The area thus outlined was incised deep to adipose tissue with a #15 scalpel blade.  The skin margins were undermined to an appropriate distance in all directions utilizing iris scissors. Rhomboid Transposition Flap Text: The defect edges were debeveled with a #15 scalpel blade.  Given the location of the defect and the proximity to free margins a rhomboid transposition flap was deemed most appropriate.  Using a sterile surgical marker, an appropriate rhomboid flap was drawn incorporating the defect.    The area thus outlined was incised deep to adipose tissue with a #15 scalpel blade.  The skin margins were undermined to an appropriate distance in all directions utilizing iris scissors. Rotation Flap Text: The defect edges were debeveled with a #15 scalpel blade.  Given the location of the defect, shape of the defect and the proximity to free margins a rotation flap was deemed most appropriate.  Using a sterile surgical marker, an appropriate rotation flap was drawn incorporating the defect and placing the expected incisions within the relaxed skin tension lines where possible.    The area thus outlined was incised deep to adipose tissue with a #15 scalpel blade.  The skin margins were undermined to an appropriate distance in all directions utilizing iris scissors. Spiral Flap Text: The defect edges were debeveled with a #15 scalpel blade.  Given the location of the defect, shape of the defect and the proximity to free margins a spiral flap was deemed most appropriate.  Using a sterile surgical marker, an appropriate rotation flap was drawn incorporating the defect and placing the expected incisions within the relaxed skin tension lines where possible. The area thus outlined was incised deep to adipose tissue with a #15 scalpel blade.  The skin margins were undermined to an appropriate distance in all directions utilizing iris scissors. Staged Advancement Flap Text: The defect edges were debeveled with a #15 scalpel blade.  Given the location of the defect, shape of the defect and the proximity to free margins a staged advancement flap was deemed most appropriate.  Using a sterile surgical marker, an appropriate advancement flap was drawn incorporating the defect and placing the expected incisions within the relaxed skin tension lines where possible. The area thus outlined was incised deep to adipose tissue with a #15 scalpel blade.  The skin margins were undermined to an appropriate distance in all directions utilizing iris scissors. Star Wedge Flap Text: The defect edges were debeveled with a #15 scalpel blade.  Given the location of the defect, shape of the defect and the proximity to free margins a star wedge flap was deemed most appropriate.  Using a sterile surgical marker, an appropriate rotation flap was drawn incorporating the defect and placing the expected incisions within the relaxed skin tension lines where possible. The area thus outlined was incised deep to adipose tissue with a #15 scalpel blade.  The skin margins were undermined to an appropriate distance in all directions utilizing iris scissors. Transposition Flap Text: The defect edges were debeveled with a #15 scalpel blade.  Given the location of the defect and the proximity to free margins a transposition flap was deemed most appropriate.  Using a sterile surgical marker, an appropriate transposition flap was drawn incorporating the defect.    The area thus outlined was incised deep to adipose tissue with a #15 scalpel blade.  The skin margins were undermined to an appropriate distance in all directions utilizing iris scissors. Trilobed Flap Text: The defect edges were debeveled with a #15 scalpel blade.  Given the location of the defect and the proximity to free margins a trilobed flap was deemed most appropriate.  Using a sterile surgical marker, an appropriate trilobed flap drawn around the defect.    The area thus outlined was incised deep to adipose tissue with a #15 scalpel blade.  The skin margins were undermined to an appropriate distance in all directions utilizing iris scissors. V-Y Flap Text: The defect edges were debeveled with a #15 scalpel blade.  Given the location of the defect, shape of the defect and the proximity to free margins a V-Y flap was deemed most appropriate.  Using a sterile surgical marker, an appropriate advancement flap was drawn incorporating the defect and placing the expected incisions within the relaxed skin tension lines where possible.    The area thus outlined was incised deep to adipose tissue with a #15 scalpel blade.  The skin margins were undermined to an appropriate distance in all directions utilizing iris scissors. V-Y Plasty Text: The defect edges were debeveled with a #15 scalpel blade.  Given the location of the defect, shape of the defect and the proximity to free margins an V-Y advancement flap was deemed most appropriate.  Using a sterile surgical marker, an appropriate advancement flap was drawn incorporating the defect and placing the expected incisions within the relaxed skin tension lines where possible.    The area thus outlined was incised deep to adipose tissue with a #15 scalpel blade.  The skin margins were undermined to an appropriate distance in all directions utilizing iris scissors. W Plasty Text: The lesion was extirpated to the level of the fat with a #15 scalpel blade.  Given the location of the defect, shape of the defect and the proximity to free margins a W-plasty was deemed most appropriate for repair.  Using a sterile surgical marker, the appropriate transposition arms of the W-plasty were drawn incorporating the defect and placing the expected incisions within the relaxed skin tension lines where possible.    The area thus outlined was incised deep to adipose tissue with a #15 scalpel blade.  The skin margins were undermined to an appropriate distance in all directions utilizing iris scissors.  The opposing transposition arms were then transposed into place in opposite direction and anchored with interrupted buried subcutaneous sutures. Z Plasty Text: The lesion was extirpated to the level of the fat with a #15 scalpel blade.  Given the location of the defect, shape of the defect and the proximity to free margins a Z-plasty was deemed most appropriate for repair.  Using a sterile surgical marker, the appropriate transposition arms of the Z-plasty were drawn incorporating the defect and placing the expected incisions within the relaxed skin tension lines where possible.    The area thus outlined was incised deep to adipose tissue with a #15 scalpel blade.  The skin margins were undermined to an appropriate distance in all directions utilizing iris scissors.  The opposing transposition arms were then transposed into place in opposite direction and anchored with interrupted buried subcutaneous sutures. Zygomaticofacial Flap Text: Given the location of the defect, shape of the defect and the proximity to free margins a zygomaticofacial flap was deemed most appropriate for repair.  Using a sterile surgical marker, the appropriate flap was drawn incorporating the defect and placing the expected incisions within the relaxed skin tension lines where possible. The area thus outlined was incised deep to adipose tissue with a #15 scalpel blade with preservation of a vascular pedicle.  The skin margins were undermined to an appropriate distance in all directions utilizing iris scissors.  The flap was then placed into the defect and anchored with interrupted buried subcutaneous sutures. Abbe Flap (Lower To Upper Lip) Text: The defect of the upper lip was assessed and measured.  Given the location and size of the defect, an Abbe flap was deemed most appropriate.  Using a sterile surgical marker, an appropriate Abbe flap was measured and drawn on the lower lip. Local anesthesia was then infiltrated. A scalpel was then used to incise the upper lip through and through the skin, vermilion, muscle and mucosa, leaving the flap pedicled on the opposite side.  The flap was then rotated and transferred to the lower lip defect.  The flap was then sutured into place with a three layer technique, closing the orbicularis oris muscle layer with subcutaneous buried sutures, followed by a mucosal layer and an epidermal layer. Abbe Flap (Upper To Lower Lip) Text: The defect of the lower lip was assessed and measured.  Given the location and size of the defect, an Abbe flap was deemed most appropriate.  Using a sterile surgical marker, an appropriate Abbe flap was measured and drawn on the upper lip. Local anesthesia was then infiltrated.  A scalpel was then used to incise the upper lip through and through the skin, vermilion, muscle and mucosa, leaving the flap pedicled on the opposite side.  The flap was then rotated and transferred to the lower lip defect.  The flap was then sutured into place with a three layer technique, closing the orbicularis oris muscle layer with subcutaneous buried sutures, followed by a mucosal layer and an epidermal layer. Cheek Interpolation Flap Text: A decision was made to reconstruct the defect utilizing an interpolation axial flap and a staged reconstruction.  A telfa template was made of the defect.  This telfa template was then used to outline the Cheek Interpolation flap.  The donor area for the pedicle flap was then injected with anesthesia.  The flap was excised through the skin and subcutaneous tissue down to the layer of the underlying musculature.  The interpolation flap was carefully excised within this deep plane to maintain its blood supply.  The edges of the donor site were undermined.   The donor site was closed in a primary fashion.  The pedicle was then rotated into position and sutured.  Once the tube was sutured into place, adequate blood supply was confirmed with blanching and refill.  The pedicle was then wrapped with xeroform gauze and dressed appropriately with a telfa and gauze bandage to ensure continued blood supply and protect the attached pedicle. Cheek-To-Nose Interpolation Flap Text: A decision was made to reconstruct the defect utilizing an interpolation axial flap and a staged reconstruction.  A telfa template was made of the defect.  This telfa template was then used to outline the Cheek-To-Nose Interpolation flap.  The donor area for the pedicle flap was then injected with anesthesia.  The flap was excised through the skin and subcutaneous tissue down to the layer of the underlying musculature.  The interpolation flap was carefully excised within this deep plane to maintain its blood supply.  The edges of the donor site were undermined.   The donor site was closed in a primary fashion.  The pedicle was then rotated into position and sutured.  Once the tube was sutured into place, adequate blood supply was confirmed with blanching and refill.  The pedicle was then wrapped with xeroform gauze and dressed appropriately with a telfa and gauze bandage to ensure continued blood supply and protect the attached pedicle. Estlander Flap (Lower To Upper Lip) Text: The defect of the lower lip was assessed and measured.  Given the location and size of the defect, an Estlander flap was deemed most appropriate.  Using a sterile surgical marker, an appropriate Estlander flap was measured and drawn on the upper lip. Local anesthesia was then infiltrated. A scalpel was then used to incise the lateral aspect of the flap, through skin, muscle and mucosa, leaving the flap pedicled medially.  The flap was then rotated and positioned to fill the lower lip defect.  The flap was then sutured into place with a three layer technique, closing the orbicularis oris muscle layer with subcutaneous buried sutures, followed by a mucosal layer and an epidermal layer. Interpolation Flap Text: A decision was made to reconstruct the defect utilizing an interpolation axial flap and a staged reconstruction.  A telfa template was made of the defect.  This telfa template was then used to outline the interpolation flap.  The donor area for the pedicle flap was then injected with anesthesia.  The flap was excised through the skin and subcutaneous tissue down to the layer of the underlying musculature.  The interpolation flap was carefully excised within this deep plane to maintain its blood supply.  The edges of the donor site were undermined.   The donor site was closed in a primary fashion.  The pedicle was then rotated into position and sutured.  Once the tube was sutured into place, adequate blood supply was confirmed with blanching and refill.  The pedicle was then wrapped with xeroform gauze and dressed appropriately with a telfa and gauze bandage to ensure continued blood supply and protect the attached pedicle. Melolabial Interpolation Flap Text: A decision was made to reconstruct the defect utilizing an interpolation axial flap and a staged reconstruction.  A telfa template was made of the defect.  This telfa template was then used to outline the melolabial interpolation flap.  The donor area for the pedicle flap was then injected with anesthesia.  The flap was excised through the skin and subcutaneous tissue down to the layer of the underlying musculature.  The pedicle flap was carefully excised within this deep plane to maintain its blood supply.  The edges of the donor site were undermined.   The donor site was closed in a primary fashion.  The pedicle was then rotated into position and sutured.  Once the tube was sutured into place, adequate blood supply was confirmed with blanching and refill.  The pedicle was then wrapped with xeroform gauze and dressed appropriately with a telfa and gauze bandage to ensure continued blood supply and protect the attached pedicle. Mastoid Interpolation Flap Text: A decision was made to reconstruct the defect utilizing an interpolation axial flap and a staged reconstruction.  A telfa template was made of the defect.  This telfa template was then used to outline the mastoid interpolation flap.  The donor area for the pedicle flap was then injected with anesthesia.  The flap was excised through the skin and subcutaneous tissue down to the layer of the underlying musculature.  The pedicle flap was carefully excised within this deep plane to maintain its blood supply.  The edges of the donor site were undermined.   The donor site was closed in a primary fashion.  The pedicle was then rotated into position and sutured.  Once the tube was sutured into place, adequate blood supply was confirmed with blanching and refill.  The pedicle was then wrapped with xeroform gauze and dressed appropriately with a telfa and gauze bandage to ensure continued blood supply and protect the attached pedicle. Paramedian Forehead Flap Text: A decision was made to reconstruct the defect utilizing an interpolation axial flap and a staged reconstruction.  A telfa template was made of the defect.  This telfa template was then used to outline the paramedian forehead pedicle flap.  The donor area for the pedicle flap was then injected with anesthesia.  The flap was excised through the skin and subcutaneous tissue down to the layer of the underlying musculature.  The pedicle flap was carefully excised within this deep plane to maintain its blood supply.  The edges of the donor site were undermined.   The donor site was closed in a primary fashion.  The pedicle was then rotated into position and sutured.  Once the tube was sutured into place, adequate blood supply was confirmed with blanching and refill.  The pedicle was then wrapped with xeroform gauze and dressed appropriately with a telfa and gauze bandage to ensure continued blood supply and protect the attached pedicle. Posterior Auricular Interpolation Flap Text: A decision was made to reconstruct the defect utilizing an interpolation axial flap and a staged reconstruction.  A telfa template was made of the defect.  This telfa template was then used to outline the posterior auricular interpolation flap.  The donor area for the pedicle flap was then injected with anesthesia.  The flap was excised through the skin and subcutaneous tissue down to the layer of the underlying musculature.  The pedicle flap was carefully excised within this deep plane to maintain its blood supply.  The edges of the donor site were undermined.   The donor site was closed in a primary fashion.  The pedicle was then rotated into position and sutured.  Once the tube was sutured into place, adequate blood supply was confirmed with blanching and refill.  The pedicle was then wrapped with xeroform gauze and dressed appropriately with a telfa and gauze bandage to ensure continued blood supply and protect the attached pedicle. Cheiloplasty (Less Than 50%) Text: A decision was made to reconstruct the defect with a  cheiloplasty.  The defect was undermined extensively.  Additional obicularis oris muscle was excised with a 15 blade scalpel.  The defect was converted into a full thickness wedge, of less than 50% of the vertical height of the lip, to facilite a better cosmetic result.  Small vessels were then tied off with 5-0 monocyrl. The obicularis oris, superficial fascia, adipose and dermis were then reapproximated.  After the deeper layers were approximated the epidermis was reapproximated with particular care given to realign the vermilion border. Cheiloplasty (Complex) Text: A decision was made to reconstruct the defect with a  cheiloplasty.  The defect was undermined extensively.  Additional obicularis oris muscle was excised with a 15 blade scalpel.  The defect was converted into a full thickness wedge to facilite a better cosmetic result.  Small vessels were then tied off with 5-0 monocyrl. The obicularis oris, superficial fascia, adipose and dermis were then reapproximated.  After the deeper layers were approximated the epidermis was reapproximated with particular care given to realign the vermilion border. Ear Wedge Repair Text: A wedge excision was completed by carrying down an excision through the full thickness of the ear and cartilage with an inward facing Burow's triangle. The wound was then closed in a layered fashion. Full Thickness Lip Wedge Repair (Flap) Text: Given the location of the defect and the proximity to free margins a full thickness wedge repair was deemed most appropriate.  Using a sterile surgical marker, the appropriate repair was drawn incorporating the defect and placing the expected incisions perpendicular to the vermilion border.  The vermilion border was also meticulously outlined to ensure appropriate reapproximation during the repair.  The area thus outlined was incised through and through with a #15 scalpel blade.  The muscularis and dermis were reaproximated with deep sutures following hemostasis. Care was taken to realign the vermilion border before proceeding with the superficial closure.  Once the vermilion was realigned the superfical and mucosal closure was finished. Ftsg Text: The defect edges were debeveled with a #15 scalpel blade.  Given the location of the defect, shape of the defect and the proximity to free margins a full thickness skin graft was deemed most appropriate.  Using a sterile surgical marker, the primary defect shape was transferred to the donor site. The area thus outlined was incised deep to adipose tissue with a #15 scalpel blade.  The harvested graft was then trimmed of adipose tissue until only dermis and epidermis was left.  The skin margins of the secondary defect were undermined to an appropriate distance in all directions utilizing iris scissors.  The secondary defect was closed with interrupted buried subcutaneous sutures.  The skin edges were then re-apposed with running  sutures.  The skin graft was then placed in the primary defect and oriented appropriately. Split-Thickness Skin Graft Text: The defect edges were debeveled with a #15 scalpel blade.  Given the location of the defect, shape of the defect and the proximity to free margins a split thickness skin graft was deemed most appropriate.  Using a sterile surgical marker, the primary defect shape was transferred to the donor site. The split thickness graft was then harvested.  The skin graft was then placed in the primary defect and oriented appropriately. Pinch Graft Text: The defect edges were debeveled with a #15 scalpel blade. Given the location of the defect, shape of the defect and the proximity to free margins a pinch graft was deemed most appropriate. Using a sterile surgical marker, the primary defect shape was transferred to the donor site. The area thus outlined was incised deep to adipose tissue with a #15 scalpel blade.  The harvested graft was then trimmed of adipose tissue until only dermis and epidermis was left. The skin margins of the secondary defect were undermined to an appropriate distance in all directions utilizing iris scissors.  The secondary defect was closed with interrupted buried subcutaneous sutures.  The skin edges were then re-apposed with running  sutures.  The skin graft was then placed in the primary defect and oriented appropriately. Burow's Graft Text: The defect edges were debeveled with a #15 scalpel blade.  Given the location of the defect, shape of the defect, the proximity to free margins and the presence of a standing cone deformity a Burow's skin graft was deemed most appropriate. The standing cone was removed and this tissue was then trimmed to the shape of the primary defect. The adipose tissue was also removed until only dermis and epidermis were left.  The skin margins of the secondary defect were undermined to an appropriate distance in all directions utilizing iris scissors.  The secondary defect was closed with interrupted buried subcutaneous sutures.  The skin edges were then re-apposed with running  sutures.  The skin graft was then placed in the primary defect and oriented appropriately. Cartilage Graft Text: The defect edges were debeveled with a #15 scalpel blade.  Given the location of the defect, shape of the defect, the fact the defect involved a full thickness cartilage defect a cartilage graft was deemed most appropriate.  An appropriate donor site was identified, cleansed, and anesthetized. The cartilage graft was then harvested and transferred to the recipient site, oriented appropriately and then sutured into place.  The secondary defect was then repaired using a primary closure. Composite Graft Text: The defect edges were debeveled with a #15 scalpel blade.  Given the location of the defect, shape of the defect, the proximity to free margins and the fact the defect was full thickness a composite graft was deemed most appropriate.  The defect was outline and then transferred to the donor site.  A full thickness graft was then excised from the donor site. The graft was then placed in the primary defect, oriented appropriately and then sutured into place.  The secondary defect was then repaired using a primary closure. Epidermal Autograft Text: The defect edges were debeveled with a #15 scalpel blade.  Given the location of the defect, shape of the defect and the proximity to free margins an epidermal autograft was deemed most appropriate.  Using a sterile surgical marker, the primary defect shape was transferred to the donor site. The epidermal graft was then harvested.  The skin graft was then placed in the primary defect and oriented appropriately. Dermal Autograft Text: The defect edges were debeveled with a #15 scalpel blade.  Given the location of the defect, shape of the defect and the proximity to free margins a dermal autograft was deemed most appropriate.  Using a sterile surgical marker, the primary defect shape was transferred to the donor site. The area thus outlined was incised deep to adipose tissue with a #15 scalpel blade.  The harvested graft was then trimmed of adipose and epidermal tissue until only dermis was left.  The skin graft was then placed in the primary defect and oriented appropriately. Skin Substitute Text: The defect edges were debeveled with a #15 scalpel blade.  Given the location of the defect, shape of the defect and the proximity to free margins a skin substitute graft was deemed most appropriate.  The graft material was trimmed to fit the size of the defect. The graft was then placed in the primary defect and oriented appropriately. Skin Substitute Paste Text: The defect edges were debeveled with a #15 scalpel blade.  Given the location of the defect, shape of the defect and the proximity to free margins a skin substitute micronized graft was deemed most appropriate.  The entire vial contents were admixed with 0.5ccs of sterile saline, formed into a paste and then evenly spread over the entire wound bed. Skin Substitute Injection Text: The defect edges were debeveled with a #15 scalpel blade.  Given the location of the defect, shape of the defect and the proximity to free margins a skin substitute micronized graft was deemed most appropriate.  The entire vial contents were admixed with 3.0ccs of sterile saline and then injected subcutaneously throughout the entire wound bed. Tissue Cultured Epidermal Autograft Text: The defect edges were debeveled with a #15 scalpel blade.  Given the location of the defect, shape of the defect and the proximity to free margins a tissue cultured epidermal autograft was deemed most appropriate.  The graft was then trimmed to fit the size of the defect.  The graft was then placed in the primary defect and oriented appropriately. Xenograft Text: The defect edges were debeveled with a #15 scalpel blade.  Given the location of the defect, shape of the defect and the proximity to free margins a xenograft was deemed most appropriate.  The graft was then trimmed to fit the size of the defect.  The graft was then placed in the primary defect and oriented appropriately. Purse String (Simple) Text: Given the location of the defect and the characteristics of the surrounding skin a purse string closure was deemed most appropriate.  Undermining was performed circumfirentially around the surgical defect.  A purse string suture was then placed and tightened. Purse String (Intermediate) Text: Given the location of the defect and the characteristics of the surrounding skin a purse string intermediate closure was deemed most appropriate.  Undermining was performed circumfirentially around the surgical defect.  A purse string suture was then placed and tightened. Partial Purse String (Simple) Text: Given the location of the defect and the characteristics of the surrounding skin a simple purse string closure was deemed most appropriate.  Undermining was performed circumfirentially around the surgical defect.  A purse string suture was then placed and tightened. Wound tension only allowed a partial closure of the circular defect. Partial Purse String (Intermediate) Text: Given the location of the defect and the characteristics of the surrounding skin an intermediate purse string closure was deemed most appropriate.  Undermining was performed circumfirentially around the surgical defect.  A purse string suture was then placed and tightened. Wound tension only allowed a partial closure of the circular defect. Localized Dermabrasion With Wire Brush Text: The patient was draped in routine manner.  Localized dermabrasion using 3 x 17 mm wire brush was performed in routine manner to papillary dermis. This spot dermabrasion is being performed to complete skin cancer reconstruction. It also will eliminate the other sun damaged precancerous cells that are known to be part of the regional effect of a lifetime's worth of sun exposure. This localized dermabrasion is therapeutic and should not be considered cosmetic in any regard. Localized Dermabrasion With Sand Papertext: The patient was draped in routine manner.  Localized dermabrasion using sterile sand paper was performed in routine manner to papillary dermis. This spot dermabrasion is being performed to complete skin cancer reconstruction. It also will eliminate the other sun damaged precancerous cells that are known to be part of the regional effect of a lifetime's worth of sun exposure. This localized dermabrasion is therapeutic and should not be considered cosmetic in any regard. Localized Dermabrasion With 15 Blade Text: The patient was draped in routine manner.  Localized dermabrasion using a 15 blade was performed in routine manner to papillary dermis. This spot dermabrasion is being performed to complete skin cancer reconstruction. It also will eliminate the other sun damaged precancerous cells that are known to be part of the regional effect of a lifetime's worth of sun exposure. This localized dermabrasion is therapeutic and should not be considered cosmetic in any regard. Tarsorrhaphy Text: A tarsorrhaphy was performed using Frost sutures. Intermediate Repair And Flap Additional Text (Will Appearing After The Standard Complex Repair Text): The intermediate repair was not sufficient to completely close the primary defect. The remaining additional defect was repaired with the flap mentioned below. Intermediate Repair And Graft Additional Text (Will Appearing After The Standard Complex Repair Text): The intermediate repair was not sufficient to completely close the primary defect. The remaining additional defect was repaired with the graft mentioned below. Complex Repair And Flap Additional Text (Will Appearing After The Standard Complex Repair Text): The complex repair was not sufficient to completely close the primary defect. The remaining additional defect was repaired with the flap mentioned below. Complex Repair And Graft Additional Text (Will Appearing After The Standard Complex Repair Text): The complex repair was not sufficient to completely close the primary defect. The remaining additional defect was repaired with the graft mentioned below. Eyelid Full Thickness Repair - 42770: The eyelid defect was full thickness which required a wedge repair of the eyelid. Special care was taken to ensure that the eyelid margin was realligned when placing sutures. Eyelid Partial Thickness Repair - 79304: The eyelid defect was partial thickness which required a wedge repair of the eyelid. Special care was taken to ensure that the eyelid margin was realligned when placing sutures. Cheek Interpolation Flap Division And Inset Text: Division and inset of the cheek interpolation flap was performed to achieve optimal aesthetic result, restore normal anatomic appearance and avoid distortion of normal anatomy, expedite and facilitate wound healing, achieve optimal functional result and because linear closure either not possible or would produce suboptimal result. The patient was prepped and draped in the usual manner. The pedicle was infiltrated with local anesthesia. The pedicle was sectioned with a #15 blade. The pedicle was de-bulked and trimmed to match the shape of the defect. Hemostasis was achieved. The flap donor site and free margin of the flap were secured with deep buried sutures and the wound edges were re-approximated. Cheek To Nose Interpolation Flap Division And Inset Text: Division and inset of the cheek to nose interpolation flap was performed to achieve optimal aesthetic result, restore normal anatomic appearance and avoid distortion of normal anatomy, expedite and facilitate wound healing, achieve optimal functional result and because linear closure either not possible or would produce suboptimal result. The patient was prepped and draped in the usual manner. The pedicle was infiltrated with local anesthesia. The pedicle was sectioned with a #15 blade. The pedicle was de-bulked and trimmed to match the shape of the defect. Hemostasis was achieved. The flap donor site and free margin of the flap were secured with deep buried sutures and the wound edges were re-approximated. Melolabial Interpolation Flap Division And Inset Text: Division and inset of the melolabial interpolation flap was performed to achieve optimal aesthetic result, restore normal anatomic appearance and avoid distortion of normal anatomy, expedite and facilitate wound healing, achieve optimal functional result and because linear closure either not possible or would produce suboptimal result. The patient was prepped and draped in the usual manner. The pedicle was infiltrated with local anesthesia. The pedicle was sectioned with a #15 blade. The pedicle was de-bulked and trimmed to match the shape of the defect. Hemostasis was achieved. The flap donor site and free margin of the flap were secured with deep buried sutures and the wound edges were re-approximated. Mastoid Interpolation Flap Division And Inset Text: Division and inset of the mastoid interpolation flap was performed to achieve optimal aesthetic result, restore normal anatomic appearance and avoid distortion of normal anatomy, expedite and facilitate wound healing, achieve optimal functional result and because linear closure either not possible or would produce suboptimal result. The patient was prepped and draped in the usual manner. The pedicle was infiltrated with local anesthesia. The pedicle was sectioned with a #15 blade. The pedicle was de-bulked and trimmed to match the shape of the defect. Hemostasis was achieved. The flap donor site and free margin of the flap were secured with deep buried sutures and the wound edges were re-approximated. Paramedian Forehead Flap Division And Inset Text: Division and inset of the paramedian forehead flap was performed to achieve optimal aesthetic result, restore normal anatomic appearance and avoid distortion of normal anatomy, expedite and facilitate wound healing, achieve optimal functional result and because linear closure either not possible or would produce suboptimal result. The patient was prepped and draped in the usual manner. The pedicle was infiltrated with local anesthesia. The pedicle was sectioned with a #15 blade. The pedicle was de-bulked and trimmed to match the shape of the defect. Hemostasis was achieved. The flap donor site and free margin of the flap were secured with deep buried sutures and the wound edges were re-approximated. Posterior Auricular Interpolation Flap Division And Inset Text: Division and inset of the posterior auricular interpolation flap was performed to achieve optimal aesthetic result, restore normal anatomic appearance and avoid distortion of normal anatomy, expedite and facilitate wound healing, achieve optimal functional result and because linear closure either not possible or would produce suboptimal result. The patient was prepped and draped in the usual manner. The pedicle was infiltrated with local anesthesia. The pedicle was sectioned with a #15 blade. The pedicle was de-bulked and trimmed to match the shape of the defect. Hemostasis was achieved. The flap donor site and free margin of the flap were secured with deep buried sutures and the wound edges were re-approximated. Manual Repair Warning Statement: We plan on removing the manually selected variable below in favor of our much easier automatic structured text blocks found in the previous tab. We decided to do this to help make the flow better and give you the full power of structured data. Manual selection is never going to be ideal in our platform and I would encourage you to avoid using manual selection from this point on, especially since I will be sunsetting this feature. It is important that you do one of two things with the customized text below. First, you can save all of the text in a word file so you can have it for future reference. Second, transfer the text to the appropriate area in the Library tab. Lastly, if there is a flap or graft type which we do not have you need to let us know right away so I can add it in before the variable is hidden. No need to panic, we plan to give you roughly 6 months to make the change. Consent: The rationale for the repair was explained to the patient and consent was obtained. The risks, benefits and alternatives to therapy were discussed in detail. Specifically, the risks of infection, scarring, bleeding, prolonged wound healing, incomplete removal, allergy to anesthesia, nerve injury and recurrence were addressed. Prior to the procedure, the treatment site was clearly identified and confirmed by the patient. All components of Universal Protocol/PAUSE Rule completed. Post-Care Instructions: Patient was given written post-op instructions Pain Refusal Text: I offered to prescribe pain medication but the patient refused to take this medication. Where Do You Want The Question To Include Opioid Counseling Located?: Case Summary Tab Information: Selecting Yes will display possible errors in your note based on the variables you have selected. This validation is only offered as a suggestion for you. PLEASE NOTE THAT THE VALIDATION TEXT WILL BE REMOVED WHEN YOU FINALIZE YOUR NOTE. IF YOU WANT TO FAX A PRELIMINARY NOTE YOU WILL NEED TO TOGGLE THIS TO 'NO' IF YOU DO NOT WANT IT IN YOUR FAXED NOTE.
